# Patient Record
Sex: FEMALE | Race: WHITE | Employment: FULL TIME | URBAN - METROPOLITAN AREA
[De-identification: names, ages, dates, MRNs, and addresses within clinical notes are randomized per-mention and may not be internally consistent; named-entity substitution may affect disease eponyms.]

---

## 2017-09-18 ENCOUNTER — OFFICE VISIT (OUTPATIENT)
Dept: OBGYN | Age: 47
End: 2017-09-18
Payer: COMMERCIAL

## 2017-09-18 VITALS
DIASTOLIC BLOOD PRESSURE: 76 MMHG | SYSTOLIC BLOOD PRESSURE: 126 MMHG | HEIGHT: 60 IN | WEIGHT: 251 LBS | BODY MASS INDEX: 49.28 KG/M2

## 2017-09-18 DIAGNOSIS — N63.15 BREAST LUMP ON RIGHT SIDE AT 3 O'CLOCK POSITION: Primary | ICD-10-CM

## 2017-09-18 PROCEDURE — 99213 OFFICE O/P EST LOW 20 MIN: CPT | Performed by: OBSTETRICS & GYNECOLOGY

## 2017-11-28 ENCOUNTER — HOSPITAL ENCOUNTER (OUTPATIENT)
Dept: ULTRASOUND IMAGING | Age: 47
Discharge: HOME | End: 2017-11-28
Payer: COMMERCIAL

## 2017-11-28 ENCOUNTER — HOSPITAL ENCOUNTER (OUTPATIENT)
Dept: WOMENS IMAGING | Age: 47
Discharge: HOME | End: 2017-11-28
Payer: COMMERCIAL

## 2017-11-28 DIAGNOSIS — N63.15 BREAST LUMP ON RIGHT SIDE AT 3 O'CLOCK POSITION: ICD-10-CM

## 2017-11-28 PROCEDURE — 76641 ULTRASOUND BREAST COMPLETE: CPT

## 2017-11-28 PROCEDURE — G0204 DX MAMMO INCL CAD BI: HCPCS

## 2019-08-06 ENCOUNTER — HOSPITAL ENCOUNTER (OUTPATIENT)
Age: 49
Discharge: HOME | End: 2019-08-06
Payer: COMMERCIAL

## 2019-08-06 DIAGNOSIS — Z00.00 WELLNESS EXAMINATION: ICD-10-CM

## 2019-08-06 LAB
ALBUMIN SERPL-MCNC: 4.4 G/DL (ref 3.5–5.2)
ALBUMIN/GLOBULIN RATIO: 1.3 (ref 1–2.5)
ALP BLD-CCNC: 93 U/L (ref 35–104)
ALT SERPL-CCNC: 22 U/L (ref 5–33)
ANION GAP SERPL CALCULATED.3IONS-SCNC: 13 MMOL/L (ref 9–17)
AST SERPL-CCNC: 19 U/L
BILIRUB SERPL-MCNC: 0.47 MG/DL (ref 0.3–1.2)
BUN BLDV-MCNC: 10 MG/DL (ref 6–20)
BUN/CREAT BLD: 18 (ref 9–20)
CALCIUM SERPL-MCNC: 10.2 MG/DL (ref 8.6–10.4)
CHLORIDE BLD-SCNC: 103 MMOL/L (ref 98–107)
CHOLESTEROL, FASTING: 172 MG/DL
CHOLESTEROL/HDL RATIO: 3
CO2: 24 MMOL/L (ref 20–31)
CREAT SERPL-MCNC: 0.55 MG/DL (ref 0.5–0.9)
GFR AFRICAN AMERICAN: >60 ML/MIN
GFR NON-AFRICAN AMERICAN: >60 ML/MIN
GFR SERPL CREATININE-BSD FRML MDRD: ABNORMAL ML/MIN/{1.73_M2}
GFR SERPL CREATININE-BSD FRML MDRD: ABNORMAL ML/MIN/{1.73_M2}
GLUCOSE FASTING: 107 MG/DL (ref 70–99)
HDLC SERPL-MCNC: 57 MG/DL
LDL CHOLESTEROL: 87 MG/DL (ref 0–130)
POTASSIUM SERPL-SCNC: 4.5 MMOL/L (ref 3.7–5.3)
SODIUM BLD-SCNC: 140 MMOL/L (ref 135–144)
TOTAL PROTEIN: 7.9 G/DL (ref 6.4–8.3)
TRIGLYCERIDE, FASTING: 142 MG/DL
VLDLC SERPL CALC-MCNC: NORMAL MG/DL (ref 1–30)

## 2019-08-06 PROCEDURE — 80053 COMPREHEN METABOLIC PANEL: CPT

## 2019-08-06 PROCEDURE — 36415 COLL VENOUS BLD VENIPUNCTURE: CPT

## 2019-08-06 PROCEDURE — 80061 LIPID PANEL: CPT

## 2020-12-03 ENCOUNTER — HOSPITAL ENCOUNTER (EMERGENCY)
Age: 50
Discharge: HOME | End: 2020-12-03
Attending: EMERGENCY MEDICINE
Payer: COMMERCIAL

## 2020-12-03 VITALS
SYSTOLIC BLOOD PRESSURE: 215 MMHG | TEMPERATURE: 96.9 F | OXYGEN SATURATION: 100 % | DIASTOLIC BLOOD PRESSURE: 116 MMHG | HEART RATE: 72 BPM | RESPIRATION RATE: 16 BRPM

## 2020-12-03 PROCEDURE — 99283 EMERGENCY DEPT VISIT LOW MDM: CPT

## 2020-12-03 NOTE — ED PROVIDER NOTES
[facility] ED  EMERGENCY DEPARTMENT ENCOUNTER      Pt Name: Jaz Paredes  MRN: [id number]  [place] 1970  Date of evaluation: 12/3/2020  Provider: Jaya Fleming MD    CHIEF COMPLAINT       Chief Complaint   Patient presents with    Fall     Onset yesterday AM in parking lot, hitting back of head on a vehicle. Pt reports feeling dazed and having nosebleed after fall         HISTORY OF PRESENT ILLNESS   (Location/Symptom, Timing/Onset, Context/Setting, Quality, Duration, Modifying Factors, Severity)  Note limiting factors. Jaz Paredes is a 53 y.o. female who presents to the emergency department      67-year-old female presented emergency department for evaluation of an injury that occurred yesterday 12/2/2020. States she was in the parking lot at work she slipped on some ice and fell backwards striking her head on a car. She did have brief struck the visions but denies any loss of consciousness. She is able to stand and walk after the incident. She was concerned because she did notice amount of blood right nostril. Did not strike her face. She has not noticed any bleeding from her right nostril since. She does not have any pain in her upper neck, back of her head or anywhere else. She has not had any fluid leaking from her nose. Denies visual disturbances. No nausea or vomiting. She did take some Motrin yesterday for mild discomfort in the back of her head but she has not needed anything today and is currently denying any acute concerns. Nursing Notes were reviewed. REVIEW OF SYSTEMS    (2-9 systems for level 4, 10 or more for level 5)     Review of Systems   All other systems reviewed and are negative. Except as noted above the remainder of the review of systems was reviewed and negative.        PAST MEDICAL HISTORY     Past Medical History:   Diagnosis Date    Hypertension          SURGICAL HISTORY       Past Surgical History:   Procedure Laterality Date  CHOLECYSTECTOMY      CYSTOSCOPY      DILATION AND CURETTAGE OF UTERUS      Hysteroscopy and Endometrial Ablation    ENDOMETRIAL ABLATION      HERNIA REPAIR      umbilical 9400         CURRENT MEDICATIONS       Current Discharge Medication List      CONTINUE these medications which have NOT CHANGED    Details   simvastatin (ZOCOR) 40 MG tablet Take 1 tablet by mouth every evening  Qty: 90 tablet, Refills: 3      verapamil (CALAN SR) 180 MG extended release tablet Take 1 tablet by mouth daily  Qty: 90 tablet, Refills: 3      enalapril (VASOTEC) 10 MG tablet Take 1 tablet by mouth 2 times daily  Qty: 180 tablet, Refills: 3             ALLERGIES     Patient has no known allergies.     FAMILY HISTORY       Family History   Problem Relation Age of Onset    COPD Mother     Heart Disease Father     Pacemaker Sister     High Blood Pressure Sister     Heart Disease Brother           SOCIAL HISTORY       Social History     Socioeconomic History    Marital status:      Spouse name: None    Number of children: None    Years of education: None    Highest education level: None   Occupational History    None   Social Needs    Financial resource strain: Not hard at all   [place] insecurity     Worry: Never true     Inability: Never true    Transportation needs     Medical: No     Non-medical: No   Tobacco Use    Smoking status: Never Smoker    Smokeless tobacco: Never Used   Substance and Sexual Activity    Alcohol use: No     Comment: rare    Drug use: No    Sexual activity: Yes     Partners: Male   Lifestyle    Physical activity     Days per week: None     Minutes per session: None    Stress: None   Relationships    Social connections     Talks on phone: None     Gets together: None     Attends Worship service: None     Active member of club or organization: None     Attends meetings of clubs or organizations: None     Relationship status: None    Intimate partner violence     Fear of current or ex partner: None     Emotionally abused: None     Physically abused: None     Forced sexual activity: None   Other Topics Concern    None   Social History Narrative    None       SCREENINGS        Sari Coma Scale  Eye Opening: Spontaneous  Best Verbal Response: Oriented  Best Motor Response: Obeys commands  Sari Coma Scale Score: 15               PHYSICAL EXAM    (up to 7 for level 4, 8 or more for level 5)     ED Triage Vitals   BP Temp Temp Source Pulse Resp SpO2 Height Weight   12/03/20 0810 12/03/20 0806 12/03/20 0806 12/03/20 0806 12/03/20 0806 12/03/20 0806 -- --   (!) 215/116 96.9 °F (36.1 °C) Temporal 72 16 100 %         Physical Exam  Vitals signs and nursing note reviewed. Constitutional:       General: She is not in acute distress. Appearance: She is not toxic-appearing. HENT:      Head: Normocephalic and atraumatic. Comments: She has no tenderness to palpation of her scalp. There is no soft tissue swelling. No periorbital ecchymosis. No posterior auricular bruising. No tenderness. Right Ear: Tympanic membrane normal.      Left Ear: Tympanic membrane normal.      Nose:      Comments: Nose is nontender. There is no ecchymosis. No edema abrasions or deformity. Some friable mucosa on the right nostril. there is no active bleeding. No other acute concern     Mouth/Throat:      Mouth: Mucous membranes are moist.   Eyes:      Extraocular Movements: Extraocular movements intact. Pupils: Pupils are equal, round, and reactive to light. Neck:      Musculoskeletal: Normal range of motion and neck supple. Comments: No midline tenderness to palpation. Patient has full range of motion. Cardiovascular:      Rate and Rhythm: Normal rate and regular rhythm. Pulmonary:      Effort: Pulmonary effort is normal.      Breath sounds: Normal breath sounds. Abdominal:      General: There is no distension. Palpations: Abdomen is soft. Tenderness:  There is no abdominal tenderness. Musculoskeletal: Normal range of motion. Skin:     General: Skin is warm and dry. Neurological:      General: No focal deficit present. Mental Status: She is alert and oriented to person, place, and time. Psychiatric:         Mood and Affect: Mood normal.         DIAGNOSTIC RESULTS     EKG: All EKG's are interpreted by the Emergency Department Physician who either signs or Co-signs this chart in the absence of a cardiologist.        RADIOLOGY:   Non-plain film images such as CT, Ultrasound and MRI are read by the radiologist. Plain radiographic images are visualized and preliminarily interpreted by the emergency physician with the below findings:        Interpretation per the Radiologist below, if available at the time of this note:    No orders to display         ED BEDSIDE ULTRASOUND:   Performed by ED Physician - none    LABS:  Labs Reviewed - No data to display    All other labs were within normal range or not returned as of this dictation. EMERGENCY DEPARTMENT COURSE and DIFFERENTIAL DIAGNOSIS/MDM:   Vitals:    Vitals:    12/03/20 0806 12/03/20 0810   BP:  (!) 215/116   Pulse: 72    Resp: 16    Temp: 96.9 °F (36.1 °C)    TempSrc: Temporal    SpO2: 100%            MDM  Number of Diagnoses or Management Options  Minor closed head injury:   Diagnosis management comments: Sickle examination unremarkable. Patient is currently without any symptoms. She did not lose consciousness with the incident. She had no periorbital or posterior auricular bruising. No drainage from her nose. No neck pain. Assurance was given. Patient was discharged home. REASSESSMENT        CRITICAL CARE TIME   Total Critical Care time was  minutes, excluding separately reportable procedures. There was a high probability of clinically significant/life threatening deterioration in the patient's condition which required my urgent intervention.       CONSULTS:  None    PROCEDURES:  Unless otherwise noted below, none     Procedures        FINAL IMPRESSION      1. Minor closed head injury          DISPOSITION/PLAN   DISPOSITION Decision To Discharge 12/03/2020 08:37:53 AM      PATIENT REFERRED TO:  Yovani Hodgson MD  [place] [address], [id number] [place] [address]  [phone]      As needed      DISCHARGE MEDICATIONS:  Current Discharge Medication List        Controlled Substances Monitoring:     No flowsheet data found.     (Please note that portions of this note were completed with a voice recognition program.  Efforts were made to edit the dictations but occasionally words are mis-transcribed.)    Carley Garcia MD (electronically signed)  Attending Emergency Physician             Carley Garcia MD  12/03/20 [id number]

## 2021-07-16 ENCOUNTER — HOSPITAL ENCOUNTER (OUTPATIENT)
Age: 51
Discharge: HOME | End: 2021-07-16
Payer: COMMERCIAL

## 2021-07-16 DIAGNOSIS — Z00.00 WELLNESS EXAMINATION: ICD-10-CM

## 2021-07-16 LAB
ALBUMIN SERPL-MCNC: 4.5 G/DL (ref 3.5–5.2)
ALBUMIN/GLOBULIN RATIO: 1.5 (ref 1–2.5)
ALP BLD-CCNC: 122 U/L (ref 35–104)
ALT SERPL-CCNC: 27 U/L (ref 5–33)
ANION GAP SERPL CALCULATED.3IONS-SCNC: 12 MMOL/L (ref 9–17)
AST SERPL-CCNC: 22 U/L
BILIRUB SERPL-MCNC: 0.67 MG/DL (ref 0.3–1.2)
BUN BLDV-MCNC: 14 MG/DL (ref 6–20)
BUN/CREAT BLD: 26 (ref 9–20)
CALCIUM SERPL-MCNC: 10 MG/DL (ref 8.6–10.4)
CHLORIDE BLD-SCNC: 103 MMOL/L (ref 98–107)
CHOLESTEROL, FASTING: 174 MG/DL
CHOLESTEROL/HDL RATIO: 3
CO2: 24 MMOL/L (ref 20–31)
CREAT SERPL-MCNC: 0.54 MG/DL (ref 0.5–0.9)
GFR AFRICAN AMERICAN: >60 ML/MIN
GFR NON-AFRICAN AMERICAN: >60 ML/MIN
GFR SERPL CREATININE-BSD FRML MDRD: ABNORMAL ML/MIN/{1.73_M2}
GFR SERPL CREATININE-BSD FRML MDRD: ABNORMAL ML/MIN/{1.73_M2}
GLUCOSE FASTING: 105 MG/DL (ref 70–99)
HDLC SERPL-MCNC: 58 MG/DL
LDL CHOLESTEROL: 87 MG/DL (ref 0–130)
POTASSIUM SERPL-SCNC: 4.4 MMOL/L (ref 3.7–5.3)
SODIUM BLD-SCNC: 139 MMOL/L (ref 135–144)
TOTAL PROTEIN: 7.6 G/DL (ref 6.4–8.3)
TRIGLYCERIDE, FASTING: 144 MG/DL
VLDLC SERPL CALC-MCNC: NORMAL MG/DL (ref 1–30)

## 2021-07-16 PROCEDURE — 80061 LIPID PANEL: CPT

## 2021-07-16 PROCEDURE — 36415 COLL VENOUS BLD VENIPUNCTURE: CPT

## 2021-07-16 PROCEDURE — 80053 COMPREHEN METABOLIC PANEL: CPT

## 2021-10-01 ENCOUNTER — OFFICE VISIT (OUTPATIENT)
Dept: OBGYN | Age: 51
End: 2021-10-01
Payer: COMMERCIAL

## 2021-10-01 ENCOUNTER — HOSPITAL ENCOUNTER (OUTPATIENT)
Age: 51
Setting detail: SPECIMEN
Discharge: HOME | End: 2021-10-01
Payer: COMMERCIAL

## 2021-10-01 VITALS
WEIGHT: 249 LBS | SYSTOLIC BLOOD PRESSURE: 124 MMHG | DIASTOLIC BLOOD PRESSURE: 76 MMHG | HEIGHT: 60 IN | BODY MASS INDEX: 48.88 KG/M2

## 2021-10-01 DIAGNOSIS — Z01.419 WOMEN'S ANNUAL ROUTINE GYNECOLOGICAL EXAMINATION: ICD-10-CM

## 2021-10-01 DIAGNOSIS — Z12.31 ENCOUNTER FOR SCREENING MAMMOGRAM FOR BREAST CANCER: ICD-10-CM

## 2021-10-01 DIAGNOSIS — Z01.419 WOMEN'S ANNUAL ROUTINE GYNECOLOGICAL EXAMINATION: Primary | ICD-10-CM

## 2021-10-01 PROCEDURE — 99386 PREV VISIT NEW AGE 40-64: CPT | Performed by: OBSTETRICS & GYNECOLOGY

## 2021-10-01 PROCEDURE — G0145 SCR C/V CYTO,THINLAYER,RESCR: HCPCS

## 2021-10-01 NOTE — PROGRESS NOTES
YEARLY PHYSICAL    Date of service: 10/1/2021    Roseline Ricardo  Is a 49 y.o.   female    PT's PCP is: Chalo Martin MD     : 1970                                             Subjective:       No LMP recorded. Patient has had an ablation. Are your menses regular: not applicable    OB History    Para Term  AB Living   1 1 1         SAB TAB Ectopic Molar Multiple Live Births                    # Outcome Date GA Lbr Bucky/2nd Weight Sex Delivery Anes PTL Lv   1 Term  40w0d  5 lb 5 oz (2.41 kg) M Vag-Spont           Social History     Tobacco Use   Smoking Status Never Smoker   Smokeless Tobacco Never Used        Social History     Substance and Sexual Activity   Alcohol Use No    Comment: rare       Family History   Problem Relation Age of Onset    COPD Mother     Heart Disease Father     Pacemaker Sister     High Blood Pressure Sister     Heart Disease Brother        Allergies: Patient has no known allergies.       Current Outpatient Medications:     enalapril (VASOTEC) 10 MG tablet, Take 1 tablet by mouth daily, Disp: 90 tablet, Rfl: 3    hydroCHLOROthiazide (HYDRODIURIL) 25 MG tablet, Take 1 tablet by mouth every morning, Disp: 90 tablet, Rfl: 3    simvastatin (ZOCOR) 40 MG tablet, Take 1 tablet by mouth every evening, Disp: 90 tablet, Rfl: 3    verapamil (VERELAN) 360 MG extended release capsule, Take 1 capsule by mouth daily, Disp: 90 capsule, Rfl: 3    Social History     Substance and Sexual Activity   Sexual Activity Yes    Partners: Male       Any bleeding or pain with intercourse: Yes    Last Yearly:  7/6/15    Last pap: 7/6/15    Last HPV: never    Last Mammogram: 18    Last Dexascan never    Do you do self breast exams: No    Past Medical History:   Diagnosis Date    Hypertension        Past Surgical History:   Procedure Laterality Date    CHOLECYSTECTOMY      CYSTOSCOPY      DILATION AND CURETTAGE OF UTERUS      Hysteroscopy and Endometrial Ablation    ENDOMETRIAL ABLATION      UMBILICAL HERNIA REPAIR  2003       Family History   Problem Relation Age of Onset   Son COPD Mother     Heart Disease Father     Pacemaker Sister     High Blood Pressure Sister     Heart Disease Brother        Any family history of breast or ovarian cancer: No    Any family history of blood clots: Yes-mother      Chief Complaint   Patient presents with    Gynecologic Exam     Pt presents today for yearly exam. Pt states she thinks shes going through menopause- having a lot of hotflashes. Nurse: Ashley Mena    PE:  Vital Signs  Blood pressure 124/76, height 5' (1.524 m), weight 249 lb (112.9 kg). Labs:    No results found for this visit on 10/01/21. HPI: The patient is here today for a yearly exam.  We did discuss her concerns regarding menopause    NoPT denies fever, chills, nausea and vomiting       Objective  Lymphatic:   no lymphadenopathy  Heent:   negative   Cor: regular rate and rhythm, no murmurs              Pul:clear to auscultation bilaterally- no wheezes, rales or rhonchi, normal air movement, no respiratory distress      GI: Abdomen soft, non-tender.  BS normal. No masses,  No organomegaly, [facility] noted           Extremities: normal strength, tone, and muscle mass   Breasts: Breast:normal appearance, no masses or tenderness, Inspection negative, No nipple retraction or dimpling, No nipple discharge or bleeding, No axillary or supraclavicular adenopathy, Normal to palpation without dominant masses   Pelvic Exam: GENITAL/URINARY:  External Genitalia:  General appearance; normal, Hair distribution; normal, Lesions absent  Vagina:  General appearance normal, Estrogen effect normal, Discharge absent, Lesions absent, Pelvic support normal  Cervix:  General appearance normal, Lesions absent, Discharge absent, Tenderness absent, Enlargement absent, Nodularity absent  Uterus:  Size normal, Contour normal, Position normal, Masses absent, Consistency; normal, Support normal, Tenderness absent  Adenexa: Masses absent, Tenderness absent, Enlargement absent, Nodularity absent                                      Vaginal discharge: no vaginal discharge      Uterus: normal size, anteverted, mobile, non-tender, normal shape and consistency     Ovaries: Nonpalpable           Over 50% of time spent on counseling and care coordination on: see assessment and plan                        Assessment and Plan: After discussion on menopause the patient will start with herbal supplementation such as black cohosh. Diagnosis Orders   1. Women's annual routine gynecological examination  PAP SMEAR   2. Encounter for screening mammogram for breast cancer  TOM LASHELL DIGITAL SCREEN BILATERAL       I am having Jonathon Hammonds maintain her enalapril, hydroCHLOROthiazide, simvastatin, and verapamil.

## 2021-10-07 LAB — CYTOLOGY REPORT: NORMAL

## 2021-10-11 PROBLEM — K64.4 EXTERNAL HEMORRHOID: Status: ACTIVE | Noted: 2021-10-11

## 2021-10-15 ENCOUNTER — TELEPHONE (OUTPATIENT)
Dept: SURGERY | Age: 51
End: 2021-10-15

## 2021-10-15 NOTE — TELEPHONE ENCOUNTER
Can Christopher Blackwood be a direct set up for her colonoscopy? She also has some hemorrhoids. She wasn't sure if she could just do her colonoscopy or she needed to be seen in the office first. Please advise.

## 2021-10-26 ENCOUNTER — OFFICE VISIT (OUTPATIENT)
Dept: SURGERY | Age: 51
End: 2021-10-26
Payer: COMMERCIAL

## 2021-10-26 DIAGNOSIS — Z87.19 S/P VENTRAL HERNIORRHAPHY: ICD-10-CM

## 2021-10-26 DIAGNOSIS — K59.00 CONSTIPATION, UNSPECIFIED CONSTIPATION TYPE: ICD-10-CM

## 2021-10-26 DIAGNOSIS — K64.4 INTERNAL AND EXTERNAL HEMORRHOIDS WITHOUT COMPLICATION: ICD-10-CM

## 2021-10-26 DIAGNOSIS — K64.8 INTERNAL AND EXTERNAL HEMORRHOIDS WITHOUT COMPLICATION: ICD-10-CM

## 2021-10-26 DIAGNOSIS — Z90.49 S/P LAPAROSCOPIC CHOLECYSTECTOMY: ICD-10-CM

## 2021-10-26 DIAGNOSIS — Z98.890 S/P VENTRAL HERNIORRHAPHY: ICD-10-CM

## 2021-10-26 DIAGNOSIS — K62.89 ANAL PAIN: Primary | ICD-10-CM

## 2021-10-26 PROCEDURE — 99203 OFFICE O/P NEW LOW 30 MIN: CPT | Performed by: SURGERY

## 2021-10-26 RX ORDER — LIDOCAINE HYDROCHLORIDE AND HYDROCORTISONE ACETATE 30; 5 MG/G; MG/G
1 CREAM RECTAL 2 TIMES DAILY
Qty: 28.3 G | Refills: 1 | Status: SHIPPED | OUTPATIENT
Start: 2021-10-26 | End: 2022-02-12

## 2021-10-26 RX ORDER — SODIUM, POTASSIUM,MAG SULFATES 17.5-3.13G
1 SOLUTION, RECONSTITUTED, ORAL ORAL ONCE
Qty: 1 EACH | Refills: 0 | Status: SHIPPED | OUTPATIENT
Start: 2021-10-26 | End: 2021-10-26

## 2021-10-26 NOTE — PROGRESS NOTES
Ed Murrieta MD  General Surgery, Endoscopy  Chief Medical Officer    [facility]  [address] [address]  Dept: [phone]  Fax: [phone]    CHIEF COMPLAINT  Chief Complaint   Patient presents with    New Patient     hemorrhoids, colonoscopy consult       HPI    Ms Genoveva West is a pleasant 69-year-old female kindly referred to me by Sheri Dietz CNP for evaluation of rectal bleeding, hemorrhoids. She occasionally notes blood on toilet tissue. No previous hemorrhoidal surgery. Bowel movements are typically formed, brown, occasional constipation. No previous endoscopy. History of cholecystectomy and umbilical herniorrhaphy. No recent weight changes, 250 pounds, BMI 48. No cough, fever nor other respiratory symptoms. No family history of colon cancer nor polyps to her knowledge. Patient has never smoked. Review of Systems   Constitutional: Negative for activity change, appetite change, chills, fever and unexpected weight change. HENT: Negative for nosebleeds, sneezing, sore throat and trouble swallowing. Eyes: Negative for visual disturbance. Respiratory: Negative for cough, choking and shortness of breath. Cardiovascular: Negative for chest pain, palpitations and leg swelling. Gastrointestinal: Positive for anal bleeding and blood in stool. Negative for abdominal pain, nausea and vomiting. Genitourinary: Negative for dysuria, flank pain and hematuria. Musculoskeletal: Negative for back pain, gait problem and myalgias. Allergic/Immunologic: Negative for immunocompromised state. Neurological: Negative for dizziness, seizures, syncope, weakness and headaches. Hematological: Does not bruise/bleed easily. Psychiatric/Behavioral: Negative for confusion and sleep disturbance.        Past Medical History:   Diagnosis Date    Hypertension        Past Surgical History:   Procedure Laterality Date    CHOLECYSTECTOMY      CYSTOSCOPY      DILATION AND CURETTAGE OF UTERUS      Hysteroscopy and Endometrial Ablation    ENDOMETRIAL ABLATION      UMBILICAL HERNIA REPAIR  2003       Family History   Problem Relation Age of Onset    COPD Mother     Heart Disease Father     Pacemaker Sister     High Blood Pressure Sister     Heart Disease Brother        Allergies:  See list    Current Outpatient Medications   Medication Sig Dispense Refill    hydrocortisone (ANUSOL-HC) 25 MG suppository Place 1 suppository rectally every 12 hours 10 suppository 0    enalapril (VASOTEC) 10 MG tablet Take 1 tablet by mouth daily 90 tablet 3    hydroCHLOROthiazide (HYDRODIURIL) 25 MG tablet Take 1 tablet by mouth every morning 90 tablet 3    simvastatin (ZOCOR) 40 MG tablet Take 1 tablet by mouth every evening 90 tablet 3    verapamil (VERELAN) 360 MG extended release capsule Take 1 capsule by mouth daily 90 capsule 3     No current facility-administered medications for this visit. Social History     Socioeconomic History    Marital status:      Spouse name: Not on file    Number of children: Not on file    Years of education: Not on file    Highest education level: Not on file   Occupational History    Not on file   Tobacco Use    Smoking status: Never Smoker    Smokeless tobacco: Never Used   Vaping Use    Vaping Use: Never used   Substance and Sexual Activity    Alcohol use: No     Comment: rare    Drug use: No    Sexual activity: Yes     Partners: Male   Other Topics Concern    Not on file   Social History Narrative    Not on file     Social Determinants of Health     Financial Resource Strain: Low Risk     Difficulty of Paying Living Expenses: Not hard at all   Food Insecurity: No Food Insecurity    Worried About [address] [employer] in the Last Year: Never true    [place] of Food in the Last Year: Never true   Transportation Needs: No Transportation Needs    Lack of Transportation (Medical):  No    Lack of Transportation (Non-Medical): No   Physical Activity:     Days of Exercise per Week:     Minutes of Exercise per Session:    Stress:     Feeling of Stress :    Social Connections:     Frequency of Communication with Friends and Family:     Frequency of Social Gatherings with Friends and Family:     Attends Restorationist Services:     Active Member of Clubs or Organizations:     Attends Club or Organization Meetings:     Marital Status:    Intimate Partner Violence:     Fear of Current or Ex-Partner:     Emotionally Abused:     Physically Abused:     Sexually Abused: There were no vitals taken for this visit. Physical Exam  Vitals and nursing note reviewed. Constitutional:       Appearance: She is well-developed. HENT:      Head: Normocephalic and atraumatic. Eyes:      General: No scleral icterus. Conjunctiva/sclera: Conjunctivae normal.      Pupils: Pupils are equal, round, and reactive to light. Neck:      Vascular: No JVD. Trachea: No tracheal deviation. Cardiovascular:      Rate and Rhythm: Normal rate and regular rhythm. Pulmonary:      Effort: Pulmonary effort is normal. No respiratory distress. Chest:      Chest wall: No tenderness. Abdominal:      General: There is no distension. Palpations: Abdomen is soft. There is no mass. Tenderness: There is no abdominal tenderness. There is no guarding or rebound. Musculoskeletal:         General: Normal range of motion. Cervical back: Normal range of motion and neck supple. Lymphadenopathy:      Cervical: No cervical adenopathy. Skin:     General: Skin is warm and dry. Findings: No erythema or rash. Neurological:      Mental Status: She is alert and oriented to person, place, and time. Cranial Nerves: No cranial nerve deficit. Psychiatric:         Behavior: Behavior normal.         Thought Content:  Thought content normal.         Judgment: Judgment normal.         IMAGING/LABS    CBC Auto Differential  Order: [id number]   Status: Final result     Visible to patient: Yes (seen)     Next appt: None     Dx: Pre-op testing     0 Result Notes    Component Ref Range & Units 11/10/21 1634 8/27/13 0835   WBC 3.5 - 11.3 k/uL 9.3  10.8 [facility]    RBC 3.95 - 5.11 m/uL 4.14  4.24 [facility]    Hemoglobin 11.9 - 15.1 g/dL 11.5 Low   11.6 Low  [facility]    Hematocrit 36.3 - 47.1 % 37.0  34.9 Low  [facility]    MCV 82.6 - 102.9 fL 89.4  82.3 [facility]    MCH 25.2 - 33.5 pg 27.8  27.5 [facility]    MCHC 28.4 - 34.8 g/dL 31.1  33.4 [facility]    RDW 11.8 - 14.4 % 12.8  13.8 [facility]    Platelets 128 - 846 k/uL 324  352 [facility]    MPV 8.1 - 13.5 fL 10.2  NOT REPORTED R[1]    NRBC Automated 0.0 per 100 WBC 0.0     Differential Type  NOT REPORTED  NOT REPORTED [1]    Seg Neutrophils 36 - 65 % 61  58 [facility]    Lymphocytes 24 - 43 % 32  33 [facility]    Monocytes 3 - 12 % 6  6 [facility]    Eosinophils % 1 - 4 % 1  3 [facility]    Basophils 0 - 2 % 0  0 [facility]    Immature Granulocytes 0 % 0     Segs Absolute 1.50 - 8.10 k/uL 5.61  6.30 [facility]    Absolute Lymph # 1.10 - 3.70 k/uL 2.96  3.50 [facility]    Absolute Mono # 0.10 - 1.20 k/uL 0.56  0.60 [facility]    Absolute Eos # 0.00 - 0.44 k/uL 0.13  0.30 [facility]    Basophils Absolute 0.00 - 0.20 k/uL 0.03  0.00 R, [facility]    Absolute Immature Granulocyte 0.00 - 0.30 k/uL 0.04     WBC Morphology  NOT REPORTED  NOT REPORTED [1]    RBC Morphology  NOT REPORTED  NOT REPORTED [1]    Platelet Estimate  NOT REPORTED  NOT REPORTED [1]    Resulting [address]   [address] Lab              Specimen Collected: 11/10/21 16:34 Last Resulted: 11/10/21 22:30        Lab Flowsheet     Order Details     View Encounter     Lab and Collection Details     Routing     Result History        CM=Additional comments  R=Reference range differs from displayed range  [1]=[facility]                 ASSESSMENT     Diagnosis Orders   1.  Anal pain  Na Sulfate-K Sulfate-Mg Sulf (SUPREP BOWEL PREP KIT) 17.5-3.13-1.6 GM/177ML SOLN   2. Internal and external hemorrhoids without complication  lidocaine-Hydrocort 3-0.5 % cream   3. BMI 45.0-49.9, adult ([facility] [address]     4. Constipation, unspecified constipation type     5. S/P laparoscopic cholecystectomy     6. S/P ventral herniorrhaphy         PLAN    Discussed at length with Ms. Gill her history of internal and external hemorrhoids, constipation, etc.  Prescription for Perla Bansal provided. We will proceed with colonoscopy. Risks, benefits, alternatives thoroughly reviewed and accepted by Ms. Gill, including GI bleeding, perforation, missed lesions, COVID-19 exposure/infection, etc.  Discussed importance of a healthy, balanced high-fiber low-fat diet with fiber supplementation, increased water intake and physical activity, decrease calories and sugar, etc.     Livier Daniels MD

## 2021-11-10 ENCOUNTER — HOSPITAL ENCOUNTER (OUTPATIENT)
Age: 51
Discharge: HOME | End: 2021-11-10
Payer: COMMERCIAL

## 2021-11-10 DIAGNOSIS — Z01.818 PRE-OP TESTING: ICD-10-CM

## 2021-11-10 LAB
ABSOLUTE EOS #: 0.13 K/UL (ref 0–0.44)
ABSOLUTE IMMATURE GRANULOCYTE: 0.04 K/UL (ref 0–0.3)
ABSOLUTE LYMPH #: 2.96 K/UL (ref 1.1–3.7)
ABSOLUTE MONO #: 0.56 K/UL (ref 0.1–1.2)
ANION GAP SERPL CALCULATED.3IONS-SCNC: 13 MMOL/L (ref 9–17)
BASOPHILS # BLD: 0 % (ref 0–2)
BASOPHILS ABSOLUTE: 0.03 K/UL (ref 0–0.2)
BUN BLDV-MCNC: 15 MG/DL (ref 6–20)
BUN/CREAT BLD: ABNORMAL (ref 9–20)
CALCIUM SERPL-MCNC: 9.6 MG/DL (ref 8.6–10.4)
CHLORIDE BLD-SCNC: 102 MMOL/L (ref 98–107)
CO2: 23 MMOL/L (ref 20–31)
CREAT SERPL-MCNC: 0.64 MG/DL (ref 0.5–0.9)
DIFFERENTIAL TYPE: ABNORMAL
EOSINOPHILS RELATIVE PERCENT: 1 % (ref 1–4)
GFR AFRICAN AMERICAN: >60 ML/MIN
GFR NON-AFRICAN AMERICAN: >60 ML/MIN
GFR SERPL CREATININE-BSD FRML MDRD: ABNORMAL ML/MIN/{1.73_M2}
GFR SERPL CREATININE-BSD FRML MDRD: ABNORMAL ML/MIN/{1.73_M2}
GLUCOSE BLD-MCNC: 100 MG/DL (ref 70–99)
HCT VFR BLD CALC: 37 % (ref 36.3–47.1)
HEMOGLOBIN: 11.5 G/DL (ref 11.9–15.1)
IMMATURE GRANULOCYTES: 0 %
LYMPHOCYTES # BLD: 32 % (ref 24–43)
MCH RBC QN AUTO: 27.8 PG (ref 25.2–33.5)
MCHC RBC AUTO-ENTMCNC: 31.1 G/DL (ref 28.4–34.8)
MCV RBC AUTO: 89.4 FL (ref 82.6–102.9)
MONOCYTES # BLD: 6 % (ref 3–12)
NRBC AUTOMATED: 0 PER 100 WBC
PDW BLD-RTO: 12.8 % (ref 11.8–14.4)
PLATELET # BLD: 324 K/UL (ref 138–453)
PLATELET ESTIMATE: ABNORMAL
PMV BLD AUTO: 10.2 FL (ref 8.1–13.5)
POTASSIUM SERPL-SCNC: 4.4 MMOL/L (ref 3.7–5.3)
RBC # BLD: 4.14 M/UL (ref 3.95–5.11)
RBC # BLD: ABNORMAL 10*6/UL
SEG NEUTROPHILS: 61 % (ref 36–65)
SEGMENTED NEUTROPHILS ABSOLUTE COUNT: 5.61 K/UL (ref 1.5–8.1)
SODIUM BLD-SCNC: 138 MMOL/L (ref 135–144)
WBC # BLD: 9.3 K/UL (ref 3.5–11.3)
WBC # BLD: ABNORMAL 10*3/UL

## 2021-11-10 PROCEDURE — 93005 ELECTROCARDIOGRAM TRACING: CPT | Performed by: SURGERY

## 2021-11-10 PROCEDURE — 85025 COMPLETE CBC W/AUTO DIFF WBC: CPT

## 2021-11-10 PROCEDURE — 36415 COLL VENOUS BLD VENIPUNCTURE: CPT

## 2021-11-10 PROCEDURE — 80048 BASIC METABOLIC PNL TOTAL CA: CPT

## 2021-11-10 NOTE — PROGRESS NOTES
Preoperative Instructions:    Stop eating solid foods as per bowel prep instructions for this surgery. Stop drinking clear liquids at midnight the night prior to your surgery. Arrive at the surgery center (3rd entrance) on ____11-15-21___________ by ____1130-1200___________. Please stop any blood thinning medications as directed by your surgeon or prescribing physician. Failure to stop certain medications may interfere with your scheduled surgery. These may include: Aspirin, Coumadin, Plavix, NSAIDS (Motrin, Aleve, Advil, Mobic, Celebrex), Eliquis, Pradaxa, Xarelto, Fish oil, and herbal supplements. You may continue the rest of your medications through the night before surgery unless instructed otherwise. Day of surgery please take only the following medication(s) with a small sip of water:Vasotec and Verapamil      Please shower with antibacterial soap and water. Reminders:  -If you are going home the day of your procedure, you will need a family member or friend to stay during the procedure and drive you home after your procedure. Your  must be 25years of age or older and able to sign off on your discharge instructions.    -If you are going home the same day of your surgery, someone must remain with you for the first 24 hours after your surgery if you receive sedation or anesthesia.      -Please do not wear any jewelery, lotions, contatcs or body piercing the day of surgery

## 2021-11-11 PROCEDURE — 93010 ELECTROCARDIOGRAM REPORT: CPT | Performed by: INTERNAL MEDICINE

## 2021-11-12 ENCOUNTER — ANESTHESIA EVENT (OUTPATIENT)
Dept: OPERATING ROOM | Age: 51
End: 2021-11-12
Payer: COMMERCIAL

## 2021-11-14 NOTE — BRIEF OP NOTE
Brief Postoperative Note      Patient: Chevy Samuels  YOB: 1970  MRN: [id number]   Yumiko Gutierrez MD      Date of Procedure: 11/15/2021    Pre-Op Diagnosis: screening colonoscopy. Symptomatic hemorrhoids.     Post-Op Diagnosis: Same       Procedure(s):  1) screening colonoscopy  2) hemorrhoidectomy x 2 columns      Surgeon(s):  Rosi Diaz DO    Anesthesia: General    Estimated Blood Loss (mL): less than 5 ml    Complications: None    Specimens:   ID Type Source Tests Collected by Time Destination   A : HEMORRHOIDS Tissue Tissue SURGICAL PATHOLOGY Emily Cain DO 11/15/2021 1435          Electronically signed by Emily Cain DO, FACOS, FACS on 11/15/2021 at 9:53 PM

## 2021-11-14 NOTE — H&P
GENERAL SURGERY CONSULTATION        Patient's Name/ Date of Birth/ Gender: Joyce Manley / 1970 (49 y.o.) / female      PCP: Sandy Floyd MD  Referring: self referral     History of present Illness:  Patient is a pleasant 49 y.o. female who self referred. She had originally seen Dr. Laura Cummings for rectal pain/hemorrhoids in October and had been examined by him and was already scheduled for a colonoscopy in late December. She was having increasing pain and wanted to see about being done sooner, she had called the [facility] and [place] offices due to her pain and ultimately she came to [facility] today. She is with her . She is seen and examined. Currently no rectal bleeding. Denies family history of colorectal cancer or inflammatory bowel disease. Her brother had hemorrhoid issues. She denies fevers or chills. She is a non-smoker. 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 She has issues with chronic constipation. She is on fiber supplements. She has tried prescription [facility], [facility]. suppositories/creams without success. BMI 47.8  She is quite anxious/concerned about getting scoped and having hemorrhoids addressed at the same time due to pain.      Past Medical History:  has a past medical history of Hypertension, Morbid obesity with BMI of 45.0-49.9, adult ([address] MVP (mitral valve prolapse), and PONV (postoperative nausea and vomiting).     Past Surgical History:   Past Surgical History         Past Surgical History:   Procedure Laterality Date    CHOLECYSTECTOMY        CYSTOSCOPY        DILATION AND CURETTAGE OF UTERUS         Hysteroscopy and Endometrial Ablation    ENDOMETRIAL ABLATION        HERNIA REPAIR        UMBILICAL HERNIA REPAIR   2003            Social History:  reports that she has never smoked.  She has never used smokeless tobacco. She reports that she does not drink alcohol and does not use drugs.     Family History: family history includes COPD in her mother; Heart Disease in her brother and father; High Blood Pressure in her sister; Pacemaker in her sister.     Review of Systems:   General: Completed and, except as mentioned above, was negative or noncontributory  Psychological:  Completed and, except as mentioned above, was negative or noncontributory  Ophthalmic:  Completed and, except as mentioned above, was negative or noncontributory  ENT:  Completed and, except as mentioned above, was negative or noncontributory  Allergy and Immunology:  Completed and, except as mentioned above, was negative or noncontributory  Hematological and Lymphatic:  Completed and, except as mentioned above, was negative or noncontributory  Endocrine: Completed and, except as mentioned above, was negative or noncontributory  Breast:  Completed and, except as mentioned above, was negative or noncontributory  Respiratory:  Completed and, except as mentioned above, was negative or noncontributory  Cardiovascular:  Completed and, except as mentioned above, was negative or noncontributory  Gastrointestinal: Completed and, except as mentioned above, was negative or noncontributory  Genito-Urinary:  Completed and, except as mentioned above, was negative or noncontributory  Musculoskeletal:  Completed and, except as mentioned above, was negative or noncontributory  Neurological:  Completed and, except as mentioned above, was negative or noncontributory  Dermatological:  Completed and, except as mentioned above, was negative or noncontributory     Allergies: Patient has no known allergies.     Current Meds:  Current Medication     Current Outpatient Medications:     Psyllium (METAMUCIL PO), Take by mouth three times daily, Disp: , Rfl:     lidocaine-Hydrocort 3-0.5 % cream, Place 1 Tube around the anus 2 times daily, Disp: 28.3 g, Rfl: 1    hydrocortisone (ANUSOL-HC) 25 MG suppository, Place 1 suppository rectally every 12 hours, Disp: 10 suppository, Rfl: 0    enalapril (VASOTEC) 10 MG tablet, Take 1 tablet by mouth daily, Disp: 90 tablet, Rfl: 3    hydroCHLOROthiazide (HYDRODIURIL) 25 MG tablet, Take 1 tablet by mouth every morning, Disp: 90 tablet, Rfl: 3    simvastatin (ZOCOR) 40 MG tablet, Take 1 tablet by mouth every evening, Disp: 90 tablet, Rfl: 3    verapamil (VERELAN) 360 MG extended release capsule, Take 1 capsule by mouth daily, Disp: 90 capsule, Rfl: 3        Physical Exam:  Vital signs and Nurse's note reviewed. Resp 18   Ht 5' 1\" (1.549 m)   Wt 253 lb (114.8 kg)   Breastfeeding No   BMI 47.80 kg/m²    height is 5' 1\" (1.549 m) and weight is 253 lb (114.8 kg). Her respiration is 18. Gen:  A&Ox3, NAD. Pleasant and cooperative. HEENT: PERRLA, EOMI, no scleral icterus  Neck:  no goiter  CVS: Regular rate and rhythm  Resp: Good bilateral air entry, no active wheezing, no labored breathing  Abd: soft, non-tender  Rectal exam: internal hemorrhoid column prolapsing with Valsalva, no thrombosis, no bleeding, no abscess  Ext: Moves all extremities, no gross focal motor deficits  Skin: No erythema or ulcerations      Labs:         Lab Results   Component Value Date     WBC 9.3 11/10/2021     HGB 11.5 11/10/2021     HCT 37.0 11/10/2021     MCV 89.4 11/10/2021      11/10/2021            Lab Results   Component Value Date      11/10/2021     K 4.4 11/10/2021      11/10/2021     CO2 23 11/10/2021     BUN 15 11/10/2021     CREATININE 0.64 11/10/2021     GLUCOSE 100 11/10/2021     CALCIUM 9.6 11/10/2021            Lab Results   Component Value Date     ALKPHOS 122 07/16/2021     ALT 27 07/16/2021     AST 22 07/16/2021     PROT 7.6 07/16/2021     BILITOT 0.67 07/16/2021     LABALBU 4.5 07/16/2021         Impressions/Recommendations:      1) screening colonoscopy planned  2) symptomatic hemorrhoids, plan formal hemorrhoidectomy. Plan to do prone positioning in [place].  Will discuss with anesthesiologist day of surgery, if BMI is an issue with positioning, will modify to lithotomy if needed. Risks and benefits discussed in great detail, all questions answered. We do have opening for next week and she wants to proceed earlier instead of waiting till end of December.       H&P  General Surgery        Pt Name: Wiliam Schofield  MRN: [id number]  YOB: 1970  Date of evaluation: 11/15/2021      [x] I have examined the patient and reviewed the H&P/Consult completed, and there are no changes to the patient or plans. [] I have examined the patient and reviewed the H&P/Consult and have noted the following changes: The patient was counseled at length about the risks of cecilio Covid-19 during their perioperative period and any recovery window from their procedure. The patient was made aware that cecilio Covid-19  may worsen their prognosis for recovering from their procedure  and lend to a higher morbidity and/or mortality risk. All material risks, benefits, and reasonable alternatives including postponing the procedure were discussed. The patient does wish to proceed with the procedure at this time.         Electronically signed by Derrick Pérez DO  on 11/15/2021 at 12:35 PM

## 2021-11-14 NOTE — OP NOTE
Operative Note      Patient: Anne Vail  YOB: 1970  MRN: [id number]   Elliott Freeman MD      Date of Procedure: 11/15/2021    Pre-Op Diagnosis: screening colonoscopy. Symptomatic hemorrhoids. Post-Op Diagnosis: Same       Procedure(s):  1) screening colonoscopy  2) internal hemorrhoidectomy x 2 columns    Surgeon(s):  Rosi Diaz DO    Anesthesia: General    Estimated Blood Loss (mL): less than 5 ml    Complications: None    Specimens:   ID Type Source Tests Collected by Time Destination   A : HEMORRHOIDS Tissue Tissue SURGICAL PATHOLOGY Archer Castleman, DO 11/15/2021 1435      Procedure(s) Details:    1) colonoscopy:    Please see H&P for indications for the above named procedure. Patient was brought to the endoscopy suite and placed under monitored anesthesia. Patient was positioned in left lateral position. Time out called and procedure confirmed. The lubricated variable stiffness pediatric colonoscope was carefully passed under direct vision into the rectum, advanced through the sigmoid colon, transverse colon, ascending colon and the cecum. The ileocecal valve was well visualized. Findings:     Cecum: normal cecal pouch. IC valve and appendiceal orifice well confirmed  Ascending: normal  Transverse: normal  Descending: normal, redundant  Sigmoid: normal, redundant  Rectum: normal  Rectal exam: hemorrhoids  Bowel prep quality: excellent    At the distal 1/3 of the rectum, the scope was retroflexed and was negative. The patient tolerated the procedure well. 2) hemorrhoidectomy:    Pre-op IV antibiotics administered. After colonoscopy patient is carefully positioned in prone laurent-knife position with protective padding for joints, etc. Perineum and anorectum are prepped with betadine solution. Buttocks taped apart to optimize exposure. Site is draped in sterile fashion. Exparel + saline mixture infiltrated subcutaneously circumferentially.  [address] retractor inserted and exam performed. Needle tip Bovie used to score around the prolapsed internal hemorrhoids in 2 columns. Care is taken to stay above the sphincter muscles. Mini Ligasure oriented in radial fashion and used to excise the hemorrhoidal tissue staying well above the sphincter muscles while using an Allis clamp to elevate the tissue off the muscle. Specimens were submitted. The Allis was at the apex and running locking 3-0 vicryl used to approximate the mucosal edges and maintain hemostasis. Care is taken to avoid excising the skin of the anus to prevent later stenosis. Specimens submitted. Site well irrigated, re-inspected. Hemostasis noted. Antibiotic ointment generously applied, rolled Gelfoam inserted. Tolerated well. All sponge, needle, and instruments counts correct. I spoke with family afterwards. Detailed postop instructions provided orally and typed out to take home.       Electronically signed by Emily Cain DO, FACOS, FACS on 11/15/2021 at 9:53 PM

## 2021-11-15 ENCOUNTER — ANESTHESIA (OUTPATIENT)
Dept: OPERATING ROOM | Age: 51
End: 2021-11-15
Payer: COMMERCIAL

## 2021-11-15 ENCOUNTER — HOSPITAL ENCOUNTER (OUTPATIENT)
Age: 51
Setting detail: OUTPATIENT SURGERY
Discharge: HOME | End: 2021-11-15
Attending: SURGERY | Admitting: SURGERY
Payer: COMMERCIAL

## 2021-11-15 VITALS
BODY MASS INDEX: 48.1 KG/M2 | HEIGHT: 60 IN | RESPIRATION RATE: 18 BRPM | SYSTOLIC BLOOD PRESSURE: 142 MMHG | DIASTOLIC BLOOD PRESSURE: 77 MMHG | TEMPERATURE: 97.4 F | WEIGHT: 245 LBS | HEART RATE: 64 BPM | OXYGEN SATURATION: 99 %

## 2021-11-15 VITALS — SYSTOLIC BLOOD PRESSURE: 118 MMHG | TEMPERATURE: 94.8 F | DIASTOLIC BLOOD PRESSURE: 71 MMHG | OXYGEN SATURATION: 100 %

## 2021-11-15 DIAGNOSIS — G89.18 ACUTE POSTOPERATIVE PAIN: ICD-10-CM

## 2021-11-15 DIAGNOSIS — Z01.818 PRE-OP TESTING: Primary | ICD-10-CM

## 2021-11-15 PROBLEM — Z12.11 SCREEN FOR COLON CANCER: Status: ACTIVE | Noted: 2021-11-15

## 2021-11-15 PROBLEM — E66.01 MORBID OBESITY WITH BMI OF 45.0-49.9, ADULT (HCC): Chronic | Status: ACTIVE | Noted: 2021-11-15

## 2021-11-15 PROBLEM — K64.2 GRADE III HEMORRHOIDS: Status: ACTIVE | Noted: 2021-11-15

## 2021-11-15 LAB — HCG, PREGNANCY URINE (POC): NEGATIVE

## 2021-11-15 PROCEDURE — 7100000010 HC PHASE II RECOVERY - FIRST 15 MIN: Performed by: SURGERY

## 2021-11-15 PROCEDURE — 7100000011 HC PHASE II RECOVERY - ADDTL 15 MIN: Performed by: SURGERY

## 2021-11-15 PROCEDURE — 2580000003 HC RX 258: Performed by: ANESTHESIOLOGY

## 2021-11-15 PROCEDURE — 3700000000 HC ANESTHESIA ATTENDED CARE: Performed by: SURGERY

## 2021-11-15 PROCEDURE — 2580000003 HC RX 258: Performed by: SURGERY

## 2021-11-15 PROCEDURE — 6370000000 HC RX 637 (ALT 250 FOR IP): Performed by: SURGERY

## 2021-11-15 PROCEDURE — 6360000002 HC RX W HCPCS: Performed by: SURGERY

## 2021-11-15 PROCEDURE — 6360000002 HC RX W HCPCS

## 2021-11-15 PROCEDURE — 2500000003 HC RX 250 WO HCPCS

## 2021-11-15 PROCEDURE — 81025 URINE PREGNANCY TEST: CPT

## 2021-11-15 PROCEDURE — 7100000000 HC PACU RECOVERY - FIRST 15 MIN: Performed by: SURGERY

## 2021-11-15 PROCEDURE — 2500000003 HC RX 250 WO HCPCS: Performed by: NURSE ANESTHETIST, CERTIFIED REGISTERED

## 2021-11-15 PROCEDURE — 3609027000 HC COLONOSCOPY: Performed by: SURGERY

## 2021-11-15 PROCEDURE — 6370000000 HC RX 637 (ALT 250 FOR IP)

## 2021-11-15 PROCEDURE — 88304 TISSUE EXAM BY PATHOLOGIST: CPT

## 2021-11-15 PROCEDURE — 3700000001 HC ADD 15 MINUTES (ANESTHESIA): Performed by: SURGERY

## 2021-11-15 PROCEDURE — 2709999900 HC NON-CHARGEABLE SUPPLY: Performed by: SURGERY

## 2021-11-15 PROCEDURE — 7100000001 HC PACU RECOVERY - ADDTL 15 MIN: Performed by: SURGERY

## 2021-11-15 PROCEDURE — C9290 INJ, BUPIVACAINE LIPOSOME: HCPCS | Performed by: SURGERY

## 2021-11-15 PROCEDURE — 6360000002 HC RX W HCPCS: Performed by: NURSE ANESTHETIST, CERTIFIED REGISTERED

## 2021-11-15 PROCEDURE — 2720000010 HC SURG SUPPLY STERILE: Performed by: SURGERY

## 2021-11-15 PROCEDURE — 3609021100 HC HEMORRHOIDECTOMY: Performed by: SURGERY

## 2021-11-15 RX ORDER — DEXAMETHASONE SODIUM PHOSPHATE 10 MG/ML
INJECTION INTRAMUSCULAR; INTRAVENOUS PRN
Status: DISCONTINUED | OUTPATIENT
Start: 2021-11-15 | End: 2021-11-15 | Stop reason: SDUPTHER

## 2021-11-15 RX ORDER — MIDAZOLAM HYDROCHLORIDE 1 MG/ML
INJECTION INTRAMUSCULAR; INTRAVENOUS PRN
Status: DISCONTINUED | OUTPATIENT
Start: 2021-11-15 | End: 2021-11-15 | Stop reason: SDUPTHER

## 2021-11-15 RX ORDER — CEFOXITIN 2 G/1
INJECTION, POWDER, FOR SOLUTION INTRAVENOUS
Status: DISCONTINUED
Start: 2021-11-15 | End: 2021-11-15 | Stop reason: WASHOUT

## 2021-11-15 RX ORDER — GLYCOPYRROLATE 1 MG/5 ML
SYRINGE (ML) INTRAVENOUS PRN
Status: DISCONTINUED | OUTPATIENT
Start: 2021-11-15 | End: 2021-11-15 | Stop reason: SDUPTHER

## 2021-11-15 RX ORDER — APREPITANT 40 MG/1
CAPSULE ORAL
Status: DISCONTINUED
Start: 2021-11-15 | End: 2021-11-15 | Stop reason: WASHOUT

## 2021-11-15 RX ORDER — HYDRALAZINE HYDROCHLORIDE 20 MG/ML
10 INJECTION INTRAMUSCULAR; INTRAVENOUS EVERY 10 MIN PRN
Status: DISCONTINUED | OUTPATIENT
Start: 2021-11-15 | End: 2021-11-15 | Stop reason: HOSPADM

## 2021-11-15 RX ORDER — SODIUM CHLORIDE 0.9 % (FLUSH) 0.9 %
10 SYRINGE (ML) INJECTION EVERY 12 HOURS SCHEDULED
Status: DISCONTINUED | OUTPATIENT
Start: 2021-11-15 | End: 2021-11-15 | Stop reason: HOSPADM

## 2021-11-15 RX ORDER — SODIUM CHLORIDE 9 MG/ML
INJECTION, SOLUTION INTRAVENOUS CONTINUOUS
Status: DISCONTINUED | OUTPATIENT
Start: 2021-11-15 | End: 2021-11-15 | Stop reason: HOSPADM

## 2021-11-15 RX ORDER — APREPITANT 40 MG/1
40 CAPSULE ORAL ONCE
Status: COMPLETED | OUTPATIENT
Start: 2021-11-15 | End: 2021-11-15

## 2021-11-15 RX ORDER — NEOSTIGMINE METHYLSULFATE 5 MG/5 ML
SYRINGE (ML) INTRAVENOUS PRN
Status: DISCONTINUED | OUTPATIENT
Start: 2021-11-15 | End: 2021-11-15 | Stop reason: SDUPTHER

## 2021-11-15 RX ORDER — SODIUM CHLORIDE, SODIUM LACTATE, POTASSIUM CHLORIDE, CALCIUM CHLORIDE 600; 310; 30; 20 MG/100ML; MG/100ML; MG/100ML; MG/100ML
INJECTION, SOLUTION INTRAVENOUS CONTINUOUS
Status: DISCONTINUED | OUTPATIENT
Start: 2021-11-15 | End: 2021-11-15 | Stop reason: HOSPADM

## 2021-11-15 RX ORDER — OXYCODONE HYDROCHLORIDE AND ACETAMINOPHEN 5; 325 MG/1; MG/1
1 TABLET ORAL PRN
Status: DISCONTINUED | OUTPATIENT
Start: 2021-11-15 | End: 2021-11-15 | Stop reason: HOSPADM

## 2021-11-15 RX ORDER — 0.9 % SODIUM CHLORIDE 0.9 %
500 INTRAVENOUS SOLUTION INTRAVENOUS
Status: DISCONTINUED | OUTPATIENT
Start: 2021-11-15 | End: 2021-11-15 | Stop reason: HOSPADM

## 2021-11-15 RX ORDER — DIPHENHYDRAMINE HYDROCHLORIDE 50 MG/ML
12.5 INJECTION INTRAMUSCULAR; INTRAVENOUS
Status: DISCONTINUED | OUTPATIENT
Start: 2021-11-15 | End: 2021-11-15 | Stop reason: HOSPADM

## 2021-11-15 RX ORDER — OXYCODONE HYDROCHLORIDE AND ACETAMINOPHEN 5; 325 MG/1; MG/1
2 TABLET ORAL PRN
Status: DISCONTINUED | OUTPATIENT
Start: 2021-11-15 | End: 2021-11-15 | Stop reason: HOSPADM

## 2021-11-15 RX ORDER — ROCURONIUM BROMIDE 10 MG/ML
INJECTION, SOLUTION INTRAVENOUS PRN
Status: DISCONTINUED | OUTPATIENT
Start: 2021-11-15 | End: 2021-11-15 | Stop reason: SDUPTHER

## 2021-11-15 RX ORDER — PROMETHAZINE HYDROCHLORIDE 25 MG/ML
6.25 INJECTION, SOLUTION INTRAMUSCULAR; INTRAVENOUS
Status: DISCONTINUED | OUTPATIENT
Start: 2021-11-15 | End: 2021-11-15 | Stop reason: HOSPADM

## 2021-11-15 RX ORDER — SODIUM CHLORIDE 9 MG/ML
INJECTION INTRAVENOUS
Status: DISCONTINUED
Start: 2021-11-15 | End: 2021-11-15 | Stop reason: HOSPADM

## 2021-11-15 RX ORDER — ONDANSETRON 2 MG/ML
INJECTION INTRAMUSCULAR; INTRAVENOUS PRN
Status: DISCONTINUED | OUTPATIENT
Start: 2021-11-15 | End: 2021-11-15 | Stop reason: SDUPTHER

## 2021-11-15 RX ORDER — OXYCODONE HYDROCHLORIDE AND ACETAMINOPHEN 5; 325 MG/1; MG/1
1 TABLET ORAL EVERY 6 HOURS PRN
Qty: 28 TABLET | Refills: 0 | Status: SHIPPED | OUTPATIENT
Start: 2021-11-15 | End: 2021-11-22

## 2021-11-15 RX ORDER — LIDOCAINE HYDROCHLORIDE 10 MG/ML
INJECTION, SOLUTION EPIDURAL; INFILTRATION; INTRACAUDAL; PERINEURAL PRN
Status: DISCONTINUED | OUTPATIENT
Start: 2021-11-15 | End: 2021-11-15 | Stop reason: SDUPTHER

## 2021-11-15 RX ORDER — SODIUM CHLORIDE 9 MG/ML
25 INJECTION, SOLUTION INTRAVENOUS PRN
Status: DISCONTINUED | OUTPATIENT
Start: 2021-11-15 | End: 2021-11-15 | Stop reason: HOSPADM

## 2021-11-15 RX ORDER — SCOLOPAMINE TRANSDERMAL SYSTEM 1 MG/1
1 PATCH, EXTENDED RELEASE TRANSDERMAL ONCE
Status: DISCONTINUED | OUTPATIENT
Start: 2021-11-15 | End: 2021-11-15 | Stop reason: HOSPADM

## 2021-11-15 RX ORDER — PROPOFOL 10 MG/ML
INJECTION, EMULSION INTRAVENOUS PRN
Status: DISCONTINUED | OUTPATIENT
Start: 2021-11-15 | End: 2021-11-15 | Stop reason: SDUPTHER

## 2021-11-15 RX ORDER — GINSENG 100 MG
CAPSULE ORAL
Status: DISCONTINUED
Start: 2021-11-15 | End: 2021-11-15 | Stop reason: HOSPADM

## 2021-11-15 RX ORDER — SODIUM CHLORIDE 0.9 % (FLUSH) 0.9 %
10 SYRINGE (ML) INJECTION PRN
Status: DISCONTINUED | OUTPATIENT
Start: 2021-11-15 | End: 2021-11-15 | Stop reason: HOSPADM

## 2021-11-15 RX ORDER — PHENYLEPHRINE HCL IN 0.9% NACL 1 MG/10 ML
SYRINGE (ML) INTRAVENOUS PRN
Status: DISCONTINUED | OUTPATIENT
Start: 2021-11-15 | End: 2021-11-15 | Stop reason: SDUPTHER

## 2021-11-15 RX ORDER — FENTANYL CITRATE 50 UG/ML
INJECTION, SOLUTION INTRAMUSCULAR; INTRAVENOUS PRN
Status: DISCONTINUED | OUTPATIENT
Start: 2021-11-15 | End: 2021-11-15 | Stop reason: SDUPTHER

## 2021-11-15 RX ORDER — MEPERIDINE HYDROCHLORIDE 50 MG/ML
12.5 INJECTION INTRAMUSCULAR; INTRAVENOUS; SUBCUTANEOUS EVERY 5 MIN PRN
Status: DISCONTINUED | OUTPATIENT
Start: 2021-11-15 | End: 2021-11-15 | Stop reason: HOSPADM

## 2021-11-15 RX ORDER — LABETALOL 20 MG/4 ML (5 MG/ML) INTRAVENOUS SYRINGE
10 EVERY 10 MIN PRN
Status: DISCONTINUED | OUTPATIENT
Start: 2021-11-15 | End: 2021-11-15 | Stop reason: HOSPADM

## 2021-11-15 RX ORDER — SCOLOPAMINE TRANSDERMAL SYSTEM 1 MG/1
PATCH, EXTENDED RELEASE TRANSDERMAL
Status: DISCONTINUED
Start: 2021-11-15 | End: 2021-11-15 | Stop reason: HOSPADM

## 2021-11-15 RX ORDER — IBUPROFEN 800 MG/1
800 TABLET ORAL 2 TIMES DAILY PRN
Qty: 28 TABLET | Refills: 0 | Status: SHIPPED | OUTPATIENT
Start: 2021-11-15 | End: 2022-08-29 | Stop reason: ALTCHOICE

## 2021-11-15 RX ORDER — MIDAZOLAM HYDROCHLORIDE 2 MG/2ML
1 INJECTION, SOLUTION INTRAMUSCULAR; INTRAVENOUS ONCE
Status: DISCONTINUED | OUTPATIENT
Start: 2021-11-15 | End: 2021-11-15 | Stop reason: HOSPADM

## 2021-11-15 RX ORDER — MORPHINE SULFATE 2 MG/ML
2 INJECTION, SOLUTION INTRAMUSCULAR; INTRAVENOUS EVERY 5 MIN PRN
Status: DISCONTINUED | OUTPATIENT
Start: 2021-11-15 | End: 2021-11-15 | Stop reason: HOSPADM

## 2021-11-15 RX ORDER — DIAPER,BRIEF,INFANT-TODD,DISP
EACH MISCELLANEOUS PRN
Status: DISCONTINUED | OUTPATIENT
Start: 2021-11-15 | End: 2021-11-15 | Stop reason: ALTCHOICE

## 2021-11-15 RX ORDER — ONDANSETRON 4 MG/1
4 TABLET, FILM COATED ORAL EVERY 8 HOURS PRN
Qty: 20 TABLET | Refills: 0 | Status: SHIPPED | OUTPATIENT
Start: 2021-11-15 | End: 2022-02-12

## 2021-11-15 RX ORDER — SODIUM CHLORIDE 9 MG/ML
INJECTION INTRAVENOUS PRN
Status: DISCONTINUED | OUTPATIENT
Start: 2021-11-15 | End: 2021-11-15 | Stop reason: ALTCHOICE

## 2021-11-15 RX ORDER — ONDANSETRON 2 MG/ML
4 INJECTION INTRAMUSCULAR; INTRAVENOUS
Status: DISCONTINUED | OUTPATIENT
Start: 2021-11-15 | End: 2021-11-15 | Stop reason: HOSPADM

## 2021-11-15 RX ADMIN — ROCURONIUM BROMIDE 50 MG: 10 INJECTION INTRAVENOUS at 13:42

## 2021-11-15 RX ADMIN — MIDAZOLAM HYDROCHLORIDE 2 MG: 1 INJECTION, SOLUTION INTRAMUSCULAR; INTRAVENOUS at 13:42

## 2021-11-15 RX ADMIN — Medication 0.4 MG: at 14:51

## 2021-11-15 RX ADMIN — FENTANYL CITRATE 50 MCG: 50 INJECTION, SOLUTION INTRAMUSCULAR; INTRAVENOUS at 13:42

## 2021-11-15 RX ADMIN — DEXAMETHASONE SODIUM PHOSPHATE 10 MG: 10 INJECTION INTRAMUSCULAR; INTRAVENOUS at 13:43

## 2021-11-15 RX ADMIN — Medication 0.4 MG: at 14:58

## 2021-11-15 RX ADMIN — PROPOFOL INJECTABLE EMULSION 180 MG: 10 INJECTION, EMULSION INTRAVENOUS at 13:42

## 2021-11-15 RX ADMIN — ONDANSETRON 4 MG: 2 INJECTION, SOLUTION INTRAMUSCULAR; INTRAVENOUS at 14:38

## 2021-11-15 RX ADMIN — Medication 100 MCG: at 14:28

## 2021-11-15 RX ADMIN — FAMOTIDINE 20 MG: 10 INJECTION, SOLUTION INTRAVENOUS at 12:36

## 2021-11-15 RX ADMIN — LIDOCAINE HYDROCHLORIDE 50 MG: 10 INJECTION, SOLUTION EPIDURAL; INFILTRATION; INTRACAUDAL; PERINEURAL at 13:42

## 2021-11-15 RX ADMIN — Medication 2 MG: at 14:58

## 2021-11-15 RX ADMIN — CEFOXITIN 2000 MG: 2 INJECTION, POWDER, FOR SOLUTION INTRAVENOUS at 13:50

## 2021-11-15 RX ADMIN — SODIUM CHLORIDE, POTASSIUM CHLORIDE, SODIUM LACTATE AND CALCIUM CHLORIDE: 600; 310; 30; 20 INJECTION, SOLUTION INTRAVENOUS at 13:42

## 2021-11-15 RX ADMIN — APREPITANT 40 MG: 40 CAPSULE ORAL at 12:32

## 2021-11-15 RX ADMIN — Medication 2 MG: at 14:51

## 2021-11-15 RX ADMIN — SODIUM CHLORIDE, POTASSIUM CHLORIDE, SODIUM LACTATE AND CALCIUM CHLORIDE: 600; 310; 30; 20 INJECTION, SOLUTION INTRAVENOUS at 15:02

## 2021-11-15 ASSESSMENT — PULMONARY FUNCTION TESTS
PIF_VALUE: 32
PIF_VALUE: 30
PIF_VALUE: 33
PIF_VALUE: 25
PIF_VALUE: 31
PIF_VALUE: 31
PIF_VALUE: 27
PIF_VALUE: 31
PIF_VALUE: 31
PIF_VALUE: 25
PIF_VALUE: 32
PIF_VALUE: 2
PIF_VALUE: 3
PIF_VALUE: 31
PIF_VALUE: 23
PIF_VALUE: 0
PIF_VALUE: 0
PIF_VALUE: 7
PIF_VALUE: 27
PIF_VALUE: 22
PIF_VALUE: 35
PIF_VALUE: 33
PIF_VALUE: 18
PIF_VALUE: 18
PIF_VALUE: 2
PIF_VALUE: 22
PIF_VALUE: 31
PIF_VALUE: 26
PIF_VALUE: 32
PIF_VALUE: 17
PIF_VALUE: 25
PIF_VALUE: 28
PIF_VALUE: 33
PIF_VALUE: 2
PIF_VALUE: 30
PIF_VALUE: 31
PIF_VALUE: 20
PIF_VALUE: 14
PIF_VALUE: 31
PIF_VALUE: 22
PIF_VALUE: 21
PIF_VALUE: 22
PIF_VALUE: 1
PIF_VALUE: 31
PIF_VALUE: 31
PIF_VALUE: 16
PIF_VALUE: 30
PIF_VALUE: 32
PIF_VALUE: 31
PIF_VALUE: 22
PIF_VALUE: 18
PIF_VALUE: 33
PIF_VALUE: 23
PIF_VALUE: 33
PIF_VALUE: 30
PIF_VALUE: 21
PIF_VALUE: 33
PIF_VALUE: 27
PIF_VALUE: 31
PIF_VALUE: 15
PIF_VALUE: 3
PIF_VALUE: 10
PIF_VALUE: 40
PIF_VALUE: 32
PIF_VALUE: 20
PIF_VALUE: 28
PIF_VALUE: 2
PIF_VALUE: 31
PIF_VALUE: 2
PIF_VALUE: 31
PIF_VALUE: 18
PIF_VALUE: 18
PIF_VALUE: 22
PIF_VALUE: 31
PIF_VALUE: 31
PIF_VALUE: 19
PIF_VALUE: 22
PIF_VALUE: 32

## 2021-11-15 ASSESSMENT — PAIN - FUNCTIONAL ASSESSMENT
PAIN_FUNCTIONAL_ASSESSMENT: ACTIVITIES ARE NOT PREVENTED
PAIN_FUNCTIONAL_ASSESSMENT: 0-10

## 2021-11-15 ASSESSMENT — PAIN DESCRIPTION - DESCRIPTORS: DESCRIPTORS: ACHING

## 2021-11-15 ASSESSMENT — PAIN SCALES - GENERAL: PAINLEVEL_OUTOF10: 0

## 2021-11-15 NOTE — ANESTHESIA PRE PROCEDURE
Department of Anesthesiology  Preprocedure Note       Name:  Gordon Beck   Age:  49 y.o.  :  1970                                          MRN:  [id number]         Date:  11/15/2021      Surgeon: Chiqui Lucia):  DO oNrth Ortiz DO    Procedure: Procedure(s):  COLONOSCOPY DIAGNOSTIC  HEMORRHOIDECTOMY    Medications prior to admission:   Prior to Admission medications    Medication Sig Start Date End Date Taking?  Authorizing Provider   Psyllium (METAMUCIL PO) Take by mouth three times daily   Yes Historical Provider, MD   lidocaine-Hydrocort 3-0.5 % cream Place 1 Tube around the anus 2 times daily 10/26/21  Yes Kita Cuellar MD   hydrocortisone (ANUSOL-HC) 25 MG suppository Place 1 suppository rectally every 12 hours 10/11/21  Yes MARTY Díaz - CNP   enalapril (VASOTEC) 10 MG tablet Take 1 tablet by mouth daily 21  Yes Ginger Chua MD   hydroCHLOROthiazide (HYDRODIURIL) 25 MG tablet Take 1 tablet by mouth every morning 21  Yes Ginger Chua MD   simvastatin (ZOCOR) 40 MG tablet Take 1 tablet by mouth every evening 21  Yes Ginger Chua MD   verapamil [facility] 360 MG extended release capsule Take 1 capsule by mouth daily 21  Yes Ginger Chua MD       Current medications:    Current Facility-Administered Medications   Medication Dose Route Frequency Provider Last Rate Last Admin    cefOXitin (MEFOXIN) 2000 mg in dextrose 5% 50 mL (mini-bag)  2,000 mg IntraVENous Once Rosi Diaz DO        0.9 % sodium chloride infusion   IntraVENous Continuous Merced Summers MD        lactated ringers infusion   IntraVENous Continuous Merced Summers MD        sodium chloride flush 0.9 % injection 10 mL  10 mL IntraVENous 2 times per day Merced Summers MD        sodium chloride flush 0.9 % injection 10 mL  10 mL IntraVENous PRN Merced Summers MD        0.9 % sodium chloride infusion  25 mL IntraVENous PRN Merced Summers MD           Allergies:  No Known Allergies    Problem List:    Patient Active Problem List   Diagnosis Code    Mixed hyperlipidemia E78.2    Benign essential HTN I10    Adult BMI > 30 TPH7860    External hemorrhoid K64.4       Past Medical History:        Diagnosis Date    Hypertension     Morbid obesity with BMI of 45.0-49.9, adult ([address]     MVP (mitral valve prolapse)     PONV (postoperative nausea and vomiting)     after gallbladder surgery       Past Surgical History:        Procedure Laterality Date    CHOLECYSTECTOMY      CYSTOSCOPY      DILATION AND CURETTAGE OF UTERUS      Hysteroscopy and Endometrial Ablation    ENDOMETRIAL ABLATION      HERNIA REPAIR      UMBILICAL HERNIA REPAIR  2003       Social History:    Social History     Tobacco Use    Smoking status: Never Smoker    Smokeless tobacco: Never Used   Substance Use Topics    Alcohol use: No     Comment: rare                                Counseling given: Not Answered      Vital Signs (Current): There were no vitals filed for this visit.                                            BP Readings from Last 3 Encounters:   10/11/21 (!) 151/85   10/01/21 124/76   07/23/21 (!) 146/94       NPO Status: Time of last liquid consumption: 0800 (sip water)                        Time of last solid consumption: 1530                        Date of last liquid consumption: 11/15/21                        Date of last solid food consumption: 11/13/21    BMI:   Wt Readings from Last 3 Encounters:   11/10/21 253 lb (114.8 kg)   10/11/21 249 lb (112.9 kg)   10/01/21 249 lb (112.9 kg)     There is no height or weight on file to calculate BMI.    CBC:   Lab Results   Component Value Date    WBC 9.3 11/10/2021    RBC 4.14 11/10/2021    HGB 11.5 11/10/2021    HCT 37.0 11/10/2021    MCV 89.4 11/10/2021    RDW 12.8 11/10/2021     11/10/2021       CMP:   Lab Results   Component Value Date     11/10/2021    K 4.4 11/10/2021     11/10/2021    CO2 23 11/10/2021    BUN 15 11/10/2021    CREATININE 0.64 11/10/2021    GFRAA >60 11/10/2021    LABGLOM >60 11/10/2021    GLUCOSE 100 11/10/2021    PROT 7.6 07/16/2021    CALCIUM 9.6 11/10/2021    BILITOT 0.67 07/16/2021    ALKPHOS 122 07/16/2021    AST 22 07/16/2021    ALT 27 07/16/2021       POC Tests: No results for input(s): POCGLU, POCNA, POCK, POCCL, POCBUN, POCHEMO, POCHCT in the last 72 hours. Coags: No results found for: PROTIME, INR, APTT    HCG (If Applicable):   Lab Results   Component Value Date    HCG NEGATIVE 11/15/2021        ABGs: No results found for: PHART, PO2ART, XVB1KBF, CLP0XAT, BEART, X1HHLJHF     Type & Screen (If Applicable):  No results found for: LABABO, LABRH    Drug/Infectious Status (If Applicable):  No results found for: HIV, HEPCAB    COVID-19 Screening (If Applicable): No results found for: COVID19        Anesthesia Evaluation  Patient summary reviewed and Nursing notes reviewed   history of anesthetic complications: PONV. Airway: Mallampati: II  TM distance: >3 FB   Neck ROM: full  Mouth opening: > = 3 FB Dental: normal exam         Pulmonary:Negative Pulmonary ROS and normal exam                               Cardiovascular:    (+) hypertension:,       ECG reviewed                     ROS comment: -EKG - SR @ 65     Neuro/Psych:   Negative Neuro/Psych ROS              GI/Hepatic/Renal:   (+) morbid obesity          Endo/Other: Negative Endo/Other ROS                     ROS comment: -NPO AFTER MIDNIGHT  -NKDA Abdominal:             Vascular: negative vascular ROS. Other Findings:             Anesthesia Plan      general     ASA 2     (GETA, PRONE)  Induction: intravenous. MIPS: Postoperative opioids intended and Prophylactic antiemetics administered. Anesthetic plan and risks discussed with patient. Plan discussed with CRNA.     Attending anesthesiologist reviewed and agrees with Charla Esqueda MD   11/15/2021

## 2021-11-15 NOTE — ANESTHESIA POSTPROCEDURE EVALUATION
Department of Anesthesiology  Postprocedure Note    Patient: Hugh Serrato  MRN: [id number]  YOB: 1970  Date of evaluation: 11/15/2021  Time:  3:16 PM     Procedure Summary     Date: 11/15/21 Room / Location: [address] [place] [address] / [address] [facility]    Anesthesia Start: 3663 Anesthesia Stop: 4312    Procedures:       COLONOSCOPY DIAGNOSTIC (N/A Anus)      HEMORRHOIDECTOMY (N/A )      HEMORRHOIDECTOMY (N/A ) Diagnosis: (K64.9  HEMORRHOIDS)    Surgeons: Shawna Gutierrez DO Responsible Provider: Hoang Fitch MD    Anesthesia Type: general ASA Status: 2          Anesthesia Type: general    Mta Phase I: Mat Score: 6    Mat Phase II:      Last vitals: Reviewed and per EMR flowsheets.        Anesthesia Post Evaluation    Patient location during evaluation: PACU  Patient participation: complete - patient participated  Level of consciousness: awake and alert  Airway patency: patent  Nausea & Vomiting: no nausea and no vomiting  Complications: no  Cardiovascular status: hemodynamically stable  Respiratory status: nasal cannula and spontaneous ventilation  Hydration status: euvolemic  Multimodal analgesia pain management approach

## 2021-11-16 NOTE — PROGRESS NOTES
CLINICAL PHARMACY NOTE: MEDS TO BEDS    Total # of Prescriptions Filled: 3   The following medications were delivered to the patient:  · Ibuprofen 800  · Percocet 5-325  · Zofran 4mg    Additional Documentation:

## 2021-11-17 LAB — SURGICAL PATHOLOGY REPORT: NORMAL

## 2021-12-01 ENCOUNTER — OFFICE VISIT (OUTPATIENT)
Dept: SURGERY | Age: 51
End: 2021-12-01

## 2021-12-01 VITALS — RESPIRATION RATE: 18 BRPM | HEART RATE: 90 BPM

## 2021-12-01 DIAGNOSIS — Z09 POSTOP CHECK: Primary | ICD-10-CM

## 2021-12-01 PROCEDURE — 99024 POSTOP FOLLOW-UP VISIT: CPT | Performed by: SURGERY

## 2021-12-01 NOTE — PROGRESS NOTES
12/1/2021 postop    Scott Ortiz is here for postop check, just over 2 weeks postop colonoscopy and hemorrhoidectomy. Site checked. No infection. Still swelling and drainage as expected. The vicryl sutures are starting to dissolve and work their way out. She is doing sitz baths daily. She is tailoring her medications for constipation control after detailed advice/discussion. No bleeding. No fevers or chills. Negative colonoscopy for masses or polyps. She will return 4 weeks. [facility]icare samples given. Ok to return to work Monday, work note typed out. Surgical Pathology Report  SURGICAL PATHOLOGY REPORT  Collected: 11/15/21 1001   Lab status: Final   Resulting lab: [facility]   Value: -- Diagnosis --     Anal region:   Hemorrhoids.       Martha Jones M.D.   **Electronically Signed Out**         rdd/11/17/2021         Clinical Information   Pre-op Diagnosis:  HEMORRHOIDS   Operative Findings:  HEMORRHOIDS   Operation Performed:  COLONOSCOPY DIAGNOSTIC, HEMORRHOIDECTOMY     Source of Specimen   1: HEMORRHOIDS     Gross Description   \"ROSARIO SALEH, HEMORRHOIDS\" Two wrinkled gray-tan fragments,   1.8 x 0.6 x 0.4 cm and 2.6 x 0.5 x 0.3 cm.  Entirely 1cs.  tm       Microscopic Description   Microscopic examination performed. SURGICAL PATHOLOGY CONSULTATION         Patient Name: Mary Jo [facility]: [id number]   Path Number: [id number]     [address], [address]   [phone]   Fax: [phone]        Operative Note        Patient: Jonathon Hammonds  YOB: 1970  MRN: [id number]   Zachary Kelsey MD        Date of Procedure: 11/15/2021     Pre-Op Diagnosis: screening colonoscopy.  Symptomatic hemorrhoids.     Post-Op Diagnosis: Same       Procedure(s):  1) screening colonoscopy  2) internal hemorrhoidectomy x 2 columns     Surgeon(s):  Bertha Merino DO     Anesthesia: General     Estimated Blood Loss (mL): less than 5 ml     Complications: None     Specimens:   ID Type Source Tests Collected by Time Destination   A : HEMORRHOIDS Tissue Tissue SURGICAL PATHOLOGY Gema Andre DO 11/15/2021 1435        Procedure(s) Details:     1) colonoscopy:     Please see H&P for indications for the above named procedure. Patient was brought to the endoscopy suite and placed under monitored anesthesia. Patient was positioned in left lateral position. Time out called and procedure confirmed. The lubricated variable stiffness pediatric colonoscope was carefully passed under direct vision into the rectum, advanced through the sigmoid colon, transverse colon, ascending colon and the cecum. The ileocecal valve was well visualized.      Findings:      Cecum: normal cecal pouch. IC valve and appendiceal orifice well confirmed  Ascending: normal  Transverse: normal  Descending: normal, redundant  Sigmoid: normal, redundant  Rectum: normal  Rectal exam: hemorrhoids  Bowel prep quality: excellent     At the distal 1/3 of the rectum, the scope was retroflexed and was negative. The patient tolerated the procedure well.       2) hemorrhoidectomy:     Pre-op IV antibiotics administered. After colonoscopy patient is carefully positioned in prone laurent-knife position with protective padding for joints, etc. Perineum and anorectum are prepped with betadine solution. Buttocks taped apart to optimize exposure. Site is draped in sterile fashion. Exparel + saline mixture infiltrated subcutaneously circumferentially. Lubricated Amandeep- Hill retractor inserted and exam performed. Needle tip Bovie used to score around the prolapsed internal hemorrhoids in 2 columns. Care is taken to stay above the sphincter muscles. Mini Ligasure oriented in radial fashion and used to excise the hemorrhoidal tissue staying well above the sphincter muscles while using an Allis clamp to elevate the tissue off the muscle.  Specimens were submitted. The Allis was at the apex and running locking 3-0 vicryl used to approximate the mucosal edges and maintain hemostasis. Care is taken to avoid excising the skin of the anus to prevent later stenosis. Specimens submitted. Site well irrigated, re-inspected. Hemostasis noted. Antibiotic ointment generously applied, rolled Gelfoam inserted. Tolerated well. All sponge, needle, and instruments counts correct. I spoke with family afterwards.  Detailed postop instructions provided orally and typed out to take home.        Electronically signed by Dipti Gore DO, FACOS, FACS on 11/15/2021 at 9:53 PM

## 2021-12-01 NOTE — LETTER
[facility] [place] [address]  Phone: [phone]  Fax: [phone]        December 1, 2021     Patient: Hugh Serrato   YOB: 1970   Dateof Visit: 12/1/2021       To Whom It May Concern:    Hugh Serrato had surgery and may return to work Monday December 6, 2021 without restrictions. If you have any questions or concerns, please do not hesitate to call. Sincerely,        JOSIE Patton Mc, MPH, [facility]  [facility] [address] office [phone]

## 2021-12-04 LAB
EKG ATRIAL RATE: 65 BPM
EKG P AXIS: 33 DEGREES
EKG P-R INTERVAL: 128 MS
EKG Q-T INTERVAL: 422 MS
EKG QRS DURATION: 94 MS
EKG QTC CALCULATION (BAZETT): 438 MS
EKG R AXIS: 34 DEGREES
EKG T AXIS: 36 DEGREES
EKG VENTRICULAR RATE: 65 BPM

## 2021-12-15 PROBLEM — Z12.11 SCREEN FOR COLON CANCER: Status: RESOLVED | Noted: 2021-11-15 | Resolved: 2021-12-15

## 2022-01-07 ENCOUNTER — OFFICE VISIT (OUTPATIENT)
Dept: SURGERY | Age: 52
End: 2022-01-07

## 2022-01-07 VITALS — HEART RATE: 75 BPM | RESPIRATION RATE: 18 BRPM

## 2022-01-07 DIAGNOSIS — Z09 POSTOP CHECK: Primary | ICD-10-CM

## 2022-01-07 PROCEDURE — 99024 POSTOP FOLLOW-UP VISIT: CPT | Performed by: SURGERY

## 2022-01-08 NOTE — PROGRESS NOTES
22 postop    8 weeks postop hemorrhoidectomy/colonoscopy. Site inspected. Healed well. Doing well. Advised to continue miralax prn and metamucil. Minimal swelling noted. All questions answered. She is in good spirits. She may call/return prn. Here with . Thank you Beth Ramirez MD for allowing me to participate in the care of your patients. 2021 postop    Himanshu Gonzalez is here for postop check, just over 2 weeks postop colonoscopy and hemorrhoidectomy. Site checked. No infection. Still swelling and drainage as expected. The vicryl sutures are starting to dissolve and work their way out. She is doing sitz baths daily. She is tailoring her medications for constipation control after detailed advice/discussion. No bleeding. No fevers or chills. Negative colonoscopy for masses or polyps. She will return 4 weeks. Recticare samples given. Ok to return to work Monday, work note typed out. Surgical Pathology Report  SURGICAL PATHOLOGY REPORT  Collected: 11/15/21 1001   Lab status: Final   Resulting lab: [facility]   Value: -- Diagnosis --     Anal region:   Hemorrhoids.       Elias Amador M.D.   **Electronically Signed Out**         rdd/2021         Clinical Information   Pre-op Diagnosis:  HEMORRHOIDS   Operative Findings:  HEMORRHOIDS   Operation Performed:  COLONOSCOPY DIAGNOSTIC, HEMORRHOIDECTOMY     Source of Specimen   1: HEMORRHOIDS     Gross Description   \"ROSARIO SALEH, HEMORRHOIDS\" Two wrinkled gray-tan fragments,   1.8 x 0.6 x 0.4 cm and 2.6 x 0.5 x 0.3 cm.  Entirely 1cs.  tm       Microscopic Description   Microscopic examination performed.      SURGICAL PATHOLOGY CONSULTATION         Patient Name: Jose Holland: [id number]   Path Number: [id number]     [address],  [address]. [place], [address]   [phone]   Fax: [phone]        Operative Note        Patient: Liane Osgood  YOB: 1970  MRN: [id number]   Shawna Lake MD        Date of Procedure: 11/15/2021     Pre-Op Diagnosis: screening colonoscopy. Symptomatic hemorrhoids.     Post-Op Diagnosis: Same       Procedure(s):  1) screening colonoscopy  2) internal hemorrhoidectomy x 2 columns     Surgeon(s):  Emilia Bishop,      Anesthesia: General     Estimated Blood Loss (mL): less than 5 ml     Complications: None     Specimens:   ID Type Source Tests Collected by Time Destination   A : HEMORRHOIDS Tissue Tissue SURGICAL PATHOLOGY Bolivarwaine Drain, DO 11/15/2021 1435        Procedure(s) Details:     1) colonoscopy:     Please see H&P for indications for the above named procedure. Patient was brought to the endoscopy suite and placed under monitored anesthesia. Patient was positioned in left lateral position. Time out called and procedure confirmed. The lubricated variable stiffness pediatric colonoscope was carefully passed under direct vision into the rectum, advanced through the sigmoid colon, transverse colon, ascending colon and the cecum. The ileocecal valve was well visualized.      Findings:      Cecum: normal cecal pouch. IC valve and appendiceal orifice well confirmed  Ascending: normal  Transverse: normal  Descending: normal, redundant  Sigmoid: normal, redundant  Rectum: normal  Rectal exam: hemorrhoids  Bowel prep quality: excellent     At the distal 1/3 of the rectum, the scope was retroflexed and was negative. The patient tolerated the procedure well.       2) hemorrhoidectomy:     Pre-op IV antibiotics administered. After colonoscopy patient is carefully positioned in prone laurent-knife position with protective padding for joints, etc. Perineum and anorectum are prepped with betadine solution. Buttocks taped apart to optimize exposure. Site is draped in sterile fashion. Exparel + saline mixture infiltrated subcutaneously circumferentially.  C[place]en [place] retractor inserted and exam performed. Needle tip Bovie used to score around the prolapsed internal hemorrhoids in 2 columns. Care is taken to stay above the sphincter muscles. Mini Ligasure oriented in radial fashion and used to excise the hemorrhoidal tissue staying well above the sphincter muscles while using an Allis clamp to elevate the tissue off the muscle. Specimens were submitted. The Allis was at the apex and running locking 3-0 vicryl used to approximate the mucosal edges and maintain hemostasis. Care is taken to avoid excising the skin of the anus to prevent later stenosis. Specimens submitted. Site well irrigated, re-inspected. Hemostasis noted. Antibiotic ointment generously applied, rolled Gelfoam inserted. Tolerated well. All sponge, needle, and instruments counts correct. I spoke with family afterwards.  Detailed postop instructions provided orally and typed out to take home.        Electronically signed by Lorena Prasad DO, FACOS, FACS on 11/15/2021 at 9:53 PM

## 2022-01-23 ASSESSMENT — ENCOUNTER SYMPTOMS
BLOOD IN STOOL: 1
ANAL BLEEDING: 1
SHORTNESS OF BREATH: 0
NAUSEA: 0
CHOKING: 0
BACK PAIN: 0
COUGH: 0
ABDOMINAL PAIN: 0
TROUBLE SWALLOWING: 0
SORE THROAT: 0
VOMITING: 0

## 2022-01-24 NOTE — PATIENT INSTRUCTIONS
Patient Education        Hemorrhoids: Care Instructions  Overview     Hemorrhoids are swollen veins that develop in the anal canal. Bleeding during bowel movements, itching, and rectal pain are the most common symptoms. Hemorrhoids can be uncomfortable at times, but rarely are they a serious problem. Most of the time, you can treat them with simple changes to your diet and bowel habits. These changes include eating more fiber and not straining to pass stools. Most hemorrhoids don't need surgery or other treatment unless they are very large and painful or bleed a lot. Follow-up care is a key part of your treatment and safety. Be sure to make and go to all appointments, and call your doctor if you are having problems. It's also a good idea to know your test results and keep a list of the medicines you take. How can you care for yourself at home? · Sit in a few inches of warm water (sitz bath) 3 times a day and after bowel movements. The warm water helps with pain and itching. · Put ice on your anal area several times a day for 10 minutes at a time. Put a thin cloth between the ice and your skin. Follow this by placing a warm, wet towel on the area for another 10 to 20 minutes. · Take pain medicines exactly as directed. ? If the doctor gave you a prescription medicine for pain, take it as prescribed. ? If you are not taking a prescription pain medicine, ask your doctor if you can take an over-the-counter medicine. · Keep the anal area clean, but be gentle. Use water and a fragrance-free soap, or use baby wipes or medicated pads such as Tucks. · Wear cotton underwear and loose clothing to decrease moisture in the anal area. · Eat more fiber. Include foods such as whole-grain breads and cereals, raw vegetables, raw and dried fruits, and beans. · Drink plenty of fluids.  If you have kidney, heart, or liver disease and have to limit fluids, talk with your doctor before you increase the amount of fluids you drink.  · Use a stool softener that contains bran or psyllium. You can save money by buying bran or psyllium (available in bulk at most health food stores) and sprinkling it on foods or stirring it into fruit juice. Or you can use a product such as Metamucil or Hydrocil. · Practice healthy bowel habits. ? Go to the bathroom as soon as you have the urge. ? Avoid straining to pass stools. Relax and give yourself time to let things happen naturally. ? Do not hold your breath while passing stools. ? Do not read while sitting on the toilet. Get off the toilet as soon as you have finished. · Take your medicines exactly as prescribed. Call your doctor if you think you are having a problem with your medicine. When should you call for help? Call 911 anytime you think you may need emergency care. For example, call if:    · You pass maroon or very bloody stools. Call your doctor now or seek immediate medical care if:    · You have increased pain.     · You have increased bleeding. Watch closely for changes in your health, and be sure to contact your doctor if:    · Your symptoms have not improved after 3 or 4 days. Where can you learn more? Go to https://In[employer] Manager.Prosensa. org and sign in to your StudyMax account. Enter X034 in the Canlife box to learn more about \"Hemorrhoids: Care Instructions. \"     If you do not have an account, please click on the \"Sign Up Now\" link. Current as of: September 8, 2021               Content Version: 13.1  © 2006-2021 Healthwise, Incorporated. Care instructions adapted under license by [facility] ([facility]). If you have questions about a medical condition or this instruction, always ask your healthcare professional. [place] [address] any warranty or liability for your use of this information. Patient Education        Learning About Colonoscopy  What is a colonoscopy?      A colonoscopy is a test (also called a procedure) that lets a doctor look inside your large intestine. The doctor uses a thin, lighted tube called a colonoscope. The doctor uses it to look for small growths called polyps, colon or rectal cancer (colorectal cancer), or other problems like bleeding. During the procedure, the doctor can take samples of tissue. The samples can then be checked for cancer or other conditions. The doctor can also take out polyps. How is a colonoscopy done? This procedure is done in a doctor's office or a clinic or hospital. You will get medicine to help you relax and not feel pain. Some people find that they don't remember having the test because of the medicine. The doctor gently moves the colonoscope, or scope, through the colon. The scope is also a small video camera. It lets the doctor see the colon and take pictures. How do you prepare for the procedure? You need to clean out your colon before the procedure so the doctor can see your colon. This depends on which \"colon prep\" your doctor recommends. To clean out your colon, you'll do a \"colon prep\" before the test. This means you stop eating solid foods and drink only clear liquids. You can have water, tea, coffee, clear juices, clear broths, flavored ice pops, and gelatin (such as Jell-O). Do not drink anything red or purple. The day or night before the procedure, you drink a large amount of a special liquid. This causes loose, frequent stools. You will go to the bathroom a lot. Your doctor may have you drink part of the liquid the evening before and the rest on the day of the test. It's very important to drink all of the liquid. If you have problems drinking it, call your doctor. Arrange to have someone take you home after the test.  What can you expect after a colonoscopy? Your doctor will tell you when you can eat and do your usual activities. Drink a lot of fluid after the test to replace the fluids you may have lost during the colon prep. But don't drink alcohol.   Your doctor will talk to you about when you'll need your next colonoscopy. The results of your test and your risk for colorectal cancer will help your doctor decide how often you need to be checked. After the test, you may be bloated or have gas pains. You may need to pass gas. If a biopsy was done or a polyp was removed, you may have streaks of blood in your stool (feces) for a few days. Check with your doctor to see when it is safe to take aspirin and nonsteroidal anti-inflammatory drugs (NSAIDs) again. Problems such as heavy rectal bleeding may not occur until several weeks after the test. This isn't common. But it can happen after polyps are removed. Follow-up care is a key part of your treatment and safety. Be sure to make and go to all appointments, and call your doctor if you are having problems. It's also a good idea to know your test results and keep a list of the medicines you take. Where can you learn more? Go to https://MILL[employer]UM [employer].PaymentOne. org and sign in to your The Currency Cloud account. Enter S956 in the [employer] box to learn more about \"Learning About Colonoscopy. \"     If you do not have an account, please click on the \"Sign Up Now\" link. Current as of: September 8, 2021               Content Version: 13.1  © 3983-5092 Healthwise, Incorporated. Care instructions adapted under license by [facility] ([facility]). If you have questions about a medical condition or this instruction, always ask your healthcare professional. [place] [address] any warranty or liability for your use of this information.

## 2022-02-12 ENCOUNTER — HOSPITAL ENCOUNTER (EMERGENCY)
Age: 52
Discharge: HOME | End: 2022-02-12
Attending: EMERGENCY MEDICINE
Payer: COMMERCIAL

## 2022-02-12 ENCOUNTER — APPOINTMENT (OUTPATIENT)
Dept: GENERAL RADIOLOGY | Age: 52
End: 2022-02-12
Payer: COMMERCIAL

## 2022-02-12 VITALS
SYSTOLIC BLOOD PRESSURE: 156 MMHG | TEMPERATURE: 97.8 F | RESPIRATION RATE: 18 BRPM | OXYGEN SATURATION: 99 % | HEART RATE: 75 BPM | DIASTOLIC BLOOD PRESSURE: 75 MMHG

## 2022-02-12 DIAGNOSIS — S82.831A CLOSED FRACTURE OF DISTAL END OF RIGHT FIBULA, UNSPECIFIED FRACTURE MORPHOLOGY, INITIAL ENCOUNTER: Primary | ICD-10-CM

## 2022-02-12 PROCEDURE — 29515 APPLICATION SHORT LEG SPLINT: CPT

## 2022-02-12 PROCEDURE — 6370000000 HC RX 637 (ALT 250 FOR IP): Performed by: EMERGENCY MEDICINE

## 2022-02-12 PROCEDURE — 73610 X-RAY EXAM OF ANKLE: CPT

## 2022-02-12 PROCEDURE — 99283 EMERGENCY DEPT VISIT LOW MDM: CPT

## 2022-02-12 RX ORDER — ACETAMINOPHEN 325 MG/1
650 TABLET ORAL ONCE
Status: COMPLETED | OUTPATIENT
Start: 2022-02-12 | End: 2022-02-12

## 2022-02-12 RX ORDER — HYDROCODONE BITARTRATE AND ACETAMINOPHEN 5; 325 MG/1; MG/1
1 TABLET ORAL EVERY 6 HOURS PRN
Qty: 12 TABLET | Refills: 0 | Status: SHIPPED | OUTPATIENT
Start: 2022-02-12 | End: 2022-02-15

## 2022-02-12 RX ADMIN — ACETAMINOPHEN 650 MG: 325 TABLET, FILM COATED ORAL at 23:07

## 2022-02-12 ASSESSMENT — PAIN DESCRIPTION - LOCATION: LOCATION: ANKLE

## 2022-02-12 ASSESSMENT — PAIN DESCRIPTION - PAIN TYPE: TYPE: ACUTE PAIN

## 2022-02-12 ASSESSMENT — PAIN SCALES - GENERAL
PAINLEVEL_OUTOF10: 5
PAINLEVEL_OUTOF10: 5

## 2022-02-12 ASSESSMENT — PAIN DESCRIPTION - ORIENTATION: ORIENTATION: RIGHT

## 2022-02-12 NOTE — Clinical Note
Lydia Mata was seen and treated in our emergency department on 2/12/2022. She may return to work on 02/14/2022. After seen by orthopedic surgery     If you have any questions or concerns, please don't hesitate to call.       Gi Newton MD

## 2022-02-13 NOTE — ED PROVIDER NOTES
[facility] ED  EMERGENCY DEPARTMENT ENCOUNTER      Pt Name: Alfreda Neville  MRN: [id number]  [place] 1970  Date of evaluation: 2/12/2022  Provider: Millicent Duverney, MD    CHIEF COMPLAINT       Chief Complaint   Patient presents with    Ankle Pain     right, slipped on ice in parking lot on way into work         HISTORY OF PRESENT ILLNESS   (Location/Symptom, Timing/Onset, Context/Setting, Quality, Duration, Modifying Factors, Severity)  Note limiting factors. Alfreda Neville is a 49 y.o. female who presents to the emergency department      81-year-old female presented emergency department for evaluation of right ankle pain after slipping on the ice patient was at home and leaving for work when she slipped injuring her right ankle. States she has significant discomfort when she attempts to stand or walk on her right ankle. Did not strike her head. Did not lose consciousness. Denies any other injuries or any other acute concerns. She has  not taken anything at home for relief. Nursing Notes were reviewed. REVIEW OF SYSTEMS    (2-9 systems for level 4, 10 or more for level 5)     Review of Systems   All other systems reviewed and are negative. Except as noted above the remainder of the review of systems was reviewed and negative.        PAST MEDICAL HISTORY     Past Medical History:   Diagnosis Date    Hypertension     Morbid obesity with BMI of 45.0-49.9, adult ([address]     MVP (mitral valve prolapse)     PONV (postoperative nausea and vomiting)     after gallbladder surgery         SURGICAL HISTORY       Past Surgical History:   Procedure Laterality Date    CHOLECYSTECTOMY      COLONOSCOPY  11/15/2021    COLONOSCOPY N/A 11/15/2021    COLONOSCOPY DIAGNOSTIC performed by Rob Mitchell DO at [address]       Hysteroscopy and Endometrial Ablation    ENDOMETRIAL ABLATION      HEMORRHOID SURGERY N/A 11/15/2021    HEMORRHOIDECTOMY performed by Piter Álvarez DO at [place] [address]       Discharge Medication List as of 2/13/2022  2:40 AM      CONTINUE these medications which have NOT CHANGED    Details   Psyllium (METAMUCIL PO) Take by mouth three times dailyHistorical Med      enalapril (VASOTEC) 10 MG tablet Take 1 tablet by mouth daily, Disp-90 tablet, R-3Normal      hydroCHLOROthiazide (HYDRODIURIL) 25 MG tablet Take 1 tablet by mouth every morning, Disp-90 tablet, R-3Normal      simvastatin (ZOCOR) 40 MG tablet Take 1 tablet by mouth every evening, Disp-90 tablet, R-3Normal      verapamil (VERELAN) 360 MG extended release capsule Take 1 capsule by mouth daily, Disp-90 capsule, R-3Normal      ibuprofen (ADVIL;MOTRIN) 800 MG tablet Take 1 tablet by mouth 2 times daily as needed for Pain, Disp-28 tablet, R-0Print             ALLERGIES     Patient has no known allergies.     FAMILY HISTORY       Family History   Problem Relation Age of Onset    COPD Mother    [facility] Heart Disease Father     Pacemaker Sister     High Blood Pressure Sister     Heart Disease Brother           SOCIAL HISTORY       Social History     Socioeconomic History    Marital status:      Spouse name: Not on file    Number of children: Not on file    Years of education: Not on file    Highest education level: Not on file   Occupational History    Not on file   Tobacco Use    Smoking status: Never Smoker    Smokeless tobacco: Never Used   Vaping Use    Vaping Use: Never used   Substance and Sexual Activity    Alcohol use: No     Comment: rare    Drug use: No    Sexual activity: Yes     Partners: Male   Other Topics Concern    Not on file   Social History Narrative    Not on file     Social Determinants of Health     Financial Resource Strain: Low Risk     Difficulty of Paying Living Expenses: Not hard at all   Food Insecurity: No Food Insecurity    Worried About Running Out of Food in the Last Year: Never true    [place] of Food in the Last Year: Never true   Transportation Needs: No Transportation Needs    Lack of Transportation (Medical): No    Lack of Transportation (Non-Medical): No   Physical Activity:     Days of Exercise per Week: Not on file    Minutes of Exercise per Session: Not on file   Stress:     Feeling of Stress : Not on file   Social Connections:     Frequency of Communication with Friends and Family: Not on file    Frequency of Social Gatherings with Friends and Family: Not on file    Attends Jain Services: Not on file    Active Member of [address] [place] [address] [employer] or Organizations: Not on file    Attends Club or Organization Meetings: Not on file    Marital Status: Not on file   Intimate Partner Violence:     Fear of Current or Ex-Partner: Not on file    Emotionally Abused: Not on file    Physically Abused: Not on file    Sexually Abused: Not on file   Housing Stability:     Unable to Pay for Housing in the Last Year: Not on file    Number of [place] in the Last Year: Not on file    Unstable Housing in the Last Year: Not on file       SCREENINGS                        PHYSICAL EXAM    (up to 7 for level 4, 8 or more for level 5)     ED Triage Vitals [02/12/22 2252]   BP Temp Temp src Pulse Resp SpO2 Height Weight   (!) 156/75 97.8 °F (36.6 °C) -- 75 18 99 % -- --       Physical Exam  Vitals and nursing note reviewed. Constitutional:       Comments: Patient is uncomfortable but in no acute distress   HENT:      Head: Normocephalic. Cardiovascular:      Rate and Rhythm: Normal rate and regular rhythm. Pulmonary:      Effort: Pulmonary effort is normal. No respiratory distress. Breath sounds: Normal breath sounds. Musculoskeletal:      Comments: Right ankle lateral edema. No ecchymosis. No deformity. Distal neurovascular intact. Skin:     General: Skin is warm and dry.    Neurological:      General: No focal deficit present. Mental Status: She is alert and oriented to person, place, and time. DIAGNOSTIC RESULTS     EKG: All EKG's are interpreted by the Emergency Department Physician who either signs or Co-signs this chart in the absence of a cardiologist.        RADIOLOGY:   Non-plain film images such as CT, Ultrasound and MRI are read by the radiologist. Plain radiographic images are visualized and preliminarily interpreted by the emergency physician with the below findings:        Interpretation per the Radiologist below, if available at the time of this note:    XR ANKLE RIGHT (MIN 3 VIEWS)   Final Result   Acute nondisplaced spiral oblique fracture of the distal fibula. Widening of the medial ankle joint space consistent with ligamentous injury. ED BEDSIDE ULTRASOUND:   Performed by ED Physician - none    LABS:  Labs Reviewed - No data to display    All other labs were within normal range or not returned as of this dictation. EMERGENCY DEPARTMENT COURSE and DIFFERENTIAL DIAGNOSIS/MDM:   Vitals:    Vitals:    02/12/22 2252   BP: (!) 156/75   Pulse: 75   Resp: 18   Temp: 97.8 °F (36.6 °C)   SpO2: 99%           MDM  Number of Diagnoses or Management Options  Closed fracture of distal end of right fibula, unspecified fracture morphology, initial encounter  Diagnosis management comments: Extremity results reviewed. Patient does have an acute nondisplaced spiral oblique fracture of the distal fibula on the right. There is also some joint widening consistent with ligamentous injury. Results were discussed with patient. She was placed in a splint. Crutches were given she is also encouraged to try a walker if she is having difficult time with crutches. Follow-up instructions with Dr. Noah Garcia orthopedics were given. Patient was given Tylenol in the emergency department.   She is also given Norco to go pack and a prescription for Norco to use in place of Tylenol for pain not controlled with Tylenol. Home care instructions were discussed. Stable for discharge home. ED return and follow-up instructions discussed prior to discharge. MIPS       REASSESSMENT          CRITICAL CARE TIME   Total Critical Care time was  minutes, excluding separately reportable procedures. There was a high probability of clinically significant/life threatening deterioration in the patient's condition which required my urgent intervention. CONSULTS:  None    PROCEDURES:  Unless otherwise noted below, none     Procedures        FINAL IMPRESSION      1. Closed fracture of distal end of right fibula, unspecified fracture morphology, initial encounter          DISPOSITION/PLAN   DISPOSITION Decision To Discharge 02/12/2022 11:30:41 PM      PATIENT REFERRED TO:  Colby Solo MD  [address] [facility]  [phone]      Call Monday morning for earliest available appointment      DISCHARGE MEDICATIONS:  Discharge Medication List as of 2/13/2022  2:40 AM      START taking these medications    Details   HYDROcodone-acetaminophen (NORCO) 5-325 MG per tablet Take 1 tablet by mouth every 6 hours as needed for Pain for up to 3 days. Intended supply: 3 days. Take lowest dose possible to manage pain, Disp-12 tablet, R-0Print           Controlled Substances Monitoring:     No flowsheet data found.     (Please note that portions of this note were completed with a voice recognition program.  Efforts were made to edit the dictations but occasionally words are mis-transcribed.)    Gi Newton MD (electronically signed)  Attending Emergency Physician             Gi Newton MD  02/13/22 4160

## 2022-02-16 NOTE — PROGRESS NOTES
No COVID test required, patient fully vaccinated, COVID vaccination recorded in [facility]

## 2022-02-16 NOTE — PROGRESS NOTES
Patient instructed on the pre-operative, intra-operative, and post-operative process. Patient's surgical procedure and day of surgery confirmed. Patient instructed on NPO status. Medication instructions reviewed with patient. CHG pre-operative wash instructions reviewed with patient. Pre operative instruction sheet reviewed with patient per PAT phone interview. Patient instructed to only take Verapamil with small sip of water the morning of surgery.

## 2022-02-17 ENCOUNTER — ANESTHESIA EVENT (OUTPATIENT)
Dept: OPERATING ROOM | Age: 52
End: 2022-02-17
Payer: COMMERCIAL

## 2022-02-18 ENCOUNTER — ANESTHESIA (OUTPATIENT)
Dept: OPERATING ROOM | Age: 52
End: 2022-02-18
Payer: COMMERCIAL

## 2022-02-18 ENCOUNTER — HOSPITAL ENCOUNTER (OUTPATIENT)
Age: 52
Setting detail: OUTPATIENT SURGERY
Discharge: HOME | End: 2022-02-18
Attending: ORTHOPAEDIC SURGERY | Admitting: ORTHOPAEDIC SURGERY
Payer: COMMERCIAL

## 2022-02-18 ENCOUNTER — APPOINTMENT (OUTPATIENT)
Dept: GENERAL RADIOLOGY | Age: 52
End: 2022-02-18
Attending: ORTHOPAEDIC SURGERY
Payer: COMMERCIAL

## 2022-02-18 VITALS
BODY MASS INDEX: 48.69 KG/M2 | WEIGHT: 248 LBS | DIASTOLIC BLOOD PRESSURE: 91 MMHG | RESPIRATION RATE: 18 BRPM | TEMPERATURE: 97.8 F | SYSTOLIC BLOOD PRESSURE: 168 MMHG | HEART RATE: 72 BPM | OXYGEN SATURATION: 93 % | HEIGHT: 60 IN

## 2022-02-18 VITALS — OXYGEN SATURATION: 98 % | SYSTOLIC BLOOD PRESSURE: 109 MMHG | DIASTOLIC BLOOD PRESSURE: 63 MMHG

## 2022-02-18 PROCEDURE — 3600000004 HC SURGERY LEVEL 4 BASE: Performed by: ORTHOPAEDIC SURGERY

## 2022-02-18 PROCEDURE — 6360000002 HC RX W HCPCS: Performed by: NURSE ANESTHETIST, CERTIFIED REGISTERED

## 2022-02-18 PROCEDURE — 6370000000 HC RX 637 (ALT 250 FOR IP): Performed by: NURSE ANESTHETIST, CERTIFIED REGISTERED

## 2022-02-18 PROCEDURE — 7100000010 HC PHASE II RECOVERY - FIRST 15 MIN: Performed by: ORTHOPAEDIC SURGERY

## 2022-02-18 PROCEDURE — 6360000002 HC RX W HCPCS: Performed by: ORTHOPAEDIC SURGERY

## 2022-02-18 PROCEDURE — 2720000010 HC SURG SUPPLY STERILE: Performed by: ORTHOPAEDIC SURGERY

## 2022-02-18 PROCEDURE — 2580000003 HC RX 258: Performed by: ORTHOPAEDIC SURGERY

## 2022-02-18 PROCEDURE — 3600000014 HC SURGERY LEVEL 4 ADDTL 15MIN: Performed by: ORTHOPAEDIC SURGERY

## 2022-02-18 PROCEDURE — 7100000011 HC PHASE II RECOVERY - ADDTL 15 MIN: Performed by: ORTHOPAEDIC SURGERY

## 2022-02-18 PROCEDURE — 2500000003 HC RX 250 WO HCPCS: Performed by: NURSE ANESTHETIST, CERTIFIED REGISTERED

## 2022-02-18 PROCEDURE — 2580000003 HC RX 258: Performed by: NURSE ANESTHETIST, CERTIFIED REGISTERED

## 2022-02-18 PROCEDURE — 2709999900 HC NON-CHARGEABLE SUPPLY: Performed by: ORTHOPAEDIC SURGERY

## 2022-02-18 PROCEDURE — 3700000001 HC ADD 15 MINUTES (ANESTHESIA): Performed by: ORTHOPAEDIC SURGERY

## 2022-02-18 PROCEDURE — 3700000000 HC ANESTHESIA ATTENDED CARE: Performed by: ORTHOPAEDIC SURGERY

## 2022-02-18 PROCEDURE — C1713 ANCHOR/SCREW BN/BN,TIS/BN: HCPCS | Performed by: ORTHOPAEDIC SURGERY

## 2022-02-18 PROCEDURE — 64447 NJX AA&/STRD FEMORAL NRV IMG: CPT | Performed by: NURSE ANESTHETIST, CERTIFIED REGISTERED

## 2022-02-18 PROCEDURE — 3209999900 FLUORO FOR SURGICAL PROCEDURES

## 2022-02-18 DEVICE — PLATE BNE L92MM 4 H R LAT DST FIBULAR S STL VAR ANG LOK: Type: IMPLANTABLE DEVICE | Site: ANKLE | Status: FUNCTIONAL

## 2022-02-18 DEVICE — IMPLANTABLE DEVICE: Type: IMPLANTABLE DEVICE | Site: ANKLE | Status: FUNCTIONAL

## 2022-02-18 DEVICE — SCREW BNE L12MM DIA2.7MM ANK S STL ST VAR ANG LOK FULL THRD: Type: IMPLANTABLE DEVICE | Site: ANKLE | Status: FUNCTIONAL

## 2022-02-18 DEVICE — SCREW BNE L12MM DIA3.5MM CORT S STL ST FULL THRD T15: Type: IMPLANTABLE DEVICE | Site: ANKLE | Status: FUNCTIONAL

## 2022-02-18 DEVICE — SCREW BNE L16MM DIA2.7MM ANK S STL ST VAR ANG LOK FULL THRD: Type: IMPLANTABLE DEVICE | Site: ANKLE | Status: FUNCTIONAL

## 2022-02-18 RX ORDER — PROPOFOL 10 MG/ML
INJECTION, EMULSION INTRAVENOUS PRN
Status: DISCONTINUED | OUTPATIENT
Start: 2022-02-18 | End: 2022-02-18 | Stop reason: SDUPTHER

## 2022-02-18 RX ORDER — LIDOCAINE HYDROCHLORIDE 20 MG/ML
INJECTION, SOLUTION EPIDURAL; INFILTRATION; INTRACAUDAL; PERINEURAL PRN
Status: DISCONTINUED | OUTPATIENT
Start: 2022-02-18 | End: 2022-02-18 | Stop reason: SDUPTHER

## 2022-02-18 RX ORDER — ROPIVACAINE HYDROCHLORIDE 5 MG/ML
INJECTION, SOLUTION EPIDURAL; INFILTRATION; PERINEURAL PRN
Status: DISCONTINUED | OUTPATIENT
Start: 2022-02-18 | End: 2022-02-18 | Stop reason: SDUPTHER

## 2022-02-18 RX ORDER — DEXAMETHASONE SODIUM PHOSPHATE 10 MG/ML
INJECTION, SOLUTION INTRAMUSCULAR; INTRAVENOUS PRN
Status: DISCONTINUED | OUTPATIENT
Start: 2022-02-18 | End: 2022-02-18 | Stop reason: SDUPTHER

## 2022-02-18 RX ORDER — SCOLOPAMINE TRANSDERMAL SYSTEM 1 MG/1
1 PATCH, EXTENDED RELEASE TRANSDERMAL
Status: DISCONTINUED | OUTPATIENT
Start: 2022-02-18 | End: 2022-02-18 | Stop reason: HOSPADM

## 2022-02-18 RX ORDER — METOCLOPRAMIDE HYDROCHLORIDE 5 MG/ML
INJECTION INTRAMUSCULAR; INTRAVENOUS PRN
Status: DISCONTINUED | OUTPATIENT
Start: 2022-02-18 | End: 2022-02-18 | Stop reason: SDUPTHER

## 2022-02-18 RX ORDER — ACETAMINOPHEN 325 MG/1
650 TABLET ORAL ONCE
Status: COMPLETED | OUTPATIENT
Start: 2022-02-18 | End: 2022-02-18

## 2022-02-18 RX ORDER — DIMENHYDRINATE 50 MG/1
50 TABLET ORAL ONCE
Status: COMPLETED | OUTPATIENT
Start: 2022-02-18 | End: 2022-02-18

## 2022-02-18 RX ORDER — SODIUM CHLORIDE, SODIUM LACTATE, POTASSIUM CHLORIDE, CALCIUM CHLORIDE 600; 310; 30; 20 MG/100ML; MG/100ML; MG/100ML; MG/100ML
INJECTION, SOLUTION INTRAVENOUS CONTINUOUS
Status: DISCONTINUED | OUTPATIENT
Start: 2022-02-18 | End: 2022-02-18 | Stop reason: HOSPADM

## 2022-02-18 RX ORDER — MIDAZOLAM HYDROCHLORIDE 1 MG/ML
INJECTION INTRAMUSCULAR; INTRAVENOUS PRN
Status: DISCONTINUED | OUTPATIENT
Start: 2022-02-18 | End: 2022-02-18 | Stop reason: SDUPTHER

## 2022-02-18 RX ORDER — FENTANYL CITRATE 50 UG/ML
INJECTION, SOLUTION INTRAMUSCULAR; INTRAVENOUS PRN
Status: DISCONTINUED | OUTPATIENT
Start: 2022-02-18 | End: 2022-02-18 | Stop reason: SDUPTHER

## 2022-02-18 RX ADMIN — FAMOTIDINE 20 MG: 10 INJECTION, SOLUTION INTRAVENOUS at 15:59

## 2022-02-18 RX ADMIN — MIDAZOLAM 2 MG: 1 INJECTION INTRAMUSCULAR; INTRAVENOUS at 15:29

## 2022-02-18 RX ADMIN — DEXAMETHASONE SODIUM PHOSPHATE 10 MG: 10 INJECTION INTRAMUSCULAR; INTRAVENOUS at 15:08

## 2022-02-18 RX ADMIN — SODIUM CHLORIDE, POTASSIUM CHLORIDE, SODIUM LACTATE AND CALCIUM CHLORIDE: 600; 310; 30; 20 INJECTION, SOLUTION INTRAVENOUS at 16:51

## 2022-02-18 RX ADMIN — MIDAZOLAM 2 MG: 1 INJECTION INTRAMUSCULAR; INTRAVENOUS at 14:55

## 2022-02-18 RX ADMIN — FENTANYL CITRATE 50 MCG: 50 INJECTION INTRAMUSCULAR; INTRAVENOUS at 14:59

## 2022-02-18 RX ADMIN — LIDOCAINE HYDROCHLORIDE 40 MG: 20 INJECTION, SOLUTION EPIDURAL; INFILTRATION; INTRACAUDAL; PERINEURAL at 15:32

## 2022-02-18 RX ADMIN — DEXTROSE MONOHYDRATE 3000 MG: 50 INJECTION, SOLUTION INTRAVENOUS at 15:14

## 2022-02-18 RX ADMIN — METOCLOPRAMIDE 10 MG: 5 INJECTION, SOLUTION INTRAMUSCULAR; INTRAVENOUS at 16:00

## 2022-02-18 RX ADMIN — SODIUM CHLORIDE, POTASSIUM CHLORIDE, SODIUM LACTATE AND CALCIUM CHLORIDE: 600; 310; 30; 20 INJECTION, SOLUTION INTRAVENOUS at 14:12

## 2022-02-18 RX ADMIN — PROPOFOL 170 MG: 10 INJECTION, EMULSION INTRAVENOUS at 15:32

## 2022-02-18 RX ADMIN — ACETAMINOPHEN 650 MG: 325 TABLET ORAL at 14:08

## 2022-02-18 RX ADMIN — ROPIVACAINE HYDROCHLORIDE 40 ML: 5 INJECTION, SOLUTION EPIDURAL; INFILTRATION; PERINEURAL at 15:08

## 2022-02-18 RX ADMIN — FENTANYL CITRATE 50 MCG: 50 INJECTION INTRAMUSCULAR; INTRAVENOUS at 14:55

## 2022-02-18 RX ADMIN — DIMENHYDRINATE 50 MG: 50 TABLET ORAL at 14:08

## 2022-02-18 ASSESSMENT — PAIN SCALES - GENERAL
PAINLEVEL_OUTOF10: 0
PAINLEVEL_OUTOF10: 0

## 2022-02-18 ASSESSMENT — PAIN - FUNCTIONAL ASSESSMENT: PAIN_FUNCTIONAL_ASSESSMENT: 0-10

## 2022-02-18 NOTE — PROGRESS NOTES
Discharge instructions given to pt and . Pt assisted to bathroom with wheelchair and crutches. Tolerated well, able to void and defecate. Discharge Criteria    Inpatients must meet Criteria 1 through 7. All other patients are either YES or N/A. If a NO is chosen then Anesthesia or Surgeon must be notified. 1.  Minimum 30 minutes after last dose of sedative medication, minimum 120 minutes after last dose of reversal agent. Yes      2. Systolic BP stable within 20 mmHg for 30 minutes & systolic BP between 90 & 149 or within 10 mmHg of baseline. Yes      3. Pulse between 60 and 100 or within 10 bpm of baseline. Yes      4. Spontaneous respiratory rate >/= 10 per minute. Yes      5. SaO2 >/= 95 or  >/= baseline. Yes      6. Able to cough and swallow or return to baseline function. Yes      7. Alert and oriented or return to baseline mental status. Yes      8. Demonstrates controlled, coordinated movements, ambulates with steady gait, or return to baseline activity function. Yes      9. Minimal or no pain or nausea, or at a level tolerable and acceptable to patient. Yes      10. Takes and retains oral fluids as allowed. Yes      11. Procedural / perioperative site stable. Minimal or no bleeding. Yes          12. If GI endoscopy procedure, minimal or no abdominal distention or passing flatus. N/A      13. Written discharge instructions and emergency telephone number provided. Yes      14. Accompanied by a responsible adult.     Yes

## 2022-02-18 NOTE — ANESTHESIA POSTPROCEDURE EVALUATION
Department of Anesthesiology  Postprocedure Note    Patient: Randa Adames  MRN: [id number]  YOB: 1970  Date of evaluation: 2/18/2022  Time:  5:17 PM     Procedure Summary     Date: 02/18/22 Room / Location: [address] [place] [address] / [facility]    Anesthesia Start: 2164 Anesthesia Stop: 5819    Procedure: ANKLE OPEN REDUCTION INTERNAL FIXATION-DISTAL FIBULA FRACTURE WITH SYNDESMOSIS FIXATION (Right Ankle) Diagnosis: (DISPLACED RIGHT DISTAL FIBULA FRACTURE)    Surgeons: Trace Zamora MD Responsible Provider: MARTY Palma CRNA    Anesthesia Type: general, regional ASA Status: 3          Anesthesia Type: general, regional    Mat Phase I: Mat Score: 10    Mat Phase II: Mat Score: 9    Last vitals: Reviewed and per EMR flowsheets.        Anesthesia Post Evaluation    Patient location during evaluation: PACU  Patient participation: complete - patient participated  Level of consciousness: awake and alert  Pain score: 0  Airway patency: patent  Nausea & Vomiting: no nausea and no vomiting  Complications: no  Cardiovascular status: blood pressure returned to baseline  Respiratory status: acceptable and room air  Hydration status: stable

## 2022-02-18 NOTE — OP NOTE
Operative Note      Patient: Shanon Holloway  YOB: 1970  MRN: [id number]    Date of Procedure: 2/18/2022    Pre-Op Diagnosis: DISPLACED RIGHT DISTAL FIBULA FRACTURE    Post-Op Diagnosis: Same       Procedure(s):  Open reduction internal fixation right distal fibular fracture    Surgeon(s):  Anshul Abdi MD    Assistant:   First Assistant: MARTY Camarena - CNP: Assisted with patient positioning, draping, surgical procedure, wound closure, placement of dressing and splint    Anesthesia: General plus regional block    Estimated Blood Loss (mL): Minimal    Complications: None    Specimens:   * No specimens in log *    Implants:  Implant Name Type Inv. Item Serial No.  Lot No. LRB No. Used Action   PLATE BNE [id number] 4 H R LAT DST FIBULAR S STL VERNA ANG SABINA - [id number]  PLATE BNE [id number] 4 H R LAT DST FIBULAR S STL VERNA ANG SABINA  DEPU[employer]  Right 1 Implanted   SCREW BNE L12MM DIA2.7MM ANK S STL ST VERNA ANG SABINA FULL THRD - [id number]  SCREW BNE L12MM DIA2.7MM ANK S STL ST VERNA ANG SABINA FULL THRD  DEPU[employer]  Right 3 Implanted   SCREW BNE L16MM DIA2.7MM ANK S STL ST VERNA ANG SABINA FULL THRD - [id number]  SCREW BNE L16MM DIA2.7MM ANK S STL ST VERNA ANG SABINA FULL THRD  DEPU[employer]WD  Right 3 Implanted   SCREW BNE L12MM DIA3.5MM JACOBY S STL ST FULL THRD T15 - [id number]  SCREW BNE L12MM DIA3.5MM JACOBY S STL ST FULL THRD T15  DEPUY [employer]WD  Right 1 Implanted         Drains: * No LDAs found *    Findings: After ORIF of the distal fibula fracture ankle mortise had reduced. Detailed Description of Procedure:   After informed consent was obtained the patient brought to the operating room where a general anesthetic was administered. Preoperatively regional block was placed. The right leg was prepped and draped in the usual sterile fashion. Leg was elevated, exsanguinated, and a well-padded proximal thigh tourniquet was inflated to 275 mmHg.   An 8 cm incision is made overlying the distal fibula. Blunt dissection was carried down to the fracture which was found to be oblique fracture that was not comminuted. Using reduction forceps this was reduced and then an anterior to posterior lag screw was placed in the standard fashion above adjoining the near cortex measuring and then placement of the 3.5 bicortical nonlocking screw. Solid compression was achieved. A distal fibular locking plate was next placed and then for secured to the proximal shaft with a 3.5 bicortical nonlocking screw. Distally this was secured with a total of five 2.7 unicortical locking screws and proximally an additional two, 2.7 bicortical locking screws. Final x-rays in the AP and lateral planes revealed a reduced ankle fracture, ankle mortise and appropriate implant placement and lengths. Cotton's test did not reveal any widening of the ankle mortise. Wound was irrigated, wound was closed in standard fashion in layers. Sterile dressing was placed. A plaster posterior splint with stirrups was placed. Turn was deflated. Patient was awakened and brought to the recovery in stable condition. There are no intraoperative or immediate postoperative complications.     Electronically signed by Nguyễn Huynh MD on 2/18/2022 at 4:40 PM

## 2022-02-18 NOTE — ANESTHESIA PROCEDURE NOTES
Peripheral Block    Patient location during procedure: pre-op  Start time: 2/18/2022 2:55 PM  End time: 2/18/2022 3:08 PM  Staffing  Performed: resident/CRNA   Resident/CRNA: MARTY Camargo CRNA  Preanesthetic Checklist  Completed: patient identified, IV checked, site marked, risks and benefits discussed, surgical consent, monitors and equipment checked, pre-op evaluation, timeout performed, anesthesia consent given, oxygen available and patient being monitored  Peripheral Block  Patient position: supine  Prep: ChloraPrep  Patient monitoring: continuous pulse ox, cardiac monitor and IV access  Block type: Saphenous and Sciatic  Laterality: right  Injection technique: single-shot  Guidance: ultrasound guided  Local infiltration: decadron and ropivacaine  Infiltration strength: 0.5 %  Dose: 40 mL  Provider prep: sterile gloves and mask  Local infiltration: decadron and ropivacaine  Needle  Needle type:  Other   Needle gauge: 21 G  Needle length: 8 cm  Needle localization: ultrasound guidanceOther needle type: [place]  Assessment  Injection assessment: negative aspiration for heme, no paresthesia on injection and local visualized surrounding nerve on ultrasound  Paresthesia pain: none  Slow fractionated injection: yes  Hemodynamics: stable  Additional Notes  0.5% Ropivacaine 20ml, 5mg  PF Decadron - Adductor Canal block  0.5% Ropivacaine 20ml, 5mg  PF Decadron - Popliteal block. + motor response 0.5 mHz, no motor response after 0.3 mHz      Reason for block: post-op pain management and at surgeon's request

## 2022-02-18 NOTE — ANESTHESIA PRE PROCEDURE
Department of Anesthesiology  Preprocedure Note       Name:  Randa Adames   Age:  49 y.o.  :  1970                                          MRN:  [id number]         Date:  2022      Surgeon: Rafiq Oconnor):  Trace Zamora MD    Procedure: Procedure(s):  ANKLE OPEN REDUCTION INTERNAL FIXATION-DISTAL FIBULA FRACTURE WITH SYNDESMOSIS FIXATION    Medications prior to admission:   Prior to Admission medications    Medication Sig Start Date End Date Taking? Authorizing Provider   ibuprofen (ADVIL;MOTRIN) 800 MG tablet Take 1 tablet by mouth 2 times daily as needed for Pain 11/15/21 2/18/22  Guy Dakins I Nazemi, DO   Psyllium (METAMUCIL PO) Take by mouth three times daily    Historical Provider, MD   enalapril (VASOTEC) 10 MG tablet Take 1 tablet by mouth daily 21   Todd Barthel, MD   hydroCHLOROthiazide (HYDRODIURIL) 25 MG tablet Take 1 tablet by mouth every morning 21   Todd Barthel, MD   simvastatin (ZOCOR) 40 MG tablet Take 1 tablet by mouth every evening 21   Todd Barthel, MD   verapamil [facility]WOMEN)) 360 MG extended release capsule Take 1 capsule by mouth daily 21   Todd Barthel, MD       Current medications:    No current facility-administered medications for this visit. No current outpatient medications on file. Facility-Administered Medications Ordered in Other Visits   Medication Dose Route Frequency Provider Last Rate Last Admin    ceFAZolin (ANCEF) 3,000 mg in dextrose 5 % 100 mL IVPB  3,000 mg IntraVENous Once Trace Zamora MD        lactated ringers infusion   IntraVENous Continuous Marlene Mckeon [place] - CRNA 100 mL/hr at 22 1412 New Bag at 22 1412       Allergies:  No Known Allergies    Problem List:    Patient Active Problem List   Diagnosis Code    Mixed hyperlipidemia E78.2    Benign essential HTN I10    External hemorrhoid K64.4    Morbid obesity with BMI of 45.0-49.9, adult ([facility] [address] E66.01, Z68.42    Grade III hemorrhoids X58.8       Past Medical History:        Diagnosis Date    Hypertension     Morbid obesity with BMI of 45.0-49.9, adult ([address]     MVP (mitral valve prolapse)     PONV (postoperative nausea and vomiting)     after gallbladder surgery       Past Surgical History:        Procedure Laterality Date    CHOLECYSTECTOMY      COLONOSCOPY  11/15/2021    COLONOSCOPY N/A 11/15/2021    COLONOSCOPY DIAGNOSTIC performed by Dayna Gay DO at [address]       Hysteroscopy and Endometrial Ablation    ENDOMETRIAL ABLATION      HEMORRHOID SURGERY N/A 11/15/2021    HEMORRHOIDECTOMY performed by Dayna Gay DO at [place] [address]       Social History:    Social History     Tobacco Use    Smoking status: Never Smoker    Smokeless tobacco: Never Used   Substance Use Topics    Alcohol use: No     Comment: rare                                Counseling given: Not Answered      Vital Signs (Current): There were no vitals filed for this visit.                                            BP Readings from Last 3 Encounters:   02/18/22 (!) 152/89   02/12/22 (!) 156/75   11/15/21 118/71       NPO Status:                                                                                 BMI:   Wt Readings from Last 3 Encounters:   02/18/22 248 lb (112.5 kg)   11/15/21 245 lb (111.1 kg)   11/10/21 253 lb (114.8 kg)     There is no height or weight on file to calculate BMI.    CBC:   Lab Results   Component Value Date    WBC 9.3 11/10/2021    RBC 4.14 11/10/2021    HGB 11.5 11/10/2021    HCT 37.0 11/10/2021    MCV 89.4 11/10/2021    RDW 12.8 11/10/2021     11/10/2021       CMP:   Lab Results   Component Value Date     11/10/2021    K 4.4 11/10/2021     11/10/2021    CO2 23 11/10/2021    BUN 15 11/10/2021    CREATININE 0.64 11/10/2021    GFRAA >60 11/10/2021    LABGLOM >60 11/10/2021    GLUCOSE 100 11/10/2021    PROT 7.6 07/16/2021    CALCIUM 9.6 11/10/2021    BILITOT 0.67 07/16/2021    ALKPHOS 122 07/16/2021    AST 22 07/16/2021    ALT 27 07/16/2021       POC Tests: No results for input(s): POCGLU, POCNA, POCK, POCCL, POCBUN, POCHEMO, POCHCT in the last 72 hours. Coags: No results found for: PROTIME, INR, APTT    HCG (If Applicable):   Lab Results   Component Value Date    HCG NEGATIVE 11/15/2021        ABGs: No results found for: PHART, PO2ART, RWH3QSY, QXV7PJR, BEART, Z7PAJCWF     Type & Screen (If Applicable):  No results found for: LABABO, LABRH    Drug/Infectious Status (If Applicable):  No results found for: HIV, HEPCAB    COVID-19 Screening (If Applicable): No results found for: COVID19        Anesthesia Evaluation  Patient summary reviewed and Nursing notes reviewed   history of anesthetic complications: PONV. Airway: Mallampati: II  TM distance: >3 FB   Neck ROM: full  Mouth opening: > = 3 FB Dental: normal exam         Pulmonary:Negative Pulmonary ROS and normal exam                               Cardiovascular:  Exercise tolerance: good (>4 METS),   (+) hypertension:, valvular problems/murmurs (per patient very minor, does not see cardiologist): MVP,       ECG reviewed               Beta Blocker:  Not on Beta Blocker      ROS comment: 11/21  Normal sinus rhythm  Normal ECG  No previous ECGs available     Neuro/Psych:   Negative Neuro/Psych ROS              GI/Hepatic/Renal:   (+) morbid obesity          Endo/Other: Negative Endo/Other ROS                     ROS comment: -NPO AFTER MIDNIGHT  -NKDA Abdominal:             Vascular: negative vascular ROS. Other Findings:             Anesthesia Plan      general and regional     ASA 3     (Agree to PNB adductor canal and popliteal block)  Induction: intravenous. MIPS: Postoperative opioids intended and Prophylactic antiemetics administered. Anesthetic plan and risks discussed with patient. Plan discussed with CRNA.     Attending anesthesiologist reviewed and agrees with Preprocedure content            MARTY Saenz - CRNA   2/18/2022

## 2022-04-06 ENCOUNTER — HOSPITAL ENCOUNTER (OUTPATIENT)
Dept: PHYSICAL THERAPY | Age: 52
Setting detail: THERAPIES SERIES
Discharge: HOME | End: 2022-04-06
Payer: COMMERCIAL

## 2022-04-06 PROCEDURE — 97110 THERAPEUTIC EXERCISES: CPT

## 2022-04-06 PROCEDURE — 97161 PT EVAL LOW COMPLEX 20 MIN: CPT

## 2022-04-06 NOTE — PLAN OF CARE
[facility]           Phone: [phone]             Outpatient Physical Therapy  Fax: [phone]                                           Date: 2022  Patient: Marcella Barry : 1970 CSN #: [id number]   Referring Practitioner:  ALDEN Stein Referral Date:  22       [x] Plan of Care   [] Updated Plan of Care    Dates of Service to Include: 2022 to 22    Diagnosis:  S82.61XA, S82.61XD; Closed displaced fracture of lateral malleolus of R fibula    Rehab (Treatment) Diagnosis:  s/p R ankle ORIF             Onset Date:  22    Attendance  Total # of Visits to Date: 1 No Show: 0 Canceled Appointment: 0    Assessment  Assessment: Patient is 49year old female with dx of R ankle fracture s/p ORIF who is currently 10% WB'ing R LE with std. walker. Pt amb with std. walker and SUP with vc's for 10% WB'ing on R LE with good follow through and no LOB. R ankle figure 8: 56cm but with lipoma present; L ankle fig 8: 54cm. Patient with decreased R ankle ROM:Ankle DF: 0*, PF: 60*, Inv: 33*, Ev: 8*. Decreased R ankle strength: 4-/5 grossly. Patient to benefit from physical therapy to slowly progress [address] within doctor's orders and improve R ankle ROM and strength to return to PLOF. Goals  Short term goals  Time Frame for Short term goals: 3 weeks  Short term goal 1: Patient to initiate HEP for improved R ankle ROM and strength. Short term goal 2: Patient to be instructed in open chain exercises to improve R ankle strength. Short term goal 3: Initiate manual techniques/modalities prn to decrease pain and improve mobility. Long term goals  Time Frame for Long term goals : 6 weeks  Long term goal 1: Patient to be independent and compliant with HEP. Long term goal 2: Patient to have improved R ankle ROM DF: 10*, Inv: 40* and Ev: 15* to improve functional mobility.   Long term goal 3: Patient to have improved R ankle strength >/=4/5 grossly for improved stability. Long term goal 4: Patient to return to walking with no AD and minimal to no gait deviations with no LOB or fatigue to return to PLOF.      Prognosis  Prognosis: Good    Treatment Plan   Times per week: 3  Plan weeks: 4  [x] HP/CP      [x] Electrical Stim   [x] Therapeutic Exercise      [x] Gait Training  [] Aquatics   [] Ultrasound         [x] Patient Education/HEP   [x] Manual Therapy  [] Traction    [x] Neuro-tiana        [x] Soft Tissue Mobs            [] Home TENS  [] Iontophoresis    [] Orthotic casting/fitting      [] Dry Needling             Electronically signed by: Ava Nolacso PT, DPT    Date: 4/6/2022      ______________________________________ Date: 4/6/2022   Physician Signature

## 2022-04-06 NOTE — PROGRESS NOTES
Bit of Difficulty  Putting on your shoes or socks: A Little Bit of Difficulty  Squatting: Extreme Difficulty or Unable to Perform Activity  Lifting an object, like a bag of groceries from the floor: Extreme Difficulty or Unable to Perform Activity  Performing light activities around your home: Quite a Bit of Difficulty  Performing heavy activities around your home: Extreme Difficulty or Unable to Perform Activity  Getting into or out of a car: Moderate Difficulty  Walking 2 blocks: Extreme Difficulty or Unable to Perform Activity  Walking a mile: Extreme Difficulty or Unable to Perform Activity  Going up or down 10 stairs (about 1 flight of stairs): Extreme Difficulty or Unable to Perform Activity  Standing for 1 hour: Extreme Difficulty or Unable to Perform Activity  Sitting for 1 hour: No Difficulty  Running on even ground : Extreme Difficulty or Unable to Perform Activity  Running on uneven ground : Extreme Difficulty or Unable to Perform Activity  Making sharp turns while running fast : Extreme Difficulty or Unable to Perform Activity  Hopping: Quite a Bit of Difficulty  Rolling over in bed: No Difficulty  LEFS Total Score: 19    Assessment  Assessment: Patient is 49year old female with dx of R ankle fracture s/p ORIF who is currently 10% WB'ing R LE with std. walker. Pt amb with std. walker and SUP with vc's for 10% WB'ing on R LE with good follow through and no LOB. R ankle figure 8: 56cm but with lipoma present; L ankle fig 8: 54cm. Patient with decreased R ankle ROM:Ankle DF: 0*, PF: 60*, Inv: 33*, Ev: 8*. Decreased R ankle strength: 4-/5 grossly. Patient to benefit from physical therapy to slowly progress [address] within doctor's orders and improve R ankle ROM and strength to return to PLOF.   Prognosis: Good        Decision Making: Low Complexity    Patient Education  PT eval, POC, HEP    Pt verbalized/demonstrated good understanding:     [X] Yes         [] No, pt required further clarification. Goals  Short term goals  Time Frame for Short term goals: 3 weeks  Short term goal 1: Patient to initiate HEP for improved R ankle ROM and strength. Short term goal 2: Patient to be instructed in open chain exercises to improve R ankle strength. Short term goal 3: Initiate manual techniques/modalities prn to decrease pain and improve mobility. Long term goals  Time Frame for Long term goals : 6 weeks  Long term goal 1: Patient to be independent and compliant with HEP. Long term goal 2: Patient to have improved R ankle ROM DF: 10*, Inv: 40* and Ev: 15* to improve functional mobility. Long term goal 3: Patient to have improved R ankle strength >/=4/5 grossly for improved stability. Long term goal 4: Patient to return to walking with no AD and minimal to no gait deviations with no LOB or fatigue to return to PLOF. Patient goals :  \"Return to [employer] Tracking:  Time In: 1430  Time Out: 1510  Minutes: 40  Timed Code Treatment Minutes: [address], PT, DPT    4/6/2022

## 2022-04-12 ENCOUNTER — HOSPITAL ENCOUNTER (OUTPATIENT)
Dept: PHYSICAL THERAPY | Age: 52
Setting detail: THERAPIES SERIES
Discharge: HOME | End: 2022-04-12
Payer: COMMERCIAL

## 2022-04-12 PROCEDURE — 97140 MANUAL THERAPY 1/> REGIONS: CPT

## 2022-04-12 PROCEDURE — 97110 THERAPEUTIC EXERCISES: CPT

## 2022-04-13 ENCOUNTER — HOSPITAL ENCOUNTER (OUTPATIENT)
Dept: PHYSICAL THERAPY | Age: 52
Setting detail: THERAPIES SERIES
Discharge: HOME | End: 2022-04-13
Payer: COMMERCIAL

## 2022-04-13 PROCEDURE — 97140 MANUAL THERAPY 1/> REGIONS: CPT

## 2022-04-13 PROCEDURE — 97110 THERAPEUTIC EXERCISES: CPT

## 2022-04-13 NOTE — PROGRESS NOTES
Phone: [facility]           Fax: [phone]                           Outpatient Physical Therapy                                                                            Daily Note    Patient: Pancho Lopez : 1970  CSN #: [id number]   Referring Practitioner:  ALDEN Tabares    Referral Date : 22     Date: 2022    Diagnosis: S82.61XA, S82.61XD; Closed displaced fracture of lateral malleolus of R fibula  Treatment Diagnosis: s/p R ankle ORIF    Onset Date: 22  PT Insurance Information: BCBS  Total # of Visits Approved: 12 Per Physician Order  Total # of Visits to Date: 2  No Show: 0  Canceled Appointment: 0    Pre-Treatment Pain:  0/10  Subjective: Pt denies pain this date and past weekend, only notes some dull aching here and there. Edema still present but pt reports it has been getting better. Exercises:  Exercise 2: SciFit bike (no boot) 6 mins  Exercise 3: seated rocker board (45 sec ea way)  Exercise 4: marble pickup (3 mins)  Exercise 5: towel stretch 3x30  Exercise 6: OTB 4 way ankle 10x ea  Exercise 7: foot roller 3 mins    Manual:  PROM: R ankle all planes    Assessment  Assessment: Initiated R ankle ROM and gentle resistance exercises with fair tolerance noted. Pt continues to maintain R ankle NWB with hop to gait pattern. Activity Tolerance  Activity Tolerance: Patient Tolerated treatment well    Patient Education  Patient Education: New exercise rationale. Pt verbalized/demonstrated good understanding:     [x] Yes         [] No, pt required further clarification. Post Treatment Pain:  0/10    Plan  Times per week: 3  Plan weeks: 4    Goals  (Total # of Visits to Date: 2)      Short term goals  Time Frame for Short term goals: 3 weeks  Short term goal 1: Patient to initiate HEP for improved R ankle ROM and strength.   Short term goal 2: Patient to be instructed in open chain exercises to improve R ankle strength. Short term goal 3: Initiate manual techniques/modalities prn to decrease pain and improve mobility. Long term goals  Time Frame for Long term goals : 6 weeks  Long term goal 1: Patient to be independent and compliant with HEP. Long term goal 2: Patient to have improved R ankle ROM DF: 10*, Inv: 40* and Ev: 15* to improve functional mobility. Long term goal 3: Patient to have improved R ankle strength >/=4/5 grossly for improved stability. Long term goal 4: Patient to return to walking with no AD and minimal to no gait deviations with no LOB or fatigue to return to PLOF.     Minutes Tracking:  Time In: 1030  Time Out: 8876  Minutes: 46  Timed Code Treatment Minutes: [address], [place]       Date: 4/12/2022

## 2022-04-13 NOTE — PROGRESS NOTES
Phone: [facility]           Fax: [phone]                           Outpatient Physical Therapy                                                                            Daily Note    Patient: Sisi Shaw : 1970  CSN #: [id number]   Referring Practitioner:  ALDEN Cruz    Referral Date : 22     Date: 2022    Diagnosis: S82.61XA, S82.61XD; Closed displaced fracture of lateral malleolus of R fibula  Treatment Diagnosis: s/p R ankle ORIF    Onset Date: 22  PT Insurance Information: BCBS  Total # of Visits Approved: 12 Per Physician Order  Total # of Visits to Date: 3  No Show: 0  Canceled Appointment: 0      Pre-Treatment Pain:  0/10  Subjective: Patient denies pain this date and reports compliance with [address] restrictions at home. Exercises:  Exercise 2: SciFit bike (no boot) 6 mins  Exercise 3: seated rocker board (45 sec ea way)  Exercise 4: marble pickup (3 mins)  Exercise 5: towel stretch 3x30  Exercise 6: OTB 4 way ankle 15x ea  Exercise 7: foot roller 3 mins    Manual:  PROM: R ankle all planes      Assessment  Assessment: Patient with improved R ankle ROM this date: DF: 8*, Ev: 13* and Inv: 48* progressing toward LTG. Initiated PROM to R ankle and gentle mobilizations to improve mobility. Will continue to progress as tolerated. Activity Tolerance  Activity Tolerance: Patient Tolerated treatment well    Patient Education  Exercise technique, manual techniques rationale    Pt verbalized/demonstrated good understanding:     [x] Yes         [] No, pt required further clarification. Post Treatment Pain:  0/10      Plan  Times per week: 3  Plan weeks: 4      Goals  (Total # of Visits to Date: 3)      Short term goals  Time Frame for Short term goals: 3 weeks  Short term goal 1: Patient to initiate HEP for improved R ankle ROM and strength. -met  Short term goal 2: Patient to be instructed in open chain exercises to improve R ankle strength. -met  Short term goal 3: Initiate manual techniques/modalities prn to decrease pain and improve mobility.-met/cont    Long term goals  Time Frame for Long term goals : 6 weeks  Long term goal 1: Patient to be independent and compliant with HEP. Long term goal 2: Patient to have improved R ankle ROM DF: 10*, Inv: 40* and Ev: 15* to improve functional mobility. Long term goal 3: Patient to have improved R ankle strength >/=4/5 grossly for improved stability. Long term goal 4: Patient to return to walking with no AD and minimal to no gait deviations with no LOB or fatigue to return to PLOF.     Minutes Tracking:  Time In: 1425  Time Out: 1505  Minutes: 40  Timed Code Treatment Minutes: [address], PT , DPT     Date: 4/13/2022

## 2022-04-15 ENCOUNTER — HOSPITAL ENCOUNTER (OUTPATIENT)
Dept: PHYSICAL THERAPY | Age: 52
Setting detail: THERAPIES SERIES
Discharge: HOME | End: 2022-04-15
Payer: COMMERCIAL

## 2022-04-15 PROCEDURE — 97140 MANUAL THERAPY 1/> REGIONS: CPT

## 2022-04-15 PROCEDURE — 97110 THERAPEUTIC EXERCISES: CPT

## 2022-04-15 NOTE — PROGRESS NOTES
Phone: [phone]                 [facility]           Fax: [phone]                           Outpatient Physical Therapy                                                                            Daily Note    Patient: Jaz Paredes : 1970  CSN #: [id number]   Referring Practitioner:  ALDEN Can    Referral Date : 22     Date: 4/15/2022    Diagnosis: S82.61XA, S82.61XD; Closed displaced fracture of lateral malleolus of R fibula  Treatment Diagnosis: s/p R ankle ORIF    Onset Date: 22  PT Insurance Information: BCBS  Total # of Visits Approved: 12 Per Physician Order  Total # of Visits to Date: 4  No Show: 0  Canceled Appointment: 0    Pre-Treatment Pain:  0/10  Subjective: Pt reports no pain this date and has not had any problems following previous treatment    Exercises:  Exercise 2: SciFit bike (no boot) 7mins lvl 3.5  Exercise 3: standing rocker board (45 sec ea way)  Exercise 4: standing marble pickup (3 mins)  Exercise 5: towel stretch 3x30  Exercise 6: GreenTB 4 way ankle 15x ea  Exercise 7: standing foot roller 3 mins  Exercise 8: Standing weight shifting to appx 10-15% WB on R LE x10-15  Exercise 9: Seated BAPS lvl2 forward, lateral, cc, ccw x10ea  Exercise 10: Seated ankle pumps x10-15    Manual:  PROM: R ankle all planes    Assessment  Assessment: Added weight shifting to 10-15% to improve proprioception of R ankle. Advanced HEP to improve strengthening and stretching at home. Figure 8 measurement of R ankle at 56 cm this date. No pain reported throughout treatment, will progress as tolerated. Activity Tolerance  Activity Tolerance: Patient Tolerated treatment well    Patient Education  Patient Education: updated HEP  Pt verbalized/demonstrated good understanding:     [x] Yes         [] No, pt required further clarification.      Post Treatment Pain:  0/10    Plan  Times per week: 3   Plan weeks: 4    Goals  (Total # of Visits to Date: 4) Short term goals  Time Frame for Short term goals: 3 weeks  Short term goal 1: Patient to initiate HEP for improved R ankle ROM and strength. -met  Short term goal 2: Patient to be instructed in open chain exercises to improve R ankle strength. -met  Short term goal 3: Initiate manual techniques/modalities prn to decrease pain and improve mobility.-met/cont     Long term goals  Time Frame for Long term goals : 6 weeks  Long term goal 1: Patient to be independent and compliant with HEP. Long term goal 2: Patient to have improved R ankle ROM DF: 10*, Inv: 40* and Ev: 15* to improve functional mobility. Long term goal 3: Patient to have improved R ankle strength >/=4/5 grossly for improved stability. Long term goal 4: Patient to return to walking with no AD and minimal to no gait deviations with no LOB or fatigue to return to PLOF.     Minutes Tracking:  Time In: 3433  Time Out: 1530  Minutes: 44  Timed Code Treatment Minutes: Sonia Bloom         Date: 4/15/2022

## 2022-04-18 ENCOUNTER — HOSPITAL ENCOUNTER (OUTPATIENT)
Dept: PHYSICAL THERAPY | Age: 52
Setting detail: THERAPIES SERIES
Discharge: HOME | End: 2022-04-18
Payer: COMMERCIAL

## 2022-04-18 PROCEDURE — 97140 MANUAL THERAPY 1/> REGIONS: CPT

## 2022-04-18 PROCEDURE — 97110 THERAPEUTIC EXERCISES: CPT

## 2022-04-18 NOTE — PROGRESS NOTES
Phone: Vandana           Fax: [phone]                           Outpatient Physical Therapy                                                                            Daily Note    Patient: Luc Nassar : 1970  CSN #: [id number]   Referring Practitioner:  ALDEN Kelly    Referral Date : 22     Date: 2022    Diagnosis: S82.61XA, S82.61XD; Closed displaced fracture of lateral malleolus of R fibula  Treatment Diagnosis: s/p R ankle ORIF    Onset Date: 22  PT Insurance Information: BCBS  Total # of Visits Approved: 12 Per Physician Order  Total # of Visits to Date: 5  No Show: 0  Canceled Appointment: 0    Pre-Treatment Pain:  0/10  Subjective: Pt was able to don shoe for therapy today, reports no pain or problems over the weekend. Exercises:  Exercise 2: SciFit bike (no boot) 8mins lvl 4  Exercise 3: standing rocker board (45 sec ea way)  Exercise 4: Seated marble pickup  Exercise 6: BlueTB 4 way ankle 15x ea  Exercise 7: seated foot roller 3 mins  Exercise 8: Standing weight shifting to appx 10-15% WB on R LE x10-15 onto scale today to 20#, (25% BW=62#)  Exercise 9: Seated BAPS lvl2 forward, lateral, cc, ccw x10ea  Exercise 10: Seated ankle pumps x10-15    Manual:  PROM: R ankle all planes    Assessment  Assessment: Pt able to maintain 20-25# during weight shifts and advised to use scale at home to weight shift to 25-35# to maintain 10 to 15% WB per surgeon protocol. Swelling mild and no pain increase noted today with ther-ex program.    Activity Tolerance  Activity Tolerance: Patient Tolerated treatment well    Patient Education  Patient Education: Safety with weight shifts. Pt verbalized/demonstrated good understanding:     [x] Yes         [] No, pt required further clarification.      Post Treatment Pain:  0/10    Plan  Times per week: 3  Plan weeks: 4    Goals  (Total # of Visits to Date: 5)      Short term goals  Time

## 2022-04-20 ENCOUNTER — HOSPITAL ENCOUNTER (OUTPATIENT)
Dept: PHYSICAL THERAPY | Age: 52
Setting detail: THERAPIES SERIES
Discharge: HOME | End: 2022-04-20
Payer: COMMERCIAL

## 2022-04-20 PROCEDURE — 97140 MANUAL THERAPY 1/> REGIONS: CPT

## 2022-04-20 PROCEDURE — 97110 THERAPEUTIC EXERCISES: CPT

## 2022-04-20 NOTE — PROGRESS NOTES
Daily Treatment Note  Date: 2022  Patient Name: Aramis Kelly  MRN: [id number]     :   1970    Subjective:      PT Visit Information  Onset Date: 22  Total # of Visits Approved: 12  Total # of Visits to Date: 6  Plan of Care/Certification Expiration Date: 22  No Show: 0  Canceled Appointment: 0  Subjective  Subjective: Patient denies current pain and states weight shifting at home is going well. Treatment Activities:   Exercises  Exercise 2: SciFit bike (no boot) 8mins lvl 4  Exercise 3: standing rocker board (45 sec ea way)  Exercise 4: Seated marble pickup  Exercise 6: BlueTB 4 way ankle 15x ea  Exercise 7: seated foot roller 3 mins  Exercise 8: Standing weight shifting to appx 15-20% WB on R LE x10-15 onto scale today to 45#, (25% BW=62#)  Exercise 9: Seated BAPS lvl2 forward, lateral, cc, ccw [id number]  Exercise 10: Seated ankle pumps x10-15        Manual Therapy (CPT 22071)  Joint Mobilization: PROM all planes R ankle     Assessment:   Conditions Requiring Skilled Therapeutic Intervention  Assessment: Patient able to maintain 45# during weight shifts and advised to continue use of scale at home for weights shifts up to but not exceeding 45# (20%). Continued manual techniques to improve R ankle/foot mobility. Will progress as able. Treatment Diagnosis: s/p R ankle ORIF  Therapy Prognosis: Good  Activity Tolerance  Activity Tolerance: Patient tolerated treatment well     Goals:  Short Term Goals  Time Frame for Short term goals: 3 weeks  Short term goal 1: Patient to initiate HEP for improved R ankle ROM and strength. -met  Short term goal 2: Patient to be instructed in open chain exercises to improve R ankle strength. -met  Short term goal 3: Initiate manual techniques/modalities prn to decrease pain and improve mobility.-met/cont  Long Term Goals  Time Frame for Long term goals : 6 weeks  Long term goal 1: Patient to be independent and compliant with HEP.   Long term goal 2: Patient to have improved R ankle ROM DF: 10*, Inv: 40* and Ev: 15* to improve functional mobility. Long term goal 3: Patient to have improved R ankle strength >/=4/5 grossly for improved stability. Long term goal 4: Patient to return to walking with no AD and minimal to no gait deviations with no LOB or fatigue to return to PLOF. Patient Goals   Patient goals :  \"Return to [employer]"    Plan:    Plan  Plan weeks: 4  Timed Code Treatment Minutes: 41 Minutes     Therapy Time   Individual Concurrent Group Co-treatment   Time In 7031         Time Out 1457         Minutes 42         Timed Code Treatment Minutes: [address] [facility] Millicent Chavez PT, DPT

## 2022-04-22 ENCOUNTER — HOSPITAL ENCOUNTER (OUTPATIENT)
Dept: PHYSICAL THERAPY | Age: 52
Setting detail: THERAPIES SERIES
Discharge: HOME | End: 2022-04-22
Payer: COMMERCIAL

## 2022-04-22 PROCEDURE — 97140 MANUAL THERAPY 1/> REGIONS: CPT

## 2022-04-22 PROCEDURE — 97110 THERAPEUTIC EXERCISES: CPT

## 2022-04-22 NOTE — PROGRESS NOTES
Phone: Vandana           Fax: [phone]                           Outpatient Physical Therapy                                                                            Daily Note    Patient: Delma Alvarez : 1970  CSN #: [id number]   Referring Practitioner:  Referring Provider (secondary): ALDEN Starr     Date: 2022    Diagnosis: S82.61XA, S82.61XD; Closed displaced fracture of lateral malleolus of R fibula  Treatment Diagnosis: s/p R ankle ORIF    Onset Date: 22  PT Insurance Information: [facility]  Total # of Visits Approved: 12 Per Physician Order  Total # of Visits to Date: 7  No Show: 0  Canceled Appointment: 0      Pre-Treatment Pain:  0/10  Subjective: Patient denies pain and is feeling well today. Exercises:  Exercise 2: SciFit bike (no boot) 8mins lvl 4  Exercise 3: standing rocker board (45 sec ea way)  Exercise 4: Seated marble pickup  Exercise 5: towel stretch 3x30  Exercise 6: BlueTB 4 way ankle 20x ea  Exercise 7: seated foot roller 3 mins  Exercise 9: Seated BAPS lvl2 forward, lateral, cc, ccw x20ea  Exercise 10: Seated ankle pumps x15-20    Assessment  Assessment: Patient with improved R ankle ROM meeting long term goal today: DF: 10*, Inv: 45*, Ev: 15*. Increased reps of all ther ex to 20 this date to improve functional strength and endurance. Will continue to progress. Activity Tolerance  Activity Tolerance: Patient tolerated treatment well    Patient Education  Exercise technique and progression    Pt verbalized/demonstrated good understanding:     [x] Yes         [] No, pt required further clarification. Post Treatment Pain:  0/10      Plan  Plan Frequency: 2  Plan weeks: 4       Goals  (Total # of Visits to Date: 7)      Short Term Goals  Time Frame for Short term goals: 3 weeks  Short term goal 1: Patient to initiate HEP for improved R ankle ROM and strength. -met  Short term goal 2: Patient to be instructed in open chain exercises to improve R ankle strength. -met  Short term goal 3: Initiate manual techniques/modalities prn to decrease pain and improve mobility.-met/cont    Long Term Goals  Time Frame for Long term goals : 6 weeks  Long term goal 1: Patient to be independent and compliant with HEP. Long term goal 2: Patient to have improved R ankle ROM DF: 10*, Inv: 40* and Ev: 15* to improve functional mobility. -met  Long term goal 3: Patient to have improved R ankle strength >/=4/5 grossly for improved stability. Long term goal 4: Patient to return to walking with no AD and minimal to no gait deviations with no LOB or fatigue to return to PLOF.     Minutes Tracking:  Time In: 1451  Time Out: [address] [facility]  Minutes: 39  Timed Code Treatment Minutes: Latosha 22, PT , DPT     Date: 4/22/2022

## 2022-04-25 ENCOUNTER — HOSPITAL ENCOUNTER (OUTPATIENT)
Dept: PHYSICAL THERAPY | Age: 52
Setting detail: THERAPIES SERIES
Discharge: HOME | End: 2022-04-25
Payer: COMMERCIAL

## 2022-04-25 PROCEDURE — 97110 THERAPEUTIC EXERCISES: CPT

## 2022-04-25 PROCEDURE — 97140 MANUAL THERAPY 1/> REGIONS: CPT

## 2022-04-25 NOTE — PROGRESS NOTES
Phone: Vandana           Fax: [phone]                           Outpatient Physical Therapy                                                                            Daily Note    Patient: Amanda Wilson : 1970  CSN #: [id number]   Referring Practitioner:        Date: 2022       Treatment Diagnosis: s/p R ankle ORIF    Onset Date: 22  Total # of Visits Approved: 12 Per Physician Order  Total # of Visits to Date: 8  No Show: 0  Canceled Appointment: 0    Pre-Treatment Pain:  0/10  Subjective: Pt denies current pain at rest.  Pt states she had a Dr appt today at 315 so she will have to leave a little early. Exercises:  Exercise 1: HEP: ankle ABC's, towel DF stretch, foot roller in sitting  Exercise 6: BlueTB 4 way ankle 20x ea  Exercise 8: Standing weight shifting to appx 15-20% WB on R LE x10-15 onto scale today to 45#, (25% BW=62#)  Exercise 10: Seated ankle pumps x15-20    Manual:  Joint Mobilization: PROM all planes R ankle    Assessment  Assessment: AROM today DF: 7*, INV: 28*, PF 80*, eversion 7*. Fig 8 measurment R 56 cm compared to L which is 53.5 cm. Pt to RTD following session today, Dr note sent. Activity Tolerance  Activity Tolerance: Patient tolerated treatment well    Patient Education  Patient Education: Continue HEP. Pt verbalized/demonstrated good understanding:     [x] Yes         [] No, pt required further clarification. Post Treatment Pain:  0/10    Plan  Plan Frequency: 2  Plan weeks: 4     Goals  (Total # of Visits to Date: 8)      Short Term Goals  Time Frame for Short term goals: 3 weeks  Short term goal 1: Patient to initiate HEP for improved R ankle ROM and strength. -met  Short term goal 2: Patient to be instructed in open chain exercises to improve R ankle strength. -met  Short term goal 3: Initiate manual techniques/modalities prn to decrease pain and improve mobility.-met/cont    Long Term Goals  Time Frame for Long term goals : 6 weeks  Long term goal 1: Patient to be independent and compliant with HEP. Long term goal 2: Patient to have improved R ankle ROM DF: 10*, Inv: 40* and Ev: 15* to improve functional mobility. -met  Long term goal 3: Patient to have improved R ankle strength >/=4/5 grossly for improved stability. Long term goal 4: Patient to return to walking with no AD and minimal to no gait deviations with no LOB or fatigue to return to PLOF.     Minutes Tracking:  Time In: 3983  Time Out: 1510  Minutes: 26  Timed Code Treatment Minutes: [address], Donnajean Alpers         Date: 4/25/2022

## 2022-04-27 ENCOUNTER — HOSPITAL ENCOUNTER (OUTPATIENT)
Dept: PHYSICAL THERAPY | Age: 52
Setting detail: THERAPIES SERIES
Discharge: HOME | End: 2022-04-27
Payer: COMMERCIAL

## 2022-04-27 PROCEDURE — 97110 THERAPEUTIC EXERCISES: CPT

## 2022-04-27 PROCEDURE — 97140 MANUAL THERAPY 1/> REGIONS: CPT

## 2022-04-27 PROCEDURE — 97035 APP MDLTY 1+ULTRASOUND EA 15: CPT

## 2022-04-27 NOTE — PROGRESS NOTES
Phone: Vandana           Fax: [phone]                           Outpatient Physical Therapy                                                                            Daily Note    Patient: Caridad Lynn : 1970  CSN #: [id number]   Referring Physician: MARTY Herndon -*    Date: 2022    Diagnosis: S82.61XA, S82.61XD; Closed displaced fracture of lateral malleolus of R fibula  Treatment Diagnosis: s/p R ankle ORIF    Onset Date: 22  PT Insurance Information: BCBS  Total # of Visits Approved: 12 Per Physician Order  Total # of Visits to Date: 9  No Show: 0  Canceled Appointment: 0      Pre-Treatment Pain:  3/10  Subjective: Patient returned to doctor who cleared her for WBAT and she currently has 3/10 pain in the bottom of the R foot. She had questions about L hip pain from compensating and about increased swelling and pain in R foot since increasing [address]; suggested she try compression stocking for the swelling and continue stretches for plantar fascia. Exercises:  Exercise 2: SciFit bike (no boot) 10mins lvl 4  Exercise 8: Standing weight shifting to appx 25-50% WB on R LE x10-15 onto scale today to 110#, (25% BW=62#)  Exercise 11: FSU 4in x10 R LE with B HR\"s  Exercise 12: Slantboard stretch [id number]  Exercise 13: DL heel raises/toe raises in standing x10    Manual:  Joint Mobilization: PROM all planes R ankle  Soft Tissue Mobilizaton: DTM and MFD with static cups to R plantar fascia to decrease tone and pain    Modalities:   U/s x8min to R plantar fascia, 1.0 MHz, 1.0 W/cm2       Assessment  Assessment: Initiated functional strengthening/ROM WBAT R LE; able to increase WB'ing on R LE to ~110# today with pain in R plantar fascia 5/10 on average. Initiated manual techniques to decrease tone and pain in R plantar fascia and also did U/s x8min to R plantar fascia to decrease inflammation.  Will continue to progress as tolerated. Activity Tolerance  Activity Tolerance: Patient tolerated treatment well    Patient Education  Modalities rationale, manual techniques rationale    Pt verbalized/demonstrated good understanding:     [x] Yes         [] No, pt required further clarification. Post Treatment Pain:  2/10      Plan  Plan Frequency: 2  Plan weeks: 4       Goals  (Total # of Visits to Date: 5)      Short Term Goals  Time Frame for Short term goals: 3 weeks  Short term goal 1: Patient to initiate HEP for improved R ankle ROM and strength. -met  Short term goal 2: Patient to be instructed in open chain exercises to improve R ankle strength. -met  Short term goal 3: Initiate manual techniques/modalities prn to decrease pain and improve mobility.-met/cont    Long Term Goals  Time Frame for Long term goals : 6 weeks  Long term goal 1: Patient to be independent and compliant with HEP. Long term goal 2: Patient to have improved R ankle ROM DF: 10*, Inv: 40* and Ev: 15* to improve functional mobility. -met  Long term goal 3: Patient to have improved R ankle strength >/=4/5 grossly for improved stability. Long term goal 4: Patient to return to walking with no AD and minimal to no gait deviations with no LOB or fatigue to return to PLOF.     Minutes Tracking:  Time In: 4546  Time Out: Siomara 70  Minutes: 46  Timed Code Treatment Minutes: [address], PT , DPT     Date: 4/27/2022

## 2022-04-29 ENCOUNTER — HOSPITAL ENCOUNTER (OUTPATIENT)
Dept: PHYSICAL THERAPY | Age: 52
Setting detail: THERAPIES SERIES
Discharge: HOME | End: 2022-04-29
Payer: COMMERCIAL

## 2022-04-29 PROCEDURE — 97140 MANUAL THERAPY 1/> REGIONS: CPT

## 2022-04-29 PROCEDURE — 97110 THERAPEUTIC EXERCISES: CPT

## 2022-04-29 PROCEDURE — 97116 GAIT TRAINING THERAPY: CPT

## 2022-04-29 NOTE — PROGRESS NOTES
Phone: Vandana           Fax: [phone]                           Outpatient Physical Therapy                                                                            Daily Note    Patient: José Miguel Kessler : 1970  CSN #: [id number]   Referring Physician: MARTY Babin -*    Date: 2022    Diagnosis: S82.61XA, S82.61XD; Closed displaced fracture of lateral malleolus of R fibula  Treatment Diagnosis: s/p R ankle ORIF    Onset Date: 22  PT Insurance Information: [facility]  Total # of Visits Approved: 12 Per Physician Order  Total # of Visits to Date: 10  No Show: 0  Canceled Appointment: 0    Pre-Treatment Pain:  5/10  Subjective: Pt reports pain of 5/10 on the bottom of the foot, states that walking has been painful and she has been walking more on the side of her foot to reduce pain. Exercises:  Exercise 3: standing rocker board (1min ea way)  Exercise 10: Seated ankle pumps x15-20  Exercise 12: Slantboard stretch [id number]  Exercise 14: R SL TM gait training 4mins  Exercise 15: Amb 1 big loop w/ 4ww    Manual:  Joint Mobilization: PROM all planes R ankle    Assessment  Assessment: Introduced R LE gait training on TM and amb one big loop, pt reported pain with amb and demoed stiffness with toe off and required cues for sufficient heel strike. Continued PROM and stretching w/ no pain noted during either. Will continue to progress as tolerated. Activity Tolerance  Activity Tolerance: Patient tolerated treatment well    Patient Education  Patient Education: Educated on gait cycle for R LE  Pt verbalized/demonstrated good understanding:     [x] Yes         [] No, pt required further clarification.      Post Treatment Pain:  5/10    Plan  Plan Frequency: 2  Plan weeks: 4     Goals  (Total # of Visits to Date: 10)      Short Term Goals  Time Frame for Short term goals: 3 weeks  Short term goal 1: Patient to initiate HEP for improved R ankle ROM and strength. -met  Short term goal 2: Patient to be instructed in open chain exercises to improve R ankle strength. -met  Short term goal 3: Initiate manual techniques/modalities prn to decrease pain and improve mobility.-met/cont    Long Term Goals  Time Frame for Long term goals : 6 weeks  Long term goal 1: Patient to be independent and compliant with HEP. Long term goal 2: Patient to have improved R ankle ROM DF: 10*, Inv: 40* and Ev: 15* to improve functional mobility. -met  Long term goal 3: Patient to have improved R ankle strength >/=4/5 grossly for improved stability. Long term goal 4: Patient to return to walking with no AD and minimal to no gait deviations with no LOB or fatigue to return to PLOF.     Minutes Tracking:  Time In: 7840  Time Out: 602 [address]  Minutes: 46  Timed Code Treatment Minutes: [address], [place]            Date: 4/29/2022

## 2022-05-02 ENCOUNTER — HOSPITAL ENCOUNTER (OUTPATIENT)
Dept: PHYSICAL THERAPY | Age: 52
Setting detail: THERAPIES SERIES
Discharge: HOME | End: 2022-05-02
Payer: COMMERCIAL

## 2022-05-02 PROCEDURE — 97110 THERAPEUTIC EXERCISES: CPT

## 2022-05-02 PROCEDURE — 97140 MANUAL THERAPY 1/> REGIONS: CPT

## 2022-05-02 NOTE — PROGRESS NOTES
Phone: Vandana           Fax: [phone]                           Outpatient Physical Therapy                                                                            Daily Note    Patient: Vivian Mejia : 1970  CSN #: [id number]   Referring Physician: MARTY ePnny -*    Date: 2022    Diagnosis: S82.61XA, S82.61XD; Closed displaced fracture of lateral malleolus of R fibula  Treatment Diagnosis: s/p R ankle ORIF    Onset Date: 22  PT Insurance Information: BCBS  Total # of Visits Approved: 12 Per Physician Order  Total # of Visits to Date: 11  No Show: 0  Canceled Appointment: 0    Pre-Treatment Pain:  6/10  Subjective: Pt reports pain of 6/10 on the bottom and medial portions of the foot, states that is has been worse in the mornings. Exercises:  Exercise 2: SciFit bike (no boot) 10mins lvl 4  Exercise 6: Purple TB 4 way ankle 20x ea  Exercise 10: Standing 3way ankle pumps x10-15  Exercise 11: FSU 4in x10 R LE with B HR\"s  Exercise 12: Slantboard stretch [id number]  Exercise 14: R SL TM gait training 2mins, celia LE gait training 2mins  Exercise 16: Calf raises on total gym x15    Manual:  Joint Mobilization: PROM all planes R ankle    Assessment  Assessment: Pt was encouraged to walk around gym w/o AD this date, reported slight pain but was suprised by ability. Continued to work gait on TM w/ pt demoing improved motion and understanding. 3 way and total gym calf raise introduced to improve range and strength of PF musculature. Pt required validation of progress being made and assurance that she is on the right path for recovery. Will continue to progress as tolerated. Activity Tolerance  Activity Tolerance: Patient tolerated treatment well    Patient Education  Patient Education: Continuation of HEP and walking around house  Pt verbalized/demonstrated good understanding:     [x] Yes         [] No, pt required further clarification. Post Treatment Pain:  6/10    Plan  Plan Frequency: 2  Plan weeks: 4     Goals  (Total # of Visits to Date: 6)      Short Term Goals  Time Frame for Short term goals: 3 weeks  Short term goal 1: Patient to initiate HEP for improved R ankle ROM and strength. -met  Short term goal 2: Patient to be instructed in open chain exercises to improve R ankle strength. -met  Short term goal 3: Initiate manual techniques/modalities prn to decrease pain and improve mobility.-met/cont    Long Term Goals  Time Frame for Long term goals : 6 weeks  Long term goal 1: Patient to be independent and compliant with HEP. Long term goal 2: Patient to have improved R ankle ROM DF: 10*, Inv: 40* and Ev: 15* to improve functional mobility. -met  Long term goal 3: Patient to have improved R ankle strength >/=4/5 grossly for improved stability. Long term goal 4: Patient to return to walking with no AD and minimal to no gait deviations with no LOB or fatigue to return to PLOF.     Minutes Tracking:  Time In: 5841  Time Out: Siomara 70  Minutes: 46  Timed Code Treatment Minutes: [address]            Date: 5/2/2022

## 2022-05-04 ENCOUNTER — HOSPITAL ENCOUNTER (OUTPATIENT)
Dept: PHYSICAL THERAPY | Age: 52
Setting detail: THERAPIES SERIES
Discharge: HOME | End: 2022-05-04
Payer: COMMERCIAL

## 2022-05-04 PROCEDURE — 97110 THERAPEUTIC EXERCISES: CPT

## 2022-05-04 PROCEDURE — 97140 MANUAL THERAPY 1/> REGIONS: CPT

## 2022-05-04 NOTE — PROGRESS NOTES
Phone: Vandana           Fax: [phone]                           Outpatient Physical Therapy                                                                            Daily Note    Patient: Vivian Mejia : 1970  CSN #: [id number]   Referring Physician: MARTY Penny -*    Date: 2022    Diagnosis: S82.61XA, S82.61XD; Closed displaced fracture of lateral malleolus of R fibula  Treatment Diagnosis: s/p R ankle ORIF    Onset Date: 22  PT Insurance Information: [facility]  Total # of Visits Approved: 12 Per Physician Order  Total # of Visits to Date: 12  No Show: 0  Canceled Appointment: 0    Pre-Treatment Pain:  6/10  Subjective: Pt states pain at 6/10 this date in the bottom of the foot and medial malleolus. Exercises:  Exercise 2: SciFit bike (no boot) 10mins lvl 4  Exercise 9: Standing BAPS lvl3 forward, lateral, cc, ccw [id number]  Exercise 10: Standing 3way ankle pumps x10-15  Exercise 11: R LE FSU 6\" x15, LSU 6\" x10, SD 4\" x6  Exercise 12: Slantboard stretch [id number]  Exercise 16: Calf raises on total gym x15    Manual:  Soft Tissue Mobilizaton: DTM and MFD with static cups to R plantar fascia to decrease tone and pain  Other: Cold thermoprobe to R plantar fascia and medial malleolus    Assessment  Assessment: Continued strengthening and stretching of L foot, pt reports pain and discomfort on bottom of foot and medial mallaeolus. Attempted R SLS but was discontinued d/t pain and discomfort. Applied cupping and cold thermoprobe to R plantar fascia and medial mallaeolus to reduce pain and tightness. Will progress as tolerated. Activity Tolerance  Activity Tolerance: Patient tolerated treatment well    Patient Education  Patient Education: Continuing HEP and walking mechanics  Pt verbalized/demonstrated good understanding:     [x] Yes         [] No, pt required further clarification.      Post Treatment Pain:  10    Plan  Plan Frequency: 2  Plan weeks: 4     Goals  (Total # of Visits to Date: 15)      Short Term Goals  Time Frame for Short term goals: 3 weeks  Short term goal 1: Patient to initiate HEP for improved R ankle ROM and strength. -met  Short term goal 2: Patient to be instructed in open chain exercises to improve R ankle strength. -met  Short term goal 3: Initiate manual techniques/modalities prn to decrease pain and improve mobility.-met/cont    Long Term Goals  Time Frame for Long term goals : 6 weeks  Long term goal 1: Patient to be independent and compliant with HEP. Long term goal 2: Patient to have improved R ankle ROM DF: 10*, Inv: 40* and Ev: 15* to improve functional mobility. -met  Long term goal 3: Patient to have improved R ankle strength >/=4/5 grossly for improved stability. Long term goal 4: Patient to return to walking with no AD and minimal to no gait deviations with no LOB or fatigue to return to PLOF.     Minutes Tracking:  Time In: 4504  Time Out: 4165  Minutes: 53  Timed Code Treatment Minutes: [place], [place]            Date: 5/4/2022

## 2022-05-06 ENCOUNTER — HOSPITAL ENCOUNTER (OUTPATIENT)
Dept: PHYSICAL THERAPY | Age: 52
Setting detail: THERAPIES SERIES
Discharge: HOME | End: 2022-05-06
Payer: COMMERCIAL

## 2022-05-06 PROCEDURE — 97035 APP MDLTY 1+ULTRASOUND EA 15: CPT

## 2022-05-06 PROCEDURE — 97140 MANUAL THERAPY 1/> REGIONS: CPT

## 2022-05-06 PROCEDURE — 97110 THERAPEUTIC EXERCISES: CPT

## 2022-05-06 NOTE — PLAN OF CARE
[facility]           Phone: [phone]             Outpatient Physical Therapy  Fax: [phone]                                           Date: 2022  Patient: Paige Hinton : 1970 CSN #: [id number]   Referring Physician: MARTY Fine -*  Referral Date:          [] Plan of Care   [x] Updated Plan of Care    Dates of Service to Include: 2022 to 22    Diagnosis:  S82.61XA, S82.61XD; Closed displaced fracture of lateral malleolus of R fibula    Rehab (Treatment) Diagnosis:  s/p R ankle ORIF             Onset Date:  22    Attendance  Total # of Visits to Date: 13 No Show: 0 Canceled Appointment: 0    Assessment  Assessment: Held standing ther ex this date d/t increased pain reported in R foot and medial malleolus. U/s test to medial malleolus was negative for fracture concern. Focused on manual techniques to decrease R plantar fascia pain and tone and introduced KT taping to proved R foot and ankle with more support and to decrease pain. Patient has attended 15 PT visits and is making steady progress toward goals for improved R ankle strength and gait with no AD and would benefit from continued PT to meet remaining LTG's and return to PLOF. Will continue to progress as able. Goals  Short Term Goals  Time Frame for Short term goals: 3 weeks  Short term goal 1: Patient to initiate HEP for improved R ankle ROM and strength. -met  Short term goal 2: Patient to be instructed in open chain exercises to improve R ankle strength. -met  Short term goal 3: Initiate manual techniques/modalities prn to decrease pain and improve mobility.-met/cont  Long Term Goals  Time Frame for Long term goals : 6 weeks  Long term goal 1: Patient to be independent and compliant with HEP.   Long term goal 2: Patient to have improved R ankle ROM DF: 10*, Inv: 40* and Ev: 15* to improve functional mobility. -met  Long term goal 3: Patient to have improved R ankle strength >/=4/5 grossly for improved stability. Long term goal 4: Patient to return to walking with no AD and minimal to no gait deviations with no LOB or fatigue to return to PLOF.      Prognosis  Therapy Prognosis: Good    Treatment Plan   Plan Frequency: 2  Plan weeks: 6  [x] HP/CP      [x] Electrical Stim   [x] Therapeutic Exercise      [x] Gait Training  [] Aquatics   [x] Ultrasound         [x] Patient Education/HEP   [x] Manual Therapy  [] Traction    [x] Neuro-tiana        [x] Soft Tissue Mobs            [] Home TENS  [] Iontophoresis    [] Orthotic casting/fitting      [] Dry Needling             Electronically signed by: Renita Self PT, DPT    Date: 5/6/2022      ______________________________________ Date: 5/6/2022   Physician Signature

## 2022-05-06 NOTE — PROGRESS NOTES
Phone: Vandana           Fax: [phone]                           Outpatient Physical Therapy                                                                            Daily Note    Patient: Amanda Wilson : 1970  CSN #: [id number]   Referring Physician: MARTY Ignacio -*    Date: 2022    Diagnosis: S82.61XA, S82.61XD; Closed displaced fracture of lateral malleolus of R fibula  Treatment Diagnosis: s/p R ankle ORIF    Onset Date: 22  PT Insurance Information: [employer]  Total # of Visits Approved: 24 Per Physician Order  Total # of Visits to Date: 13  No Show: 0  Canceled Appointment: 0      Pre-Treatment Pain:  5/10  Subjective: Patient reports last night she had 10/10 pain in medial ankle and bottom of foot that was constant. She's been taking ibuprofen today and pain is calmed down to 5/10. Exercises:  Exercise 2: SciFit bike (no boot) 10mins lvl 4    Manual:  Joint Mobilization: PROM all planes R ankle  Soft Tissue Mobilizaton: DTM to R plantar fascia to decrease tone and pain  Other: KT taping for R arch support and R plantar fascia to decrease pain    Modalities:   U/s x8min to R plantar fascia and medial malleolus to decrease inflammation and pain    Assessment  Assessment: Held standing ther ex this date d/t increased pain reported in R foot and medial malleolus. U/s test to medial malleolus was negative for fracture concern. Focused on manual techniques to decrease R plantar fascia pain and tone and introduced KT taping to proved R foot and ankle with more support and to decrease pain. Patient has attended 15 PT visits and is making steady progress toward goals for improved R ankle strength and gait with no AD and would benefit from continued PT to meet remaining LTG's and return to PLOF. Will continue to progress as able.     Activity Tolerance  Activity Tolerance: Patient tolerated treatment well,Patient limited by pain    Patient Education  Rationale for manual techniques and u/s    Pt verbalized/demonstrated good understanding:     [x] Yes         [] No, pt required further clarification. Post Treatment Pain:  4/10      Plan  Plan Frequency: 2  Plan weeks: 6       Goals  (Total # of Visits to Date: 15)      Short Term Goals  Time Frame for Short term goals: 3 weeks  Short term goal 1: Patient to initiate HEP for improved R ankle ROM and strength. -met  Short term goal 2: Patient to be instructed in open chain exercises to improve R ankle strength. -met  Short term goal 3: Initiate manual techniques/modalities prn to decrease pain and improve mobility.-met/cont    Long Term Goals  Time Frame for Long term goals : 6 weeks  Long term goal 1: Patient to be independent and compliant with HEP. Long term goal 2: Patient to have improved R ankle ROM DF: 10*, Inv: 40* and Ev: 15* to improve functional mobility. -met  Long term goal 3: Patient to have improved R ankle strength >/=4/5 grossly for improved stability. Long term goal 4: Patient to return to walking with no AD and minimal to no gait deviations with no LOB or fatigue to return to PLOF.     Minutes Tracking:  Time In: 1973  Time Out: 632 [employer]  Minutes: 52  Timed Code Treatment Minutes: 69 [facility], PT , DPT     Date: 5/6/2022

## 2022-05-10 ENCOUNTER — APPOINTMENT (OUTPATIENT)
Dept: PHYSICAL THERAPY | Age: 52
End: 2022-05-10
Payer: COMMERCIAL

## 2022-05-13 ENCOUNTER — APPOINTMENT (OUTPATIENT)
Dept: PHYSICAL THERAPY | Age: 52
End: 2022-05-13
Payer: COMMERCIAL

## 2022-05-17 ENCOUNTER — HOSPITAL ENCOUNTER (OUTPATIENT)
Dept: PHYSICAL THERAPY | Age: 52
Setting detail: THERAPIES SERIES
Discharge: HOME | End: 2022-05-17
Payer: COMMERCIAL

## 2022-05-17 PROCEDURE — 97140 MANUAL THERAPY 1/> REGIONS: CPT

## 2022-05-17 PROCEDURE — 97110 THERAPEUTIC EXERCISES: CPT

## 2022-05-17 NOTE — PROGRESS NOTES
Phone: Vandana           Fax: [phone]                           Outpatient Physical Therapy                                                                            Daily Note    Patient: Eloisa Pisano : 1970  CSN #: [id number]   Referring Physician: MARTY Reaves -*    Date: 2022       Treatment Diagnosis: s/p R ankle ORIF    Onset Date: 22  Total # of Visits Approved: 24 Per Physician Order  Total # of Visits to Date: 14  No Show: 0  Canceled Appointment: 0    Pre-Treatment Pain:  4/10  Subjective: Pt reports overall she isnt feeling to bad but shes still getting alot of swelling. Pt rates current pain a /10. Exercises:  Exercise 2: SciFit bike (no boot) 10mins lvl 4  Exercise 5: towel stretch 3x30  Exercise 6: Purple TB 4 way ankle 20x ea  Exercise 11: R LE FSU 6\" x15, LSU 6\" x10, SD 4\" x6  Exercise 12: Slantboard stretch [id number]  Exercise 13: DL heel raises/toe raises in standing x10  Exercise 16: Calf raises on total gym x15 calf matrix first step  Exercise 17: SLS 3x30    Manual:  Joint Mobilization: PROM all planes R ankle    Assessment  Assessment: Decreased pain noted today with ther-ex program.  Focused on ankle strategy for greater stability with ADLs. Will continue to progress as Pt tolerates. Activity Tolerance  Activity Tolerance: Patient tolerated treatment well    Patient Education  Patient Education: Continuing HEP and walking mechanics  Pt verbalized/demonstrated good understanding:     [x] Yes         [] No, pt required further clarification. Post Treatment Pain:  4/10    Plan  Plan Frequency: 2  Plan weeks: 6     Goals  (Total # of Visits to Date: 15)      Short Term Goals  Time Frame for Short term goals: 3 weeks  Short term goal 1: Patient to initiate HEP for improved R ankle ROM and strength. -met  Short term goal 2: Patient to be instructed in open chain exercises to improve R ankle strength. -met  Short term goal 3: Initiate manual techniques/modalities prn to decrease pain and improve mobility.-met/cont    Long Term Goals  Time Frame for Long term goals : 6 weeks  Long term goal 1: Patient to be independent and compliant with HEP. Long term goal 2: Patient to have improved R ankle ROM DF: 10*, Inv: 40* and Ev: 15* to improve functional mobility. -met  Long term goal 3: Patient to have improved R ankle strength >/=4/5 grossly for improved stability. Long term goal 4: Patient to return to walking with no AD and minimal to no gait deviations with no LOB or fatigue to return to PLOF.     Minutes Tracking:  Time In: 6872  Time Out: 0882  Minutes: 44  Timed Code Treatment Minutes: 2005 [address] [place], [place]           Date: 5/17/2022

## 2022-05-19 ENCOUNTER — HOSPITAL ENCOUNTER (OUTPATIENT)
Dept: PHYSICAL THERAPY | Age: 52
Setting detail: THERAPIES SERIES
Discharge: HOME | End: 2022-05-19
Payer: COMMERCIAL

## 2022-05-19 PROCEDURE — 97110 THERAPEUTIC EXERCISES: CPT

## 2022-05-19 NOTE — PROGRESS NOTES
Phone: Vandana           Fax: [phone]                           Outpatient Physical Therapy                                                                            Daily Note    Patient: Marcella Barry : 1970  CSN #: [id number]   Referring Physician: MARTY Celaya -*    Date: 2022    Diagnosis: S82.61XA, S82.61XD; Closed displaced fracture of lateral malleolus of R fibula  Treatment Diagnosis: s/p R ankle ORIF    Onset Date: 22  PT Insurance Information: BCBS  Total # of Visits Approved: 24 Per Physician Order  Total # of Visits to Date: 15  No Show: 0  Canceled Appointment: 0      Pre-Treatment Pain:  4/10  Subjective: States pain is 3-4/10. States bottom of her foot hurts    Exercises:  Exercise 2: SciFit bike (no boot) 10mins lvl 4  Exercise 9: Standing BAPS lvl2 forward, lateral, cc, ccw W35DW  Exercise 10: Standing 3way ankle pumps x10-15  Exercise 11: R LE FSU 6\" x15, LSU 6\" x10, SD 4\" x6  Exercise 12: Slantboard stretch [id number]  Exercise 13: DL heel raises/toe raises in standing x10  Exercise 17: SLS 3x30  airex     Assessment  Assessment: Pt is feeling a little better. States pain is 3-4/10 bottom of her foot. Ankle stabiluity exercise performed. Weakness with PF noted and struggle with single leg balance activities. Swelling remains moderate. HEP progressed    Activity Tolerance  Activity Tolerance: Patient tolerated treatment well    Patient Education  Patient Education: Progression of HEP  Pt verbalized/demonstrated good understanding:     [x] Yes         [] No, pt required further clarification. Post Treatment Pain:  3/10      Plan  Plan Frequency: 2  Plan weeks: 6       Goals  (Total # of Visits to Date: 13)      Short Term Goals  Time Frame for Short term goals: 3 weeks  Short term goal 1: Patient to initiate HEP for improved R ankle ROM and strength. -met  Short term goal 2: Patient to be instructed in open chain exercises to improve R ankle strength. -met  Short term goal 3: Initiate manual techniques/modalities prn to decrease pain and improve mobility.-met/cont    Long Term Goals  Time Frame for Long term goals : 6 weeks  Long term goal 1: Patient to be independent and compliant with HEP. Long term goal 2: Patient to have improved R ankle ROM DF: 10*, Inv: 40* and Ev: 15* to improve functional mobility. -met  Long term goal 3: Patient to have improved R ankle strength >/=4/5 grossly for improved stability. Long term goal 4: Patient to return to walking with no AD and minimal to no gait deviations with no LOB or fatigue to return to PLOF.     Minutes Tracking:  Time In: 0730  Time Out: 42 Gabriela Macku Anthony  Minutes: 44  Timed Code Treatment Minutes: 104 TARA Monetzmomarai t [facility]     Date: 5/19/2022

## 2022-05-27 ENCOUNTER — APPOINTMENT (OUTPATIENT)
Dept: PHYSICAL THERAPY | Age: 52
End: 2022-05-27
Payer: COMMERCIAL

## 2022-05-31 ENCOUNTER — HOSPITAL ENCOUNTER (OUTPATIENT)
Dept: PHYSICAL THERAPY | Age: 52
Setting detail: THERAPIES SERIES
Discharge: HOME | End: 2022-05-31
Payer: COMMERCIAL

## 2022-05-31 PROCEDURE — 97110 THERAPEUTIC EXERCISES: CPT

## 2022-05-31 NOTE — PROGRESS NOTES
Phone: Vandana           Fax: [phone]                           Outpatient Physical Therapy                                                                            Daily Note    Patient: Paige Hinton : 1970  CSN #: [id number]   Referring Physician: MARTY Fine -*    Date: 2022     Treatment Diagnosis: s/p R ankle ORIF    Onset Date: 22  Total # of Visits Approved: 24 Per Physician Order  Total # of Visits to Date: 16  No Show: 0  Canceled Appointment: 0    Pre-Treatment Pain:  2/10  Subjective: Pt reports she feels imporvements with her ankle. She reports she isnt quite there yet but shes feeling improvements. Pt rates current pain a 1-2/10. Exercises:  Exercise 2: SciFit bike (no boot) 10mins lvl 4  Exercise 3: standing rocker board (1min ea way)  Exercise 4: tandem walk, bee bop walk 20' ea  Exercise 6: Purple TB 4 way ankle 20x ea  Exercise 7: slant board stretch 3x30  Exercise 8: retro rossana 13# 5x  Exercise 10: Standing 3way ankle pumps x10-15  Exercise 11: R LE FSU 6\" x15, LSU 6\" x10, SD 4\" x6  Exercise 12: Slantboard stretch [id number]  Exercise 13: DL heel raises/toe raises in standing x10  Exercise 15: 10# soleus raise 15x2  Exercise 16: Calf raises on total gym x15 calf matrix first step  Exercise 17: SLS 3x30  airex    Assessment  Assessment: Pt continues to struggle with single leg stance exercises. SLS time less than 20 seconds today prior to needing UE assist.    Activity Tolerance  Activity Tolerance: Patient tolerated treatment well    Patient Education  Patient Education: ankle strategy  Pt verbalized/demonstrated good understanding:     [x] Yes         [] No, pt required further clarification.      Post Treatment Pain:  210    Plan  Plan Frequency: 2  Plan weeks: 6     Goals  (Total # of Visits to Date: 12)      Short Term Goals  Time Frame for Short term goals: 3 weeks  Short term goal 1: Patient to initiate HEP for improved R ankle ROM and strength. -met  Short term goal 2: Patient to be instructed in open chain exercises to improve R ankle strength. -met  Short term goal 3: Initiate manual techniques/modalities prn to decrease pain and improve mobility.-met/cont    Long Term Goals  Time Frame for Long term goals : 6 weeks  Long term goal 1: Patient to be independent and compliant with HEP. Long term goal 2: Patient to have improved R ankle ROM DF: 10*, Inv: 40* and Ev: 15* to improve functional mobility. -met  Long term goal 3: Patient to have improved R ankle strength >/=4/5 grossly for improved stability. Long term goal 4: Patient to return to walking with no AD and minimal to no gait deviations with no LOB or fatigue to return to PLOF.     Minutes Tracking:  Time In: 6919  Time Out: 3863  Minutes: 44  Timed Code Treatment Minutes: 2005 A [place], [place]         Date: 5/31/2022

## 2022-06-02 ENCOUNTER — HOSPITAL ENCOUNTER (OUTPATIENT)
Dept: PHYSICAL THERAPY | Age: 52
Setting detail: THERAPIES SERIES
Discharge: HOME | End: 2022-06-02
Payer: COMMERCIAL

## 2022-06-02 PROCEDURE — 97110 THERAPEUTIC EXERCISES: CPT

## 2022-06-06 NOTE — PROGRESS NOTES
Phone: Vandana           Fax: [phone]                           Outpatient Physical Therapy                                                                            Daily Note    Patient: Marcella Barry : 1970  CSN #: [id number]   Referring Physician: MARTY Celaya -*    Date: 2022    Diagnosis: S82.61XA, S82.61XD; Closed displaced fracture of lateral malleolus of R fibula  Treatment Diagnosis: s/p R ankle ORIF    Onset Date: 22  PT Insurance Information: BCBS  Total # of Visits Approved: 24 Per Physician Order  Total # of Visits to Date:   No Show: 0  Canceled Appointment: 0      Pre-Treatment Pain:  2/10  Subjective: Pt states she feeling better. States she has some ROM limitation with functionsl activities. Exercises:  Exercise 2: SciFit bike (no boot) 10mins lvl 4  Exercise 3: standing rocker board (1min ea way)  Exercise 4: tandem walk, bee bop walk 20' ea  Exercise 5: towel stretch 3x30  Exercise 6: Purple TB 4 way ankle 20x ea  Exercise 7: slant board stretch 3x30  Exercise 8: retro rossana 13# 8x  Exercise 9: Standing BAPS lvl2 forward, lateral, cc, ccw R66TG  Exercise 10: Standing 3way ankle pumps x10-15  Exercise 11: R LE LSU 6\" x15, SD 6\" x15  Exercise 12: Slantboard stretch [id number]  Exercise 15: 10# soleus raise 15x2  Exercise 16: Calf raises on total gym x15 calf matrix first step  Exercise 17: SLS 3x30  airex  Exercise 18: Rocker board standing  Exercise 19: FSU 6'' w/ heel raise 2X15       Assessment  Assessment: Pt continues to struggle with single leg stance exercises, minimal motion with plantar flexion exercises. ROM in all planes continues to improve. Activity Tolerance  Activity Tolerance: Patient tolerated treatment well    Patient Education  Patient Education: HEP  Pt verbalized/demonstrated good understanding:     [x] Yes         [] No, pt required further clarification.        Post Treatment Pain: 2/10      Plan  Plan Frequency: 2  Plan weeks: 6       Goals  (Total # of Visits to Date: 16)      Short Term Goals  Time Frame for Short term goals: 3 weeks  Short term goal 1: Patient to initiate HEP for improved R ankle ROM and strength. -met  Short term goal 2: Patient to be instructed in open chain exercises to improve R ankle strength. -met  Short term goal 3: Initiate manual techniques/modalities prn to decrease pain and improve mobility.-met/cont    Long Term Goals  Time Frame for Long term goals : 6 weeks  Long term goal 1: Patient to be independent and compliant with HEP. Long term goal 2: Patient to have improved R ankle ROM DF: 10*, Inv: 40* and Ev: 15* to improve functional mobility. -met  Long term goal 3: Patient to have improved R ankle strength >/=4/5 grossly for improved stability. Long term goal 4: Patient to return to walking with no AD and minimal to no gait deviations with no LOB or fatigue to return to PLOF.     Minutes Tracking:  Time In: 0730  Time Out: 0815  Minutes: 45  Timed Code Treatment Minutes: 44 [facility] Daniele     Date: 6/2/2022

## 2022-06-07 ENCOUNTER — HOSPITAL ENCOUNTER (OUTPATIENT)
Dept: PHYSICAL THERAPY | Age: 52
Setting detail: THERAPIES SERIES
Discharge: HOME | End: 2022-06-07
Payer: COMMERCIAL

## 2022-06-07 PROCEDURE — 97110 THERAPEUTIC EXERCISES: CPT

## 2022-06-07 NOTE — PROGRESS NOTES
Phone: Vandana           Fax: [phone]                           Outpatient Physical Therapy                                                                            Daily Note    Patient: Luc Nassar : 1970  CSN #: [id number]   Referring Physician: MARTY Pollard -*    Date: 2022     Treatment Diagnosis: s/p R ankle ORIF    Onset Date: 22  Total # of Visits Approved: 24 Per Physician Order  Total # of Visits to Date: 25  No Show: 0  Canceled Appointment: 0    Pre-Treatment Pain:  0/10  Subjective: Pt denies current pain. Pt reports she feels like shes getting better and closer to normal.    Exercises:  Exercise 2: SciFit bike (no boot) 10mins lvl 4  Exercise 4: tandem walk, bee bop walk 20' ea  Exercise 7: slant board stretch 3x30  Exercise 8: retro rossana 13# 8x  Exercise 10: Standing 3way ankle pumps x10-15  Exercise 11: R LE LSU 6\" x15, SD 6\" x15  Exercise 12: Slantboard stretch [id number]  Exercise 13: DL heel raises/toe raises in standing x10  Exercise 14: TM amb 5 mins 1.6 mph  Exercise 15: 10# soleus raise 15x2  Exercise 16: Calf raises on total gym x15 calf matrix first step  Exercise 17: SLS 3x30  airex 2# [place]. Exercise 19: FSU 6'' w/ heel raise 2X15    Assessment  Assessment: Better gait pattern noted today with less step to and antalgic gait cycle noted. Pt continues to display weakness with R ankle strategy with dynamic exercises but strength progressing. Will continue. Activity Tolerance  Activity Tolerance: Patient tolerated treatment well    Patient Education  Patient Education: Continue stretching, continue dynamic exercises. Pt verbalized/demonstrated good understanding:     [x] Yes         [] No, pt required further clarification.      Post Treatment Pain:  0/10    Plan  Plan Frequency: 2  Plan weeks: 6     Goals  (Total # of Visits to Date: 25)      Short Term Goals  Time Frame for Short term goals: 3 weeks  Short term goal 1: Patient to initiate HEP for improved R ankle ROM and strength. -met  Short term goal 2: Patient to be instructed in open chain exercises to improve R ankle strength. -met  Short term goal 3: Initiate manual techniques/modalities prn to decrease pain and improve mobility.-met/cont    Long Term Goals  Time Frame for Long term goals : 6 weeks  Long term goal 1: Patient to be independent and compliant with HEP. Long term goal 2: Patient to have improved R ankle ROM DF: 10*, Inv: 40* and Ev: 15* to improve functional mobility. -met  Long term goal 3: Patient to have improved R ankle strength >/=4/5 grossly for improved stability. Long term goal 4: Patient to return to walking with no AD and minimal to no gait deviations with no LOB or fatigue to return to PLOF.     Minutes Tracking:  Time In: 0288  Time Out: 0801  Minutes: 45  Timed Code Treatment Minutes: 2005 [place], [place]          Date: 6/7/2022

## 2022-06-09 ENCOUNTER — HOSPITAL ENCOUNTER (OUTPATIENT)
Dept: PHYSICAL THERAPY | Age: 52
Setting detail: THERAPIES SERIES
Discharge: HOME | End: 2022-06-09
Payer: COMMERCIAL

## 2022-06-09 PROCEDURE — 97110 THERAPEUTIC EXERCISES: CPT

## 2022-06-09 NOTE — PROGRESS NOTES
Phone: Vandana           Fax: [phone]                           Outpatient Physical Therapy                                                                            Daily Note    Patient: Andre Becerril : 1970  CSN #: [id number]   Referring Physician: MARTY Watters -*    Date: 2022    Diagnosis: S82.61XA, S82.61XD; Closed displaced fracture of lateral malleolus of R fibula  Treatment Diagnosis: s/p R ankle ORIF    Onset Date: 22  PT Insurance Information: [facility]  Total # of Visits Approved: 24 Per Physician Order  Total # of Visits to Date: 23  No Show: 0  Canceled Appointment: 0      Pre-Treatment Pain:  0/10  Subjective: Pt. deinies pain coming into therapy today, says she still struggles a bit with balance and strength. Exercises:  Exercise 2: SciFit bike (no boot) 10mins lvl 4  Exercise 3: standing rocker board (1min ea way)  Exercise 4: tandem walk, bee bop walk 20' ea  Exercise 7: slant board stretch 3x30  Exercise 8: retro rossana 13# 8x  Exercise 9: Standing BAPS lvl2 forward, lateral, cc, ccw [id number]  Exercise 11: R LE LSU 6\" x15, SD 6\" x15  Exercise 12: Slantboard stretch [id number]  Exercise 14: TM amb 7 mins 1.8 mph  Exercise 15: 10# soleus raise 15x2  Exercise 16: Calf raises on total gym x15 calf matrix first step  Exercise 17: SLS 3x30  airex 2# around the world. Exercise 19: FSU 6'' w/ heel raise 2X15    Assessment  Assessment: Gait pattern continues to improve, weakness in R ankle plantar flexion continues to be a struggle. Strength & balance is improving overall. Activity Tolerance  Activity Tolerance: Patient tolerated treatment well    Patient Education     Pt verbalized/demonstrated good understanding:     [x] Yes         [] No, pt required further clarification.        Post Treatment Pain: 0/10      Plan  Plan Frequency: 2  Plan weeks: 6       Goals  (Total # of Visits to Date: 23)      Short Term Goals  Time Frame for Short term goals: 3 weeks  Short term goal 1: Patient to initiate HEP for improved R ankle ROM and strength. -met  Short term goal 2: Patient to be instructed in open chain exercises to improve R ankle strength. -met  Short term goal 3: Initiate manual techniques/modalities prn to decrease pain and improve mobility.-met/cont    Long Term Goals  Time Frame for Long term goals : 6 weeks  Long term goal 1: Patient to be independent and compliant with HEP. Long term goal 2: Patient to have improved R ankle ROM DF: 10*, Inv: 40* and Ev: 15* to improve functional mobility. -met  Long term goal 3: Patient to have improved R ankle strength >/=4/5 grossly for improved stability. Long term goal 4: Patient to return to walking with no AD and minimal to no gait deviations with no LOB or fatigue to return to PLOF.     Minutes Tracking:  Time In: 0732  Time Out: 0818  Minutes: 46  Timed Code Treatment Minutes: Jimi León A Lexi  [facility]     Date: 6/9/2022

## 2022-06-14 ENCOUNTER — HOSPITAL ENCOUNTER (OUTPATIENT)
Dept: PHYSICAL THERAPY | Age: 52
Setting detail: THERAPIES SERIES
Discharge: HOME | End: 2022-06-14
Payer: COMMERCIAL

## 2022-06-14 PROCEDURE — 97110 THERAPEUTIC EXERCISES: CPT

## 2022-06-14 NOTE — PROGRESS NOTES
Phone: Vandana           Fax: [phone]                           Outpatient Physical Therapy                                                                            Daily Note    Patient: Peyton Ellis : 1970  CSN #: [id number]   Referring Physician: MARTY Herndon -*    Date: 2022       Treatment Diagnosis: s/p R ankle ORIF    Onset Date: 22  Total # of Visits Approved: 24 Per Physician Order  Total # of Visits to Date: 20  No Show: 0  Canceled Appointment: 0    Pre-Treatment Pain:  0/10  Subjective: Pt reports shes been really working alot at home and feels her ankle is coming along. Pt denies current pain. Exercises:  Exercise 1: HEP: ankle ABC's, towel DF stretch, foot roller in sitting  Exercise 2: SciFit bike (no boot) 10mins lvl 4  Exercise 4: tandem walk, bee bop walk 20' ea  Exercise 7: slant board stretch 3x30  Exercise 8: retro rossana 13# 8x  Exercise 10: Standing 3way ankle pumps x10-15  Exercise 11: R LE LSU 6\" x15, SD 6\" x15  Exercise 12: Slantboard stretch [id number]  Exercise 13: DL heel raises/toe raises in standing x10  Exercise 15: 20# soleus raise 15x2  Exercise 17: SLS 3x30  airex 2# around the world. Exercise 19: FSU 6'' w/ heel raise 2X15    Assessment  Assessment: R ankle AROM in long sitting PF: 52*, DF: 12*, Inversion: 40*, Eversion 9*. Activity Tolerance  Activity Tolerance: Patient tolerated treatment well    Patient Education  Patient Education: Continue HEP. Pt verbalized/demonstrated good understanding:     [x] Yes         [] No, pt required further clarification. Post Treatment Pain:  0/10    Plan  Plan Frequency: 2  Plan weeks: 6     Goals  (Total # of Visits to Date: 21)      Short Term Goals  Time Frame for Short term goals: 3 weeks  Short term goal 1: Patient to initiate HEP for improved R ankle ROM and strength. -met  Short term goal 2: Patient to be instructed in open chain exercises to improve R ankle strength. -met  Short term goal 3: Initiate manual techniques/modalities prn to decrease pain and improve mobility.-met/cont    Long Term Goals  Time Frame for Long term goals : 6 weeks  Long term goal 1: Patient to be independent and compliant with HEP. Long term goal 2: Patient to have improved R ankle ROM DF: 10*, Inv: 40* and Ev: 15* to improve functional mobility. -met  Long term goal 3: Patient to have improved R ankle strength >/=4/5 grossly for improved stability. Long term goal 4: Patient to return to walking with no AD and minimal to no gait deviations with no LOB or fatigue to return to PLOF.     Minutes Tracking:  Time In: 5151  Time Out: 7854  Minutes: 41  Timed Code Treatment Minutes: Herminio Bojorquez [address] [place] [address]          Date: 6/14/2022

## 2022-06-17 ENCOUNTER — HOSPITAL ENCOUNTER (OUTPATIENT)
Dept: PHYSICAL THERAPY | Age: 52
Setting detail: THERAPIES SERIES
Discharge: HOME | End: 2022-06-17
Payer: COMMERCIAL

## 2022-06-17 PROCEDURE — 97110 THERAPEUTIC EXERCISES: CPT

## 2022-06-17 NOTE — PROGRESS NOTES
Phone: Vandana           Fax: [phone]                           Outpatient Physical Therapy                                                                            Daily Note    Patient: Lincoln Alexander : 1970  CSN #: [id number]   Referring Physician: MARTY Hernandez -*    Date: 2022       Treatment Diagnosis: s/p R ankle ORIF    Onset Date: 22  Total # of Visits Approved: 24 Per Physician Order  Total # of Visits to Date: 21  No Show: 0  Canceled Appointment: 0    Pre-Treatment Pain:  0/10  Subjective: Pt states she has been feeling pretty good with not much pain overall. Pt denies current pain rating. Exercises:  Exercise 2: SciFit bike (no boot) 10mins lvl 4  Exercise 7: slant board stretch 3x30  Exercise 11: R LE LSU 6\" x15, SD 6\" x15  Exercise 12: Slantboard stretch [id number]  Exercise 15: 20# soleus raise 15x2  Exercise 19: FSU 6'' w/ heel raise 2X15    Assessment  Assessment: R ankle AROM in long sitting PF: 52*, DF: 12*, Inversion: 40*, Eversion 9* with MMT of 4 to 4+ in all ranges with open chain strength testing. Pt able to maintain R sls for 20 seconds compared to 21 on L. Pt reports approx 15-20 min walking program at home and has been building slowly for increased endurance. Pt able to R and L single leg toe raise L>R. Pain reports 0-1/10 on noral day to day basis with 90% overall improvement reported from Pt since begining therapy. Plan is to d/c at this point to HEP. Activity Tolerance  Activity Tolerance: Patient tolerated treatment well    Patient Education  Patient Education: Reviewed HEP for d/c.  Pt verbalized/demonstrated good understanding:     [x] Yes         [] No, pt required further clarification.      Post Treatment Pain:  0/10    Plan  Plan Frequency: 2  Plan weeks: 6     Goals  (Total # of Visits to Date: 24)      Short Term Goals  Time Frame for Short term goals: 3 weeks  Short term goal 1: Patient to initiate HEP for improved R ankle ROM and strength. -met  Short term goal 2: Patient to be instructed in open chain exercises to improve R ankle strength. -met  Short term goal 3: Initiate manual techniques/modalities prn to decrease pain and improve mobility.-met/cont    Long Term Goals  Time Frame for Long term goals : 6 weeks  Long term goal 1: Patient to be independent and compliant with HEP. Long term goal 2: Patient to have improved R ankle ROM DF: 10*, Inv: 40* and Ev: 15* to improve functional mobility. -met  Long term goal 3: Patient to have improved R ankle strength >/=4/5 grossly for improved stability. Long term goal 4: Patient to return to walking with no AD and minimal to no gait deviations with no LOB or fatigue to return to PLOF.     Minutes Tracking:  Time In: 0801  Time Out: 0830  Minutes: [address] [place], [place]          Date: 6/17/2022

## 2022-08-17 NOTE — DISCHARGE SUMMARY
Phone: Vandana          Fax: [phone]                            Outpatient Physical Therapy                                                                    Discharge Summary    Patient: Antonio Powell  : 1970  [facility] #: [id number]   Referring Physician: MARTY Mena -*   Diagnosis: I61.34VN, S82.61XD; Closed displaced fracture of lateral malleolus of R fibula  Treatment Diagnosis: s/p R ankle ORIF      Date Treatment Initiated: 22  Date of Last Treatment: 22      PT Visit Information  Onset Date: 22  PT Insurance Information: [employer]  Total # of Visits Approved: 24  Total # of Visits to Date:   Plan of Care/Certification Expiration Date: 22  No Show: 0  Canceled Appointment: 0      Frequency/Duration  Plan Frequency: 2 times per week  Plan weeks: 6 weeks      Treatment Received  [x] HP/CP      [x] Electrical Stim   [x] Therapeutic Exercise      [x] Gait Training  [] Aquatics   [] Ultrasound         [x] Patient Education/HEP   [x] Manual Therapy  [] Traction    [x] Neuro-tiana        [x] Soft Tissue Mobs            [] Home TENS  [] Iontophoresis    [] Orthotic casting/fitting      [] Dry Needling    Assessment  Assessment: Patient attended 21 PT visits s/p R ankle ORIF and met all goals for improved R ankle ROM and strength and improved ambulation tolerance and independence with HEP. Will dc patient at this time d/t meeting all goals. Goals  Short Term Goals  Time Frame for Short term goals: 3 weeks  Short term goal 1: Patient to initiate HEP for improved R ankle ROM and strength. -met  Short term goal 2: Patient to be instructed in open chain exercises to improve R ankle strength. -met  Short term goal 3: Initiate manual techniques/modalities prn to decrease pain and improve mobility.-met/cont    Long Term Goals  Time Frame for Long term goals : 6 weeks  Long term goal 1: Patient to be independent and compliant with HEP.  Long term goal 2: Patient to have improved R ankle ROM DF: 10*, Inv: 40* and Ev: 15* to improve functional mobility. -met  Long term goal 3: Patient to have improved R ankle strength >/=4/5 grossly for improved stability. Long term goal 4: Patient to return to walking with no AD and minimal to no gait deviations with no LOB or fatigue to return to PLOF.       Reason for Discharge  [x] Goals Achieved                        []  Poor Follow Through/Attendance                  [x]  Optimal Function Achieved     []  Patient Discharged Self    []  Hospitalization                         []  Physician discharge      Thank you for this referral      Joey Watts, PT, DPT               Date: 8/17/2022

## 2023-02-11 ENCOUNTER — HOSPITAL ENCOUNTER (EMERGENCY)
Age: 53
Discharge: HOME | End: 2023-02-12
Attending: EMERGENCY MEDICINE
Payer: COMMERCIAL

## 2023-02-11 ENCOUNTER — APPOINTMENT (OUTPATIENT)
Dept: GENERAL RADIOLOGY | Age: 53
End: 2023-02-11
Payer: COMMERCIAL

## 2023-02-11 DIAGNOSIS — R07.89 ATYPICAL CHEST PAIN: Primary | ICD-10-CM

## 2023-02-11 PROCEDURE — 6360000002 HC RX W HCPCS: Performed by: EMERGENCY MEDICINE

## 2023-02-11 PROCEDURE — 36415 COLL VENOUS BLD VENIPUNCTURE: CPT

## 2023-02-11 PROCEDURE — 85025 COMPLETE CBC W/AUTO DIFF WBC: CPT

## 2023-02-11 PROCEDURE — 85379 FIBRIN DEGRADATION QUANT: CPT

## 2023-02-11 PROCEDURE — 2580000003 HC RX 258: Performed by: EMERGENCY MEDICINE

## 2023-02-11 PROCEDURE — 93005 ELECTROCARDIOGRAM TRACING: CPT | Performed by: EMERGENCY MEDICINE

## 2023-02-11 PROCEDURE — 6370000000 HC RX 637 (ALT 250 FOR IP): Performed by: EMERGENCY MEDICINE

## 2023-02-11 PROCEDURE — 84484 ASSAY OF TROPONIN QUANT: CPT

## 2023-02-11 PROCEDURE — 80048 BASIC METABOLIC PNL TOTAL CA: CPT

## 2023-02-11 PROCEDURE — 71045 X-RAY EXAM CHEST 1 VIEW: CPT

## 2023-02-11 PROCEDURE — 99285 EMERGENCY DEPT VISIT HI MDM: CPT

## 2023-02-11 PROCEDURE — 96374 THER/PROPH/DIAG INJ IV PUSH: CPT

## 2023-02-11 RX ORDER — 0.9 % SODIUM CHLORIDE 0.9 %
500 INTRAVENOUS SOLUTION INTRAVENOUS ONCE
Status: COMPLETED | OUTPATIENT
Start: 2023-02-11 | End: 2023-02-12

## 2023-02-11 RX ORDER — KETOROLAC TROMETHAMINE 15 MG/ML
15 INJECTION, SOLUTION INTRAMUSCULAR; INTRAVENOUS ONCE
Status: COMPLETED | OUTPATIENT
Start: 2023-02-12 | End: 2023-02-11

## 2023-02-11 RX ORDER — ASPIRIN 81 MG/1
162 TABLET, CHEWABLE ORAL ONCE
Status: COMPLETED | OUTPATIENT
Start: 2023-02-11 | End: 2023-02-11

## 2023-02-11 RX ORDER — NITROGLYCERIN 0.4 MG/1
0.4 TABLET SUBLINGUAL EVERY 5 MIN PRN
Status: DISCONTINUED | OUTPATIENT
Start: 2023-02-11 | End: 2023-02-12 | Stop reason: HOSPADM

## 2023-02-11 RX ADMIN — ASPIRIN 162 MG: 81 TABLET, CHEWABLE ORAL at 23:13

## 2023-02-11 RX ADMIN — SODIUM CHLORIDE 500 ML: 9 INJECTION, SOLUTION INTRAVENOUS at 23:14

## 2023-02-11 RX ADMIN — NITROGLYCERIN 0.4 MG: 0.4 TABLET SUBLINGUAL at 23:13

## 2023-02-11 RX ADMIN — KETOROLAC TROMETHAMINE 15 MG: 15 INJECTION, SOLUTION INTRAMUSCULAR; INTRAVENOUS at 23:59

## 2023-02-11 ASSESSMENT — PAIN - FUNCTIONAL ASSESSMENT: PAIN_FUNCTIONAL_ASSESSMENT: 0-10

## 2023-02-12 VITALS
HEART RATE: 87 BPM | DIASTOLIC BLOOD PRESSURE: 71 MMHG | TEMPERATURE: 97.8 F | WEIGHT: 248 LBS | BODY MASS INDEX: 48.69 KG/M2 | OXYGEN SATURATION: 96 % | HEIGHT: 60 IN | SYSTOLIC BLOOD PRESSURE: 119 MMHG | RESPIRATION RATE: 26 BRPM

## 2023-02-12 LAB
ABSOLUTE EOS #: 0.13 K/UL (ref 0–0.44)
ABSOLUTE IMMATURE GRANULOCYTE: 0.07 K/UL (ref 0–0.3)
ABSOLUTE LYMPH #: 3.26 K/UL (ref 1.1–3.7)
ABSOLUTE MONO #: 0.74 K/UL (ref 0.1–1.2)
ANION GAP SERPL CALCULATED.3IONS-SCNC: 10 MMOL/L (ref 9–17)
BASOPHILS # BLD: 0 % (ref 0–2)
BASOPHILS ABSOLUTE: 0.03 K/UL (ref 0–0.2)
BUN SERPL-MCNC: 15 MG/DL (ref 6–20)
BUN/CREAT BLD: 19 (ref 9–20)
CALCIUM SERPL-MCNC: 10.3 MG/DL (ref 8.6–10.4)
CHLORIDE SERPL-SCNC: 97 MMOL/L (ref 98–107)
CO2 SERPL-SCNC: 26 MMOL/L (ref 20–31)
CREAT SERPL-MCNC: 0.81 MG/DL (ref 0.5–0.9)
D DIMER BLD IA.RAPID-MCNC: 0.46 MG/L FEU (ref 0–0.59)
EKG ATRIAL RATE: 92 BPM
EKG P AXIS: 48 DEGREES
EKG P-R INTERVAL: 130 MS
EKG Q-T INTERVAL: 382 MS
EKG QRS DURATION: 98 MS
EKG QTC CALCULATION (BAZETT): 472 MS
EKG R AXIS: 6 DEGREES
EKG T AXIS: 24 DEGREES
EKG VENTRICULAR RATE: 92 BPM
EOSINOPHILS RELATIVE PERCENT: 1 % (ref 1–4)
GFR SERPL CREATININE-BSD FRML MDRD: >60 ML/MIN/1.73M2
GLUCOSE SERPL-MCNC: 109 MG/DL (ref 70–99)
HCT VFR BLD AUTO: 38.5 % (ref 36.3–47.1)
HGB BLD-MCNC: 12.7 G/DL (ref 11.9–15.1)
IMMATURE GRANULOCYTES: 1 %
LYMPHOCYTES # BLD: 29 % (ref 24–43)
MCH RBC QN AUTO: 29.1 PG (ref 25.2–33.5)
MCHC RBC AUTO-ENTMCNC: 33 G/DL (ref 28.4–34.8)
MCV RBC AUTO: 88.3 FL (ref 82.6–102.9)
MONOCYTES # BLD: 7 % (ref 3–12)
NRBC AUTOMATED: 0 PER 100 WBC
PDW BLD-RTO: 12.9 % (ref 11.8–14.4)
PLATELET # BLD AUTO: 321 K/UL (ref 138–453)
PMV BLD AUTO: 9.8 FL (ref 8.1–13.5)
POTASSIUM SERPL-SCNC: 3.8 MMOL/L (ref 3.7–5.3)
RBC # BLD: 4.36 M/UL (ref 3.95–5.11)
SEG NEUTROPHILS: 62 % (ref 36–65)
SEGMENTED NEUTROPHILS ABSOLUTE COUNT: 6.99 K/UL (ref 1.5–8.1)
SODIUM SERPL-SCNC: 133 MMOL/L (ref 135–144)
TROPONIN I SERPL DL<=0.01 NG/ML-MCNC: 6 NG/L (ref 0–14)
TROPONIN I SERPL DL<=0.01 NG/ML-MCNC: <6 NG/L (ref 0–14)
WBC # BLD AUTO: 11.2 K/UL (ref 3.5–11.3)

## 2023-02-12 PROCEDURE — 93010 ELECTROCARDIOGRAM REPORT: CPT | Performed by: INTERNAL MEDICINE

## 2023-02-12 RX ORDER — KETOROLAC TROMETHAMINE 10 MG/1
10 TABLET, FILM COATED ORAL EVERY 8 HOURS PRN
Qty: 15 TABLET | Refills: 0 | Status: SHIPPED | OUTPATIENT
Start: 2023-02-12 | End: 2023-02-13 | Stop reason: ALTCHOICE

## 2023-02-12 NOTE — ED NOTES
Dr. Jose Brown at bedside for results update. POC includes d/c home. Encouraged to f/u with PCP and Cardio, contact information provided.      Arnulfo Calle RN  02/12/23 4020

## 2023-02-12 NOTE — ED PROVIDER NOTES
HPI:  2/11/23,   Time: 11:02 PM PASHA Montero is a 47 y.o. female presenting to the ED for gradual onset of chest tightness substernal with some radiation to the back, beginning over the last few days but especially over the last few hours while at a wedding reception ago. The complaint has been constant, moderate in severity, and worsened by nothing. No alleviating factors. No fever chills night sweats no prior cardiac disease but states she had a normal stress test a few years ago    ROS:   Pertinent positives and negatives are stated within HPI, all other systems reviewed and are negative.  --------------------------------------------- PAST HISTORY ---------------------------------------------  Past Medical History:  has a past medical history of Hypertension, Morbid obesity with BMI of 45.0-49.9, adult ([facility] [address] MVP (mitral valve prolapse), and PONV (postoperative nausea and vomiting). Past Surgical History:  has a past surgical history that includes Cholecystectomy; Umbilical hernia repair (2003); Cystoscopy; Dilation and curettage of uterus; Endometrial ablation; hernia repair; Colonoscopy (11/15/2021); Colonoscopy (N/A, 11/15/2021); Hemorrhoid surgery (N/A, 11/15/2021); Ankle fracture surgery (Right); and Ankle fracture surgery (Right, 2/18/2022). Social History:  reports that she has never smoked. She has never used smokeless tobacco. She reports that she does not drink alcohol and does not use drugs. Family History: family history includes COPD in her mother; Heart Disease in her brother and father; High Blood Pressure in her sister; Pacemaker in her sister. The patients home medications have been reviewed. Allergies: Patient has no known allergies.     -------------------------------------------------- RESULTS -------------------------------------------------  All laboratory and radiology results have been personally reviewed by myself   LABS:  Results for orders placed or performed during the hospital encounter of 02/11/23   BMP   Result Value Ref Range    Glucose 109 (H) 70 - 99 mg/dL    BUN 15 6 - 20 mg/dL    Creatinine 0.81 0.50 - 0.90 mg/dL    Est, Glom Filt Rate >60 >60 mL/min/1.73m2    Bun/Cre Ratio 19 9 - 20    Calcium 10.3 8.6 - 10.4 mg/dL    Sodium 133 (L) 135 - 144 mmol/L    Potassium 3.8 3.7 - 5.3 mmol/L    Chloride 97 (L) 98 - 107 mmol/L    CO2 26 20 - 31 mmol/L    Anion Gap 10 9 - 17 mmol/L   CBC with Auto Differential   Result Value Ref Range    WBC 11.2 3.5 - 11.3 k/uL    RBC 4.36 3.95 - 5.11 m/uL    Hemoglobin 12.7 11.9 - 15.1 g/dL    Hematocrit 38.5 36.3 - 47.1 %    MCV 88.3 82.6 - 102.9 fL    MCH 29.1 25.2 - 33.5 pg    MCHC 33.0 28.4 - 34.8 g/dL    RDW 12.9 11.8 - 14.4 %    Platelets 759 857 - 507 k/uL    MPV 9.8 8.1 - 13.5 fL    NRBC Automated 0.0 0.0 per 100 WBC    Seg Neutrophils 62 36 - 65 %    Lymphocytes 29 24 - 43 %    Monocytes 7 3 - 12 %    Eosinophils % 1 1 - 4 %    Basophils 0 0 - 2 %    Immature Granulocytes 1 (H) 0 %    Segs Absolute 6.99 1.50 - 8.10 k/uL    Absolute Lymph # 3.26 1.10 - 3.70 k/uL    Absolute Mono # 0.74 0.10 - 1.20 k/uL    Absolute Eos # 0.13 0.00 - 0.44 k/uL    Basophils Absolute 0.03 0.00 - 0.20 k/uL    Absolute Immature Granulocyte 0.07 0.00 - 0.30 k/uL   D-Dimer, Quantitative   Result Value Ref Range    D-Dimer, Quant 0.46 0.00 - 0.59 mg/L FEU   Troponin   Result Value Ref Range    Troponin, High Sensitivity <6 0 - 14 ng/L   Troponin   Result Value Ref Range    Troponin, High Sensitivity 6 0 - 14 ng/L   EKG 12 Lead   Result Value Ref Range    Ventricular Rate 92 BPM    Atrial Rate 92 BPM    P-R Interval 130 ms    QRS Duration 98 ms    Q-T Interval 382 ms    QTc Calculation (Bazett) 472 ms    P Axis 48 degrees    R Axis 6 degrees    T Axis 24 degrees       RADIOLOGY:  Interpreted by Radiologist.  XR CHEST PORTABLE   Final Result   Mild diffuse interstitial prominence in the lungs, which could be secondary   to pulmonary edema or pneumonia.             ------------------------- NURSING NOTES AND VITALS REVIEWED ---------------------------   The nursing notes within the ED encounter and vital signs as below have been reviewed. /70   Pulse 84   Temp 97.8 °F (36.6 °C) (Oral)   Resp 16   Ht 5' (1.524 m)   Wt 248 lb (112.5 kg)   SpO2 96%   BMI 48.43 kg/m²   Oxygen Saturation Interpretation: Normal      ---------------------------------------------------PHYSICAL EXAM--------------------------------------      Constitutional/General: Alert and oriented x3  Head: NC/AT  Eyes: PERRL, EOMI  Mouth: Oropharynx clear, handling secretions, no trismus  Neck: Supple, full ROM, no meningeal signs  Pulmonary: Lungs clear to auscultation bilaterally, no wheezes, rales, or rhonchi. Not in respiratory distress  Cardiovascular:  Regular rate and rhythm, no murmurs, gallops, or rubs. 2+ distal pulses  Abdomen: Soft, non tender, non distended,   Extremities: Moves all extremities x 4. Warm and well perfused  Skin: warm and dry without rash  Neurologic: GCS 15,  Psych: Normal Affect      ------------------------------ ED COURSE/MEDICAL DECISION MAKING----------------------  Medications   nitroGLYCERIN (NITROSTAT) SL tablet 0.4 mg (0.4 mg SubLINGual Given 2/11/23 2313)   0.9 % sodium chloride bolus (0 mLs IntraVENous Stopped 2/12/23 0031)   aspirin chewable tablet 162 mg (162 mg Oral Given 2/11/23 2313)   ketorolac (TORADOL) injection 15 mg (15 mg IntraVENous Given 2/11/23 0452)     EKG: Normal sinus rhythm with rate of 92 bpm, no clear acute ischemic change or ectopy      Medical Decision Making:    Feeling much better after Toradol and no relief with nitroglycerin troponin negative x2 sets and EKG unremarkable    Counseling: The emergency provider has spoken with the patient and discussed todays results, in addition to providing specific details for the plan of care and counseling regarding the diagnosis and prognosis.   Questions are answered at this time and they are agreeable with the plan.      --------------------------------- IMPRESSION AND DISPOSITION ---------------------------------    IMPRESSION  1.  Atypical chest pain Stable       DISPOSITION  Disposition: Discharge to home  Patient condition is stable                  Inge Cabrera MD  02/12/23 9231

## 2023-02-13 ENCOUNTER — APPOINTMENT (OUTPATIENT)
Dept: CT IMAGING | Age: 53
End: 2023-02-13
Payer: COMMERCIAL

## 2023-02-13 ENCOUNTER — CARE COORDINATION (OUTPATIENT)
Dept: OTHER | Facility: CLINIC | Age: 53
End: 2023-02-13

## 2023-02-13 ENCOUNTER — APPOINTMENT (OUTPATIENT)
Dept: GENERAL RADIOLOGY | Age: 53
End: 2023-02-13
Payer: COMMERCIAL

## 2023-02-13 ENCOUNTER — HOSPITAL ENCOUNTER (EMERGENCY)
Age: 53
Discharge: HOME | End: 2023-02-13
Attending: STUDENT IN AN ORGANIZED HEALTH CARE EDUCATION/TRAINING PROGRAM
Payer: COMMERCIAL

## 2023-02-13 VITALS
OXYGEN SATURATION: 95 % | SYSTOLIC BLOOD PRESSURE: 135 MMHG | BODY MASS INDEX: 46.87 KG/M2 | HEART RATE: 91 BPM | DIASTOLIC BLOOD PRESSURE: 79 MMHG | WEIGHT: 240 LBS | RESPIRATION RATE: 15 BRPM | TEMPERATURE: 100.5 F

## 2023-02-13 DIAGNOSIS — R07.9 CHEST PAIN, UNSPECIFIED TYPE: Primary | ICD-10-CM

## 2023-02-13 LAB
ABSOLUTE EOS #: 0.13 K/UL (ref 0–0.44)
ABSOLUTE IMMATURE GRANULOCYTE: 0.07 K/UL (ref 0–0.3)
ABSOLUTE LYMPH #: 2.21 K/UL (ref 1.1–3.7)
ABSOLUTE MONO #: 0.79 K/UL (ref 0.1–1.2)
ANION GAP SERPL CALCULATED.3IONS-SCNC: 9 MMOL/L (ref 9–17)
BASOPHILS # BLD: 0 % (ref 0–2)
BASOPHILS ABSOLUTE: 0.03 K/UL (ref 0–0.2)
BUN SERPL-MCNC: 14 MG/DL (ref 6–20)
BUN/CREAT BLD: 22 (ref 9–20)
CALCIUM SERPL-MCNC: 9.7 MG/DL (ref 8.6–10.4)
CHLORIDE SERPL-SCNC: 106 MMOL/L (ref 98–107)
CO2 SERPL-SCNC: 27 MMOL/L (ref 20–31)
CREAT SERPL-MCNC: 0.65 MG/DL (ref 0.5–0.9)
D DIMER BLD IA.RAPID-MCNC: 0.46 MG/L FEU (ref 0–0.59)
EKG ATRIAL RATE: 87 BPM
EKG P AXIS: 44 DEGREES
EKG P-R INTERVAL: 148 MS
EKG Q-T INTERVAL: 356 MS
EKG QRS DURATION: 94 MS
EKG QTC CALCULATION (BAZETT): 428 MS
EKG R AXIS: 19 DEGREES
EKG T AXIS: 35 DEGREES
EKG VENTRICULAR RATE: 87 BPM
EOSINOPHILS RELATIVE PERCENT: 1 % (ref 1–4)
FLUAV AG SPEC QL: NEGATIVE
FLUBV AG SPEC QL: NEGATIVE
GFR SERPL CREATININE-BSD FRML MDRD: >60 ML/MIN/1.73M2
GLUCOSE SERPL-MCNC: 147 MG/DL (ref 70–99)
HCT VFR BLD AUTO: 36 % (ref 36.3–47.1)
HGB BLD-MCNC: 11.8 G/DL (ref 11.9–15.1)
IMMATURE GRANULOCYTES: 1 %
LYMPHOCYTES # BLD: 19 % (ref 24–43)
MCH RBC QN AUTO: 29.1 PG (ref 25.2–33.5)
MCHC RBC AUTO-ENTMCNC: 32.8 G/DL (ref 28.4–34.8)
MCV RBC AUTO: 88.9 FL (ref 82.6–102.9)
MONOCYTES # BLD: 7 % (ref 3–12)
NRBC AUTOMATED: 0 PER 100 WBC
PDW BLD-RTO: 13.2 % (ref 11.8–14.4)
PLATELET # BLD AUTO: 276 K/UL (ref 138–453)
PMV BLD AUTO: 9.8 FL (ref 8.1–13.5)
POTASSIUM SERPL-SCNC: 3.9 MMOL/L (ref 3.7–5.3)
RBC # BLD: 4.05 M/UL (ref 3.95–5.11)
SARS-COV-2 RDRP RESP QL NAA+PROBE: NOT DETECTED
SEG NEUTROPHILS: 72 % (ref 36–65)
SEGMENTED NEUTROPHILS ABSOLUTE COUNT: 8.66 K/UL (ref 1.5–8.1)
SODIUM SERPL-SCNC: 142 MMOL/L (ref 135–144)
SPECIMEN DESCRIPTION: NORMAL
TROPONIN I SERPL DL<=0.01 NG/ML-MCNC: <6 NG/L (ref 0–14)
WBC # BLD AUTO: 11.9 K/UL (ref 3.5–11.3)

## 2023-02-13 PROCEDURE — 87804 INFLUENZA ASSAY W/OPTIC: CPT

## 2023-02-13 PROCEDURE — 80048 BASIC METABOLIC PNL TOTAL CA: CPT

## 2023-02-13 PROCEDURE — 93005 ELECTROCARDIOGRAM TRACING: CPT | Performed by: STUDENT IN AN ORGANIZED HEALTH CARE EDUCATION/TRAINING PROGRAM

## 2023-02-13 PROCEDURE — 71045 X-RAY EXAM CHEST 1 VIEW: CPT

## 2023-02-13 PROCEDURE — 6370000000 HC RX 637 (ALT 250 FOR IP): Performed by: STUDENT IN AN ORGANIZED HEALTH CARE EDUCATION/TRAINING PROGRAM

## 2023-02-13 PROCEDURE — 6360000004 HC RX CONTRAST MEDICATION: Performed by: STUDENT IN AN ORGANIZED HEALTH CARE EDUCATION/TRAINING PROGRAM

## 2023-02-13 PROCEDURE — 85379 FIBRIN DEGRADATION QUANT: CPT

## 2023-02-13 PROCEDURE — 85025 COMPLETE CBC W/AUTO DIFF WBC: CPT

## 2023-02-13 PROCEDURE — 36415 COLL VENOUS BLD VENIPUNCTURE: CPT

## 2023-02-13 PROCEDURE — 71260 CT THORAX DX C+: CPT | Performed by: STUDENT IN AN ORGANIZED HEALTH CARE EDUCATION/TRAINING PROGRAM

## 2023-02-13 PROCEDURE — 99285 EMERGENCY DEPT VISIT HI MDM: CPT

## 2023-02-13 PROCEDURE — 84484 ASSAY OF TROPONIN QUANT: CPT

## 2023-02-13 PROCEDURE — 87635 SARS-COV-2 COVID-19 AMP PRB: CPT

## 2023-02-13 PROCEDURE — 93010 ELECTROCARDIOGRAM REPORT: CPT | Performed by: INTERNAL MEDICINE

## 2023-02-13 RX ORDER — ASPIRIN 81 MG/1
324 TABLET, CHEWABLE ORAL ONCE
Status: COMPLETED | OUTPATIENT
Start: 2023-02-13 | End: 2023-02-13

## 2023-02-13 RX ORDER — IBUPROFEN 400 MG/1
800 TABLET ORAL EVERY 8 HOURS PRN
Qty: 120 TABLET | Refills: 0 | Status: SHIPPED | OUTPATIENT
Start: 2023-02-13

## 2023-02-13 RX ORDER — NITROGLYCERIN 0.4 MG/1
0.4 TABLET SUBLINGUAL EVERY 5 MIN PRN
Status: DISCONTINUED | OUTPATIENT
Start: 2023-02-13 | End: 2023-02-13 | Stop reason: HOSPADM

## 2023-02-13 RX ORDER — FAMOTIDINE 20 MG/1
20 TABLET, FILM COATED ORAL 2 TIMES DAILY
Qty: 60 TABLET | Refills: 3 | Status: SHIPPED | OUTPATIENT
Start: 2023-02-13

## 2023-02-13 RX ORDER — ACETAMINOPHEN 325 MG/1
650 TABLET ORAL ONCE
Status: COMPLETED | OUTPATIENT
Start: 2023-02-13 | End: 2023-02-13

## 2023-02-13 RX ADMIN — IOPAMIDOL 75 ML: 755 INJECTION, SOLUTION INTRAVENOUS at 12:57

## 2023-02-13 RX ADMIN — NITROGLYCERIN 0.4 MG: 0.4 TABLET SUBLINGUAL at 11:03

## 2023-02-13 RX ADMIN — ACETAMINOPHEN 650 MG: 325 TABLET ORAL at 11:02

## 2023-02-13 RX ADMIN — ASPIRIN 324 MG: 81 TABLET, CHEWABLE ORAL at 11:02

## 2023-02-13 ASSESSMENT — PAIN DESCRIPTION - ORIENTATION: ORIENTATION: MID;UPPER

## 2023-02-13 ASSESSMENT — PAIN SCALES - GENERAL
PAINLEVEL_OUTOF10: 8
PAINLEVEL_OUTOF10: 3

## 2023-02-13 ASSESSMENT — PAIN - FUNCTIONAL ASSESSMENT: PAIN_FUNCTIONAL_ASSESSMENT: 0-10

## 2023-02-13 ASSESSMENT — PAIN DESCRIPTION - LOCATION
LOCATION: CHEST
LOCATION: CHEST

## 2023-02-13 ASSESSMENT — LIFESTYLE VARIABLES
HOW OFTEN DO YOU HAVE A DRINK CONTAINING ALCOHOL: NEVER
HOW MANY STANDARD DRINKS CONTAINING ALCOHOL DO YOU HAVE ON A TYPICAL DAY: PATIENT DOES NOT DRINK

## 2023-02-13 ASSESSMENT — HEART SCORE: ECG: 0

## 2023-02-13 ASSESSMENT — PAIN DESCRIPTION - DESCRIPTORS: DESCRIPTORS: TIGHTNESS;ACHING

## 2023-02-13 ASSESSMENT — PAIN DESCRIPTION - PAIN TYPE: TYPE: ACUTE PAIN

## 2023-02-13 ASSESSMENT — PAIN DESCRIPTION - FREQUENCY: FREQUENCY: CONTINUOUS

## 2023-02-13 NOTE — Clinical Note
Hugh Serrato was seen and treated in our emergency department on 2/13/2023. She may return to work on 02/16/2023. If you have any questions or concerns, please don't hesitate to call.       Barbara Kolb MD

## 2023-02-13 NOTE — ED PROVIDER NOTES
[facility] ED      EMERGENCY MEDICINE     Pt Name: Breanna Pearce  MRN: [id number]  [place] 1970  Date of evaluation: 2/13/2023  Provider: Sarika Ventura MD    CHIEF COMPLAINT       Chief Complaint   Patient presents with    Chest Pain     Onset Saturday with gradual onset, was seen in ED Saturday as well for same complaint, pain is worse, increased pain with deep breath, states cannot lay flat     HISTORY OF PRESENT ILLNESS   Breanna Pearce is a pleasant 47 y.o. female who presents to the emergency department for sharp chest pain over the last 10 days patient was recently seen here and evaluated a few days ago. Her pain improved translate but is worse in the last couple days. She was placed on Toradol for musculoskeletal like concerns for pain. She denies any associate nausea vomiting or diaphoresis with her pain. She denies any leg swelling that is new. Denies any recent travel on airplanes long car rides recent surgeries recent fractures. Denies any hormonal therapy.   Denies any history of DVT or PE          PASTMEDICAL HISTORY     Past Medical History:   Diagnosis Date    Hypertension     Morbid obesity with BMI of 45.0-49.9, adult (HCC)     MVP (mitral valve prolapse)     PONV (postoperative nausea and vomiting)     after gallbladder surgery       Patient Active Problem List   Diagnosis Code    Mixed hyperlipidemia E78.2    Benign essential HTN I10    External hemorrhoid K64.4    Morbid obesity with BMI of 45.0-49.9, adult ([facility] [address] E66.01, Z68.42    Grade III hemorrhoids K64.2     SURGICAL HISTORY       Past Surgical History:   Procedure Laterality Date    ANKLE FRACTURE SURGERY Right     ANKLE FRACTURE SURGERY Right 2/18/2022    ANKLE OPEN REDUCTION INTERNAL FIXATION-DISTAL FIBULA FRACTURE WITH SYNDESMOSIS FIXATION performed by Chica Kaur MD at [facility]  11/15/2021    COLONOSCOPY N/A 11/15/2021    COLONOSCOPY DIAGNOSTIC performed by Maci Leroy DO at [address] [place] [address]      Hysteroscopy and Endometrial Ablation    ENDOMETRIAL ABLATION      HEMORRHOID SURGERY N/A 11/15/2021    HEMORRHOIDECTOMY performed by Maci Leroy DO at [facility]       Previous Medications    ENALAPRIL (VASOTEC) 10 MG TABLET    Take 1 tablet by mouth daily    HYDROCHLOROTHIAZIDE (HYDRODIURIL) 25 MG TABLET    Take 1 tablet by mouth every morning    POLYETHYLENE GLYCOL (GLYCOLAX) 17 G PACKET    Take 17 g by mouth daily as needed for Constipation    SIMVASTATIN (ZOCOR) 40 MG TABLET    Take 1 tablet by mouth every evening    VERAPAMIL (VERELAN) 360 MG EXTENDED RELEASE CAPSULE    Take 1 capsule by mouth daily       ALLERGIES     has No Known Allergies. FAMILY HISTORY     She indicated that her mother is alive. She indicated that her father is alive. She indicated that the status of her sister is unknown. She indicated that the status of her brother is unknown. SOCIAL HISTORY       Social History     Tobacco Use    Smoking status: Never    Smokeless tobacco: Never   Vaping Use    Vaping Use: Never used   Substance Use Topics    Alcohol use: No     Comment: rare    Drug use: No       PHYSICAL EXAM       ED Triage Vitals [02/13/23 1039]   BP Temp Temp Source Heart Rate Resp SpO2 Height Weight   (!) 163/79 (!) 100.5 °F (38.1 °C) Tympanic 83 20 96 % -- 240 lb (108.9 kg)       Physical Exam  Vitals and nursing note reviewed. Constitutional:       General: She is not in acute distress. Appearance: She is obese. She is not toxic-appearing. HENT:      Head: Normocephalic and atraumatic. Right Ear: External ear normal.      Left Ear: External ear normal.      Nose: Nose normal.      Mouth/Throat:      Mouth: Mucous membranes are moist.      Pharynx: Oropharynx is clear.    Eyes:      General: No scleral icterus. Conjunctiva/sclera: Conjunctivae normal.   Cardiovascular:      Rate and Rhythm: Normal rate and regular rhythm. Pulses: Normal pulses. Heart sounds: Normal heart sounds. No murmur heard. No friction rub. No gallop. Pulmonary:      Effort: Pulmonary effort is normal. No respiratory distress. Breath sounds: Normal breath sounds. Abdominal:      General: Abdomen is flat. There is no distension. Palpations: Abdomen is soft. Tenderness: There is no abdominal tenderness. There is no guarding or rebound. Musculoskeletal:         General: Normal range of motion. Cervical back: Normal range of motion and neck supple. No rigidity. No muscular tenderness. Lymphadenopathy:      Cervical: No cervical adenopathy. Skin:     General: Skin is warm and dry. Capillary Refill: Capillary refill takes less than 2 seconds. Coloration: Skin is not jaundiced. Neurological:      General: No focal deficit present. Mental Status: She is alert and oriented to person, place, and time. Psychiatric:         Mood and Affect: Mood normal.         Behavior: Behavior normal.         FORMAL DIAGNOSTIC RESULTS     RADIOLOGY: Interpretation per the Radiologist below, if available at the time of this note (none if blank):    CT CHEST PULMONARY EMBOLISM W CONTRAST   Preliminary Result   1. No evidence of pulmonary embolism or acute pulmonary abnormality. 2.  Trace pericardial effusion. 3.  Calcified coronary atheromatous plaque. XR CHEST PORTABLE   Final Result   No acute cardiopulmonary disease. Similar interstitial prominence.              LABS: (none if blank)  Labs Reviewed   BASIC METABOLIC PANEL - Abnormal; Notable for the following components:       Result Value    Glucose 147 (*)     Bun/Cre Ratio 22 (*)     All other components within normal limits   CBC WITH AUTO DIFFERENTIAL - Abnormal; Notable for the following components:    WBC 11.9 (*) Hemoglobin 11.8 (*)     Hematocrit 36.0 (*)     Seg Neutrophils 72 (*)     Lymphocytes 19 (*)     Immature Granulocytes 1 (*)     Segs Absolute 8.66 (*)     All other components within normal limits   RAPID INFLUENZA A/B ANTIGENS   COVID-19, RAPID   TROPONIN   D-DIMER, QUANTITATIVE       (Any cultures that may have been sent were not resulted at the time of this patient visit)    [address] / ED COURSE:     External Documentation Reviewed:         Previous patient encounter documents & history available on EMR was reviewed: Patient was seen here on 2/11/2023 for atypical chest pain. 1)           Differential Diagnosis includes (but not limited to):  ACS, musculoskeletal, pericarditis, myocarditis        Diagnoses Considered but I have low suspicion of:   Pulmonary [place]       Decision Rules/Clinical Scores utilized:      Heart Score    Heart Score for chest pain patients  History: Slightly Suspicious  ECG: Normal  Patient Age: > 39 and < 65 years  *Risk factors for Atherosclerotic disease: Obesity, Positive family History, Hypercholesterolemia  Risk Factors: > 3 Risk factors or history of atherosclerotic disease*  Troponin: < 1X normal limit  Heart Score Total: 3    HEART Score recommendations:  Score 0 - 3:   <1.7%  risk of MACE over next 6 wks = Outpatient workup  Score 4 - 6: 12-16% risk of MACE over next 6 wks = Admission for observation  Score 7- 10: 50-65% risk of MACE over next 6 wks = Early invasive strategy    MACE: Major Acute Cardiac Event (All Cause Mortality, AMI or revascularization)  Chest Pain in the Emergency Room: A Multicenter Validation of the [address]. Saritha BE, Toney AJ, [place] JULIANNA, Marielena TP, [employer], Marielena EG, Bryant SH, [facility]. Crit Pathw Cardiol. 2010 Sep; 9(3): 164-169. \"A prospective validation of the HEART score for chest pain patients at the emergency department. \" Int J Cardiol.  2013 Oct 3;168(3):2153-8. doi: 10.1016/j.ijcard. 2013.01.255. Epub 2013 Mar 7. See Formal Diagnostic Results above for the lab and radiology tests and orders. 3)  Treatment and Disposition         ED Reassessment:  See ED course         Case discussed with consulting clinician:  Dr Kamar Durham cardiology          Shared Decision-Making was performed and disposition discussed with the        Patient/Family and questions answered          Social determinants of health impacting treatment or disposition:  none      Summary of Patient Presentation:      ED Course as of 02/13/23 1417   Mon Feb 13, 2023   1058 EKG 12 Lead  Normal sinus rhythm ventricular rate 87, NH interval 148, QRS 94, QTc 428, no significant ST elevation or ST depression, no Wellens no Brugada's no de Jack. [AL]   1103 XR CHEST PORTABLE  \"   IMPRESSION:  No acute cardiopulmonary disease. Similar interstitial prominence. '\" [AL]   1140 SARS-CoV-2, Rapid: Not Detected [AL]   1140 Flu A Antigen: NEGATIVE [AL]   1140 Flu B Antigen: NEGATIVE [AL]   1140 Troponin, High Sensitivity: <6 [AL]   1140 WBC(!): 11.9 [AL]   1140 Hemoglobin Quant(!): 11.8 [AL]   1140 Platelet Count: 596 [AL]   1141 Sodium: 142 [AL]   1141 Potassium: 3.9 [AL]   1212 D-Dimer, Quant: 0.46 [AL]   1221 Pain is still present however improved prolonged discussion occurred with patient regarding potentially still doing a CTA for further restratification evaluation she agrees with this plan. A CTA will be ordered. [AL]   1314 CT CHEST PULMONARY EMBOLISM W CONTRAST  \"IMPRESSION:  1. No evidence of pulmonary embolism or acute pulmonary abnormality. 2.  Trace pericardial effusion. 3.  Calcified coronary atheromatous plaque. \" [AL]   9012 Updated on her laboratory studies as well as imaging results her pain is improved. We will contact the on-call cardiologist for further discussions of her CTA findings. [AL]   [id number] Spoke with Dr. Kamar Durham, who agrees with work-up and follow-up cardiology outpatient. Recommended switching patient off of Toradol to Motrin. [AL]      ED Course User Index  [AL] Kim Franks MD         Medical Decision Making  Amount and/or Complexity of Data Reviewed  Labs: ordered. Decision-making details documented in ED Course. Radiology: ordered. Decision-making details documented in ED Course. ECG/medicine tests: ordered. Decision-making details documented in ED Course. Risk  OTC drugs. Prescription drug management. This 81-year-old female has been having sharp lower chest pain over the last week or so. With a viral prodromal illness approximately a few weeks ago. Her troponin is negative. EKG shows no signs of infarction or ischemia. CT does show some trace pericardial effusion but her pressures are stable. No hemodynamic concerns at this time. Discussed the case with the on-call cardiologist for concerns of possible pericarditis contributing to this. He agrees and advised her to Toradol instead placing her on Motrin. As well as a PPI. And following up outpatient. Vitals Reviewed:    Vitals:    02/13/23 1156 02/13/23 1215 02/13/23 1230 02/13/23 1245   BP: (!) 164/82 139/79 128/72    Pulse: 85 86 90    Resp: 18 17 23    Temp:       TempSrc:       SpO2: 94%  97% 98%   Weight:           The patient was seen and examined. Appropriate diagnostic testing was performed and results reviewed with the patient. The results of pertinent diagnostic studies and exam findings were discussed. The patients provisional diagnosis and plan of care were discussed with the patient and present family who expressed understanding. Any medications were reviewed and indications and risks of medications were discussed with the patient /family present. Strict verbal and written return precautions, instructions and appropriate follow-up provided to  the patient .      ED Medications administered this visit:  (None if blank)  Medications   nitroGLYCERIN (NITROSTAT) SL tablet 0.4 mg (0.4 mg SubLINGual Given 2/13/23 1103)   aspirin chewable tablet 324 mg (324 mg Oral Given 2/13/23 1102)   acetaminophen (TYLENOL) tablet 650 mg (650 mg Oral Given 2/13/23 1102)   iopamidol (ISOVUE-370) 76 % injection 75 mL (75 mLs IntraVENous Given 2/13/23 1257)         PROCEDURES: (None if blank)  Procedures:     CRITICAL CARE: (None if blank)      DISCHARGE PRESCRIPTIONS: (None if blank)  New Prescriptions    FAMOTIDINE (PEPCID) 20 MG TABLET    Take 1 tablet by mouth 2 times daily    IBUPROFEN (IBU) 400 MG TABLET    Take 2 tablets by mouth every 8 hours as needed for Pain       FINAL IMPRESSION      1.  Chest pain, unspecified type          DISPOSITION/PLAN   DISPOSITION Decision To Discharge 02/13/2023 02:11:01 PM      OUTPATIENT FOLLOW UP THE PATIENT:  Syed Jean MD  [address] [place] [address] Dr Andreina Otero [place] [address] [phone]    Schedule an appointment as soon as possible for a visit in 5 days      MD Amy Suarez MD  Resident  02/13/23 6886

## 2023-02-13 NOTE — ED NOTES
Contacted Dr. Nikita Ontiveros per Dr. Arabella Campbell request.     Obie Handing A Reser  02/13/23 8867

## 2023-02-13 NOTE — DISCHARGE INSTRUCTIONS
Follow-up with your cardiologist as directed. Return to the emergency room if you develop any new or worsening symptoms. Stop taking your Tylenol and take the Motrin instead.

## 2023-02-13 NOTE — ED NOTES
Dr Bard Fonseca returned call. Currently speaking with Dr. Fabiola Messina.      [facility] Reser  02/13/23 1319

## 2023-02-13 NOTE — CARE COORDINATION
Ambulatory Care Coordination Note  2/13/2023    ACM attempted to reach patient for introduction to Associate Care Management related to ED visit on 2/11/23. HIPAA compliant message left requesting a return phone call. Will attempt to outreach patient again. Future Appointments   Date Time Provider Arely Trujillo   2/27/2023  8:00 AM [place], [place] TIFF CARD [facility]   9/11/2023  8:00 AM [facility] Maxx Sahni MSN, RN   Ambulatory Care Manager  Associate Care Management  Cell [phone]  [email or web address]

## 2023-02-14 ENCOUNTER — CARE COORDINATION (OUTPATIENT)
Dept: OTHER | Facility: CLINIC | Age: 53
End: 2023-02-14

## 2023-02-14 NOTE — CARE COORDINATION
Ambulatory Care Coordination Note  2023    Patient Current Location:  Home: [address] [address]    ACM contacted the patient by telephone. Verified name and  with patient as identifiers. Provided introduction to self, and explanation of the ACM role. ACM: Marta Noguera RN    Challenges to be reviewed by the provider   Additional needs identified to be addressed with provider: No  none               Method of communication with provider: none. Ambulatory Care Manager [facility]) contacted the patient to introduce the Associate Care Management Program related to ED visits on 23 and 23. ACM reviewed and updated CC Protocol , medication , and goals patient was agreeable to follow up Care Management calls. Summary Note: Pt states she had reached out to PCP and Dr. Noa Montez office yesterday and was told to return to ED for c/o chest pain. Pt reports she is doing much better today and chest pain in under control; denies any chest pain today or shortness of breath, cough, palpitations, nausea/vomiting or headache. Pt reports she does have some dizziness today; encouraged pt to change positions slowly and proper hydration. Pt reports she does have fever of 102.2 currently, but denies any other symptoms. Educated pt to take acetaminophen or ibuprofen. Pt states that her  has been sick recently and has a cough. Encouraged pt to get f/u appt with pcp or to use [employer] online visit. Pt very appreciative of ACM outreach; Pt denies any further questions, needs or concerns; is agreeable to ACM follow up outreach. Provided Education:  Discussed red flags and appropriate site of care based on symptoms and resources available including: PCP  Specialist  Benefits related nurse triage line  When to call [phone]  [employer] online . Importance and benefits of: Follow up with PCP and specialist, medication adherence, self monitoring and reporting of symptoms. Plan:  ACM provided contact information for future needs. Plan for follow-up call in 7-10 days based on severity of symptoms and risk factors. Plan for next call: Review and update CC Protocol , medication , goals, and education  symptom management-chest pain, fever  self management-appetite/hydration  follow-up appointment-did pt make appt with pcp  medication management-any questions or changes with meds        Ambulatory Care Coordination Assessment    Care Coordination Protocol  Referral from Primary Care Provider: No  Week 1 - Initial Assessment     Do you have all of your prescriptions and are they filled?: Yes  Barriers to medication adherence: None  Are you able to afford your medications?: Yes  How often do you have trouble taking your medications the way you have been told to take them?: I always take them as prescribed. Do you have Home O2 Therapy?: No      Ability to seek help/take action for Emergent Urgent situations i.e. fire, crime, inclement weather or health crisis. : Independent  Ability to ambulate to restroom: Independent  Ability handle personal hygeine needs (bathing/dressing/grooming): Independent  Ability to manage Medications: Independent  Ability to prepare Food Preparation: Independent  Ability to maintain home (clean home, laundry): Independent  Ability to drive and/or has transportation: Independent  Ability to do shopping: Independent  Ability to manage finances: Independent  Is patient able to live independently?: Yes     Current Housing: Private Residence                 How wells does the patient now understand their health and well-being (symptoms, signs or risk factors) and what they need to do to manage their health?: Reasonable to good understanding and already engages in managing health or is willing to undertake better management   How well do you think your patient can engage in healthcare discussions?  (Barriers include language, deafness, aphasia, alcohol or drug problems, learning difficulties, concentration): Clear and open communication, no identified barriers   Do other services need to be involved to help this patient?: Other care/services not required at this time   Suggested Interventions and [employer]   Specialty Service Referral: Completed   Other Services: Completed         Set up/Review Goals              Future Appointments   Date Time Provider Arely Trujillo   2/27/2023  8:00 AM Alistair Almonte PA-C TIFF CARD [facility]   9/11/2023  8:00 AM MD Jeanne Simon      and   General Assessment    Do you have any symptoms that are causing concern?: Yes  Progression since Onset: Gradually Improving  Reported Symptoms: Fever, Chest Pain (Comment: Pt reports chest pain is better and 'under control'; states has 102.2 fever)

## 2023-02-14 NOTE — CARE COORDINATION
Ambulatory Care Coordination Note  2/14/2023    AC attempted 2nd outreach to reach patient for introduction to Associate Care Management. HIPAA compliant message left requesting a return phone call at patients convenience. Unable to Reach Letter sent to patient via my chart. Will continue to outreach patient. Future Appointments   Date Time Provider Arely Trujillo   2/27/2023  8:00 AM eDjon Sheets TIFF CARD [facility]   9/11/2023  8:00 AM MD Jeanne Vergara MSN, RN   Ambulatory Care Manager  Associate Care Management  Cell [phone]  [email or web address]

## 2023-02-21 ENCOUNTER — CARE COORDINATION (OUTPATIENT)
Dept: OTHER | Facility: CLINIC | Age: 53
End: 2023-02-21

## 2023-02-21 NOTE — CARE COORDINATION
Ambulatory Care Coordination Note  2023    Patient Current Location:  Home: [address] [address]     ACM contacted the patient by telephone. Verified name and  with patient as identifiers. Challenges to be reviewed by the provider   Additional needs identified to be addressed with provider: No  none               Method of communication with provider: none. ACM: Martha Swanson RN    ACM contacted the patient to follow up on progress, discuss new issues or concerns, and reinforce/ provide patient education. Summary Note: Pt reports she continues to have intermittent chest pain, which she states is minor; denies any associated diaphoresis, radiating pain, palpitations, dizziness, shortness of breath, nausea/vomiitng; pt denies any fever/chills, congestion, or diarrhea. Pt states she has intermittent non-productive cough; encouraged pt to perform deep breathing exercises and if cough becomes productive of yellow/green sputum to contact provider. ACM educated pt on Life Matters program and sent handout to pt via my chart. Pt denies any changes in meds; states she is taking all meds and denies any questions or need for refills. Advised obtaining 90 day refill on all on-going meds; pt states she utilized [facility] mail in pharmacy. Pt denies any further questions, needs or concerns; is agreeable to [facility] follow up outreach. Reinforced/ Provided Education:  Discussed red flags and appropriate site of care based on symptoms and resources available to patient including: PCP  Specialist  Benefits related nurse triage line  When to call [address] . Importance and benefits of: Follow up with PCP and specialist, medication adherence, self monitoring and reporting of symptoms. Plan:  Plan for follow-up call in 10-14 days based on severity of symptoms and risk factors.   Plan for next call: symptom management-chest pain  follow-up appointment-Cardiology on 2/27/23  medication management-any new or changed meds    Pt verbalized understanding and is agreeable to follow up contact. Care Coordination Interventions    Referral from Primary Care Provider: No  Suggested Interventions and Community Resources  Behavorial Health: Completed (Comment: Discussed and sent pt handout for [facility] [employer])  Specialty Services Referral: Completed (Comment: Referred to Cardiology; appt on 2/27/23)  Other Services: Completed (Comment: ACM educated pt on red flags/appropriate sites of care, including PCP, specialist, [facility] online virtual visits, nurse access line, when to go to urgent care/ED and when to call 911 for life threatening emergencies and pt verbalized understanding)          Goals Addressed                   This Visit's Progress     Conditions and Symptoms   On track     I will schedule office visits, as directed by my provider. I will keep my appointment or reschedule if I have to cancel. I will notify my provider of any barriers to my plan of care. I will notify my provider of any symptoms that indicate a worsening of my condition.     Barriers: lack of education  Plan for overcoming my barriers: work with [employer]  Confidence: 8/10  Anticipated Goal Completion Date: 4/1/23 2/14/23- Pt has f/u appt with cardiology on 2/27/23; encouraged pt to schedule f/u appt with pcp  2/21/23- Pt has cardiology f/u appt on 2/27/23; has not scheduled f/u appt with pcp            Education Documentation  Educate reporting changes in condition, taught by Rakesh Pruitt RN at 2/21/2023  8:25 AM.  Learner: Patient  Readiness: Acceptance  Method: Explanation  Response: Verbalizes Understanding  Comment: ACM educated pt on red flags/appropriate sites of care, including PCP, specialist, [facility] online virtual visits, nurse access line, when to go to urgent care/ED and when to call 911 for life threatening emergencies and pt verbalized understanding    Educate Patient on When to Call for Symptoms, taught by Josiah Gilford, RN at 2/21/2023  8:25 AM.  Learner: Patient  Readiness: Acceptance  Method: Explanation  Response: Verbalizes Understanding  Comment: ACM educated pt on red flags/appropriate sites of care, including PCP, specialist, [facility] online virtual visits, nurse access line, when to go to urgent care/ED and when to call 911 for life threatening emergencies and pt verbalized understanding    Educate caregiver effective coping behavior, taught by Josiah Gilford, RN at 2/21/2023  8:24 AM.  Learner: Patient  Readiness: Acceptance  Method: Explanation  Response: Verbalizes Understanding  Comment: Educated pt on  coping techniques and on Life Matters program; pt verbalized understanding    Education Comments  No comments found. Future Appointments   Date Time Provider Arely Trujillo   2/27/2023  8:00 AM Dejon Pelaez TIFF CARD [facility]   9/11/2023  8:00 AM MD Jeanne Landeros, RN   Ambulatory Care Manager  Associate Care Management  Cell [phone]  [email or web address]

## 2023-02-27 ENCOUNTER — OFFICE VISIT (OUTPATIENT)
Dept: CARDIOLOGY | Age: 53
End: 2023-02-27
Payer: COMMERCIAL

## 2023-02-27 VITALS
RESPIRATION RATE: 16 BRPM | WEIGHT: 258.4 LBS | OXYGEN SATURATION: 98 % | DIASTOLIC BLOOD PRESSURE: 89 MMHG | SYSTOLIC BLOOD PRESSURE: 168 MMHG | HEART RATE: 70 BPM | BODY MASS INDEX: 50.73 KG/M2 | HEIGHT: 60 IN

## 2023-02-27 DIAGNOSIS — I31.39 PERICARDIAL EFFUSION WITHOUT CARDIAC TAMPONADE: ICD-10-CM

## 2023-02-27 DIAGNOSIS — E78.2 MIXED HYPERLIPIDEMIA: ICD-10-CM

## 2023-02-27 DIAGNOSIS — R00.2 PALPITATIONS: ICD-10-CM

## 2023-02-27 DIAGNOSIS — R07.89 ATYPICAL CHEST PAIN: Primary | ICD-10-CM

## 2023-02-27 DIAGNOSIS — R42 DIZZINESS: ICD-10-CM

## 2023-02-27 DIAGNOSIS — R42 LIGHTHEADED: ICD-10-CM

## 2023-02-27 DIAGNOSIS — I10 PRIMARY HYPERTENSION: ICD-10-CM

## 2023-02-27 DIAGNOSIS — E66.01 MORBID OBESITY (HCC): ICD-10-CM

## 2023-02-27 DIAGNOSIS — I34.1 MITRAL VALVE PROLAPSE: ICD-10-CM

## 2023-02-27 PROCEDURE — 93000 ELECTROCARDIOGRAM COMPLETE: CPT | Performed by: FAMILY MEDICINE

## 2023-02-27 PROCEDURE — 3079F DIAST BP 80-89 MM HG: CPT | Performed by: PHYSICIAN ASSISTANT

## 2023-02-27 PROCEDURE — 99204 OFFICE O/P NEW MOD 45 MIN: CPT | Performed by: PHYSICIAN ASSISTANT

## 2023-02-27 PROCEDURE — 3077F SYST BP >= 140 MM HG: CPT | Performed by: PHYSICIAN ASSISTANT

## 2023-02-27 NOTE — PROGRESS NOTES
Berlin Gay am scribing for and in the presence of Juliana Ramsay PA-C. Patient: Janina Bishop  : 1970  Date of Visit: 2023    REASON FOR VISIT / CONSULTATION: Establish Cardiologist (Hx: CP,HTN,HLD,Obesity. ER follow up on  &  for CP. CP started 2-3 weeks ago, Sharp, Hours to days. No further episodes since she was in ER, was started on medication. No cardiac history that she is aware of. Lightheaded/dizziness on occasion, passes quickly. Palpitations from time to time, happen randomly. //Denies: SOB. )      History of Present Illness:        Dear Joss Ching MD    I had the pleasure of seeing Ms. Gill today who is a 47 y.o. female who presents for evaluation of chest pain. Past cardiac medical history consist of hypertension for years and has been on medication. She does have hyperlipidemia and is on simvastatin. She denies history of thyroid disease or diabetes. No history of syncopal episodes. she is a nonsmoker. Family cardiac history consist of her brother who had heart attack at age 44, her dad had 2 heart attacks, starting in his 52's - both have had open heart and stents placed. Her sister who has pacemaker as well. Chest CT  2023: Calcified coronary atheromatous plaque    EKG done in office today 2023: Normal Sinus Rhythm, heart rate 66 bpm.    Ms. Ca Murrell is here today to establish care after recently being seen in the ER due to chest pain. She reports she started having chest pain that felt sharp/tight, located in the center and to the right side of her chest. She reports it did radiate to her neck. No sweating, nausea, or vomiting present. There is nothing that brings on her chest pain, when she was given nitroglycerin/toradol and ibuprofen and that helped her chest pain. She has not had any further episodes of chest pain since being seen in the ER. She has lightheaded/dizziness a couple times a week.  It happens randomly. No syncopal or near syncopal episodes. She does have a cough that is lingering for the last couple weeks. No shortness of breath    No abdominal pain, bleeding problems, bowel issues, problems with her medications or any other concerns at this time. Exercise Tolerance: Ms. Sachi Munoz reports that she has a fairly good exercise tolerance. Her says that she could walk 1 mile without developing chest discomfort or significant shortness of breath. She enjoys walking the loop at [facility] and can get around it at least twice and tried to do this 3 times per week. PAST MEDICAL HISTORY:        Past Medical History:   Diagnosis Date    Hypertension     Morbid obesity with BMI of 45.0-49.9, adult (HCC)     MVP (mitral valve prolapse)     PONV (postoperative nausea and vomiting)     after gallbladder surgery       CURRENT ALLERGIES: Patient has no known allergies. REVIEW OF SYSTEMS: 14 systems were reviewed. Pertinent positives and negatives as above, all else negative.      Past Surgical History:   Procedure Laterality Date    ANKLE FRACTURE SURGERY Right     ANKLE FRACTURE SURGERY Right 2/18/2022    ANKLE OPEN REDUCTION INTERNAL FIXATION-DISTAL FIBULA FRACTURE WITH SYNDESMOSIS FIXATION performed by Stephan Bourne MD at [address] [facility]  11/15/2021    COLONOSCOPY N/A 11/15/2021    COLONOSCOPY DIAGNOSTIC performed by Meryl Moses DO at [address] [place]      Hysteroscopy and Endometrial Ablation    ENDOMETRIAL ABLATION      HEMORRHOID SURGERY N/A 11/15/2021    HEMORRHOIDECTOMY performed by Meryl Moses DO at [address] [facility]  [facility]    Social History:  Social History     Tobacco Use    Smoking status: Never    Smokeless tobacco: Never   Vaping Use    Vaping Use: Never used   Substance Use Topics    Alcohol use: No     Comment: rare    Drug use: No        CURRENT MEDICATIONS:        Outpatient Medications Marked as Taking for the 2/27/23 encounter (Office Visit) with Alysa Mueller PA-C   Medication Sig Dispense Refill    FIBER ADULT GUMMIES PO Take by mouth daily      ibuprofen (IBU) 400 MG tablet Take 2 tablets by mouth every 8 hours as needed for Pain 120 tablet 0    polyethylene glycol (GLYCOLAX) 17 g packet Take 17 g by mouth daily as needed for Constipation      enalapril (VASOTEC) 10 MG tablet Take 1 tablet by mouth daily 90 tablet 3    hydroCHLOROthiazide (HYDRODIURIL) 25 MG tablet Take 1 tablet by mouth every morning 90 tablet 3    simvastatin (ZOCOR) 40 MG tablet Take 1 tablet by mouth every evening 90 tablet 3    verapamil (VERELAN) 360 MG extended release capsule Take 1 capsule by mouth daily 90 capsule 3       FAMILY HISTORY: family history includes COPD in her mother; Heart Disease in her brother and father; High Blood Pressure in her sister; Pacemaker in her sister. Physical Examination:     BP (!) 168/89 (Site: Right Lower Arm, Position: Sitting, Cuff Size: Medium Adult)   Pulse 70   Resp 16   Ht 5' (1.524 m)   Wt 258 lb 6.4 oz (117.2 kg)   SpO2 98%   BMI 50.47 kg/m²  Body mass index is 50.47 kg/m². Constitutional: She appeared oriented to person and place. She appears well-developed and well-nourished. In no acute distress. HEENT: Normocephalic and atraumatic. No JVD present. Carotid bruit is not present. No mass and no thyromegaly present. No lymphadenopathy noted. Cardiovascular: Normal rate, regular rhythm, normal heart sounds. Exam reveals no gallop and no friction rubs. 1/6 systolic murmur, 2nd intercostal space on the LEFT just lateral to the sternum  Pulmonary/Chest: Effort normal and breath sounds normal. No respiratory distress. She has no wheezes, rhonchi or rales. Abdominal: Soft, non-tender. She exhibits no organomegaly, mass or bruit. Extremities: None. No cyanosis or clubbing. 2+ radial and carotid pulses. Distal extremity pulses: 2+ bilaterally. Neurological: Alertness and orientation as per Constitutional exam. No evidence of gross cranial nerve deficit. Coordination appeared normal.   Skin: Skin is warm and dry. There is no rash or diaphoresis. Psychiatric: She has a normal mood and affect. Her speech is normal and behavior is normal.      MOST RECENT LABS ON RECORD:   Lab Results   Component Value Date    WBC 11.9 (H) 02/13/2023    HGB 11.8 (L) 02/13/2023    HCT 36.0 (L) 02/13/2023     02/13/2023    CHOL 261 (H) 08/21/2013    TRIG 374 (H) 08/21/2013    HDL 58 07/16/2021    ALT 27 07/16/2021    AST 22 07/16/2021     02/13/2023    K 3.9 02/13/2023     02/13/2023    CREATININE 0.65 02/13/2023    BUN 14 02/13/2023    CO2 27 02/13/2023    GLUF 105 (H) 07/16/2021       ASSESSMENT:     1. Atypical chest pain    2. Pericardial effusion without cardiac tamponade    3. Lightheaded    4. Dizziness    5. Palpitations    6. Mitral valve prolapse    7. Primary hypertension    8. Mixed hyperlipidemia    9. Morbid obesity ([address]       PLAN:      Atypical chest pain but still suspicious for possible myocardial ischemia:  Medical management for possible Coronary artery disease:  Antiplatelet Agent: Not indicated at this time. Beta Blocker: Not indicated at this time. Anti-anginal medications: Not indicated at this time. Cholesterol Reduction Therapy: Continue simvastatin (Zocor) 40 mg daily. Additional Testing List: I ordered a echocardiogram to better assess for the etiology of this problem and to help guide future management and Because of current signs and symptoms which can certainly be caused by significant coronary artery disease, I ordered a treadmill stress test with SPECT imaging to try and rule out this possibility. I also ordered a hemoglobin A1c to assess for diabetes since she was prediabetic at one point in addition to a repeat lipid panel for risk assesment.    Additional counseling: I advised them to call our office or go to the emergency room if they developed worsening or persistent chest pain or increased shortness of breath as this could be life threatening. Trace Pericardial Effusion: Seen on CT 2/16/2023  Echo as above. Lightheadedness/dizziness:  Pharmacologic Therapy: Not indicated at this time. Nonpharmacologic counseling: Because of her condition, I reminded her to try and keep herself well-hydrated and to take extra time when moving from laying to sitting, sitting to standing and standing to walking. I also explained to her to help improve her symptoms she should include 3 g sodium diet, 1 or 2 L of sports drinks daily, knee-high compressions stockings. Additional Testing List: I ordered a echocardiogram to better assess for the etiology of this problem and to help guide future management    Recurrent intermittent palpitations:  Asymptomatic  Beta Blocker: Not indicated at this time. Calcium Channel Blocker: Continue verapamil 360 mg once daily. History of Mitral Valve Prolapse:   Testing: Echo as listed above     Essential Hypertension: Borderline controlled  Beta Blocker: Not indicated at this time. ACE Inibitor/ARB: Not indicated at this time. Calcium Channel Blocker: Continue verapamil 360 mg once daily. Diuretics: Continue Hydrochlorothiazide (HCTZ) 25 mg every morning. She reports her blood pressure typically runs normal at home in the 130's/80's. I want her to monitor her blood pressures at home for a week and call me with an update, we can make medicine changes as needed     Hyperlipidemia: Mixed - Last LDL on 7/16/2021 was 87 mg/dL  Cholesterol Reduction Therapy: Continue simvastatin (Zocor) 40 mg daily. Testing: Lipid panel ordered to assess cholesterol level     Morbid obesity: Body mass index is 50.47 kg/m². I also briefly discussed both diet and exercise strategies for her to continue to loses weight and she was very receptive to this.     In the meantime, I encouraged Ms. Gill to continue to take her other medications. FOLLOW UP:   I told Ms. Gill to call my office if she had any problems, but otherwise I asked her to Return in about 6 weeks (around 4/10/2023). However, I would be happy to see her sooner should the need arise. Sincerely,  Abiola Lovett PA-C  [facility] Cardiology Specialist     [facility], [address] [place] [address]  Phone: [phone], Fax: [phone]     I believe that the risk of significant morbidity and mortality related to the patient's current medical conditions are: intermediate-high. Approximately 50 minutes were spent during prep work, discussion and exam of the patient, and follow up documentation and all of their questions were answered. The documentation recorded by the scribe, accurately and completely reflects the services I personally performed and the decisions made by me.  Abiola Lovett PA-C February 27, 2023

## 2023-02-27 NOTE — PATIENT INSTRUCTIONS
SURVEY:    You may be receiving a survey from [employer] regarding your visit today. Please complete the survey to enable us to provide the highest quality of care to you and your family. If you cannot score us a very good on any question, please call the office to discuss how we could have made your experience a very good one. Thank you.

## 2023-03-03 ENCOUNTER — HOSPITAL ENCOUNTER (OUTPATIENT)
Age: 53
Discharge: HOME | End: 2023-03-03
Payer: COMMERCIAL

## 2023-03-03 DIAGNOSIS — R00.2 PALPITATIONS: ICD-10-CM

## 2023-03-03 DIAGNOSIS — I10 PRIMARY HYPERTENSION: ICD-10-CM

## 2023-03-03 DIAGNOSIS — R42 LIGHTHEADED: ICD-10-CM

## 2023-03-03 DIAGNOSIS — R42 DIZZINESS: ICD-10-CM

## 2023-03-03 DIAGNOSIS — E66.01 MORBID OBESITY (HCC): ICD-10-CM

## 2023-03-03 DIAGNOSIS — E78.2 MIXED HYPERLIPIDEMIA: ICD-10-CM

## 2023-03-03 DIAGNOSIS — I34.1 MITRAL VALVE PROLAPSE: ICD-10-CM

## 2023-03-03 DIAGNOSIS — R07.89 ATYPICAL CHEST PAIN: ICD-10-CM

## 2023-03-03 LAB
CHOLEST SERPL-MCNC: 174 MG/DL
CHOLESTEROL/HDL RATIO: 3.2
EST. AVERAGE GLUCOSE BLD GHB EST-MCNC: 114 MG/DL
HBA1C MFR BLD: 5.6 % (ref 4–6)
HDLC SERPL-MCNC: 55 MG/DL
LDLC SERPL CALC-MCNC: 91 MG/DL (ref 0–130)
TRIGL SERPL-MCNC: 141 MG/DL

## 2023-03-03 PROCEDURE — 36415 COLL VENOUS BLD VENIPUNCTURE: CPT

## 2023-03-03 PROCEDURE — 80061 LIPID PANEL: CPT

## 2023-03-03 PROCEDURE — 83036 HEMOGLOBIN GLYCOSYLATED A1C: CPT

## 2023-03-06 ENCOUNTER — TELEPHONE (OUTPATIENT)
Dept: CARDIOLOGY | Age: 53
End: 2023-03-06

## 2023-03-06 NOTE — TELEPHONE ENCOUNTER
----- Message from Radha Huff PA-C sent at 3/6/2023  8:08 AM EST -----  Please notify patient that their lab results are normal.   Please continue current treatment and follow up.

## 2023-03-07 ENCOUNTER — CARE COORDINATION (OUTPATIENT)
Dept: OTHER | Facility: CLINIC | Age: 53
End: 2023-03-07

## 2023-03-07 NOTE — CARE COORDINATION
Ambulatory Care Coordination Note  3/7/2023    AC attempted to reach patient for follow up call regarding care management. HIPAA compliant message left requesting a return phone call at patients convenience. Will continue to follow. Future Appointments   Date Time Provider Arely Trujillo   4/3/2023 10:00 AM [facility] CARDIOLOGY STRESS ROOM [facility] Stress [place]   4/3/2023 10:30 AM [facility] ECHO ROOM [facility] ECHO [place]   4/4/2023 12:30 PM [facility] CARDIOLOGY STRESS ROOM [facility] Stress [place]   4/10/2023  8:00 AM Evelio Garcia PA-C TIFF CARD [facility]   9/11/2023  8:00 AM Jane Wilkinson MD [facility] Maxx Sahni MSN, RN   Ambulatory Care Manager  Associate Care Management  Cell [phone]  [email or web address]

## 2023-03-14 ENCOUNTER — CARE COORDINATION (OUTPATIENT)
Dept: OTHER | Facility: CLINIC | Age: 53
End: 2023-03-14

## 2023-03-14 NOTE — CARE COORDINATION
Ambulatory Care Coordination Note  3/14/2023    ACM attempted to reach patient for follow up call regarding care management. HIPAA compliant message left requesting a return phone call at patients convenience. Lost to follow up letter sent to pt via my chart. Will continue to follow. Future Appointments   Date Time Provider Arely Trujillo   4/10/2023  8:00 AM Maye Doll [place] TIFF CARD [facility]   9/11/2023  8:00 AM James Conn MD [facility] Cipriano Sahni MSN, RN   Ambulatory Care Manager  Associate Care Management  Cell [phone]  [email or web address]

## 2023-03-17 ENCOUNTER — CARE COORDINATION (OUTPATIENT)
Dept: OTHER | Facility: CLINIC | Age: 53
End: 2023-03-17

## 2023-03-18 NOTE — CARE COORDINATION
Ambulatory Care Coordination Note  3/18/2023    Patient Current Location:  Home: [address] [address]     ACM contacted the patient by telephone. Verified name and  with patient as identifiers. Challenges to be reviewed by the provider   Additional needs identified to be addressed with provider: No  none               Method of communication with provider: none. ACM: Radha Barrera RN    ACM contacted the patient to follow up on progress, discuss new issues or concerns, and reinforce/ provide patient education. Summary Note: Pt reports she is feeling a lot better; denies any chest pain, dizziness, shortness of breath, palpitations, fever/chills, cough, nausea/vomiting or diarrhea/constipation. Pt reports she has been checking BP 2x daily; BP this morning 132/84. Encouraged pt to keep log and to notify PCP if BP readings over 140/90. Pt reports she would like to lose weight; states she has lost 7lb over last couple of weeks. ACM discussed importance of healthy plate eating, building balanced meals and daily exercise. Pt set goal to lose 15lb over next 8 weeks. ACM sent pt Dial a dietician info and pt agreeable to a referral to [facility] to work with pt on meal planning. Referral sent. Pt reports she has been trying to walk 2 laps at a park near them, which is 2 miles; encouraged pt to keep doing this to support her weight loss. Pt states that her Echo/stress test were scheduled for 4/3/23, but she had to cancel due to work schedule. She is waiting on May work schedule to reschedule testing. ACM reminded pt that she still has cardiology f/u appt scheduled for 4/10/23; pt states she will call next week to reschedule after she get date for testing, which needs to be completed prior to next cardiology appt. Pt denies any further questions, needs or concerns; is agreeable to [facility] follow up outreach.       Reinforced/ Provided Education:  Discussed red flags and appropriate site of care based on symptoms and resources available to patient including: PCP  Specialist  Benefits related nurse triage line  Urgent care clinics  When to call 911. Provided the following associate/dependent related resources:Nurse Access line 24/7 # [phone]. Download the free [email or web address] and create and account. Go to [email or web address] to create an account. Your copay is $15     Importance and benefits of: Follow up with PCP and specialist, medication adherence, self monitoring and reporting of symptoms. Plan:  Plan for follow up in 2-3 weeks  based on severity of symptoms and risk factors. Plan for next call: symptom management-chest pain  self management-diet/weight loss; BP readings  follow-up appointment-cardiology appt rescheduled? medication management-taking all meds; any changes/questions/refills needed  Schedule Echo/Stress test?    Referral sent to [place] verbalized understanding and is agreeable to follow up contact. Care Coordination Interventions    Referral from Primary Care Provider: No  Suggested Interventions and Community Resources  Behavorial Health: Completed (Comment: Discussed and sent pt handout for [employer])  Life Skills Coaching: In Process (Comment: Educated pt on healthy plate eating and exercise for weight loss)  Registered Dietician:  In Process (Comment: Referral sent to Deonna Velasco [facility])  Specialty Services Referral: Completed (Comment: Referred to Cardiology; appt on 2/27/23)  Other Services: Completed (Comment: ACM educated pt on red flags/appropriate sites of care, including PCP, specialist, [facility] online virtual visits, nurse access line, when to go to urgent care/ED and when to call 911 for life threatening emergencies and pt verbalized understanding)          Goals Addressed                      This Visit's Progress       < 250 (pt-stated)         Short Term Goal: Pt would like to lose 15lb in next 8 weeks. Barriers- lack of knowledge of proper nutrition  Ways to overcome barriers - work with ACM and dietician  Achievement Date- 5/12/23    3/17/23-Pt states she has lost 7lb in the last couple of weeks and would like to lose 15lb in the next 8 weeks. ACM educated pt on healthy meal planning, decreasing carb intake, protein intake, and adequate nutrition and daily exercise. AC sent pt educational materials on healthy eating and dial a dietician info and sent referral to [phone]. Conditions and Symptoms   On track      I will schedule office visits, as directed by my provider. I will keep my appointment or reschedule if I have to cancel. I will notify my provider of any barriers to my plan of care. I will notify my provider of any symptoms that indicate a worsening of my condition. Barriers: lack of education  Plan for overcoming my barriers: work with ACM  Confidence: 8/10  Anticipated Goal Completion Date: 4/1/23 2/14/23- Pt has f/u appt with cardiology on 2/27/23; encouraged pt to schedule f/u appt with pcp  2/21/23- Pt has cardiology f/u appt on 2/27/23; has not scheduled f/u appt with pcp  3/17/23-Pt completed cardiology appt on 2/27/23; Stress test/Echo need to be scheduled. Pt reports was scheduled on 4/3/23, but needs to reschedule to May 2023 due to work schedule. Pt next cardiology appt 4/10/23, but pt states she needs to reschedule until after Stress test/Echo is completed. Pt reports she will get testing and f/u appt with cardiology rescheduled next week.                    Future Appointments   Date Time Provider Arely Trujillo   4/10/2023  8:00 AM Lazaro Dominguez [place] TIFF CARD [facility]   9/11/2023  8:00 AM [facility]    and   General Assessment    Do you have any symptoms that are causing concern?: No     Education Documentation  Nutrition (eg:  Build/Reinforce basic nutrition knowledge related to Disease/Health Issues), taught by Inez Smith RN at 3/18/2023  1:11 PM.  Learner: Patient  Readiness: [employer]  Method: Explanation, Handout  Response: Verbalizes Understanding  Comment: Educated pt on Healthy Plate Eating and Exercise to promote weight loss. Pt reports she has lost 7lbs in last couple of weeks and is ready to start eating healthy. Referral also placed to Dietician. Educate reporting changes in condition, taught by Inez Smith RN at 3/18/2023  1:08 PM.  Learner: Patient  Readiness: Acceptance  Method: Explanation  Response: Verbalizes Understanding  Comment: Educated pt on resources available to pt including: PCP, Specialist, Nurse triage line, [facility] virtual visits and [employer]; educated pt on red flags and appropriate sites of care. Educate Patient on When to Call for Symptoms, taught by Inez Smith RN at 3/18/2023  1:08 PM.  Learner: Patient  Readiness: Acceptance  Method: Explanation  Response: Verbalizes Understanding  Comment: Educated pt on resources available to pt including: PCP, Specialist, Nurse triage line, [facility] virtual visits and [employer]; educated pt on red flags and appropriate sites of care. Education Comments  No comments found. Papa Zamora MSN, RN   Ambulatory Care Manager  Associate Care Management  Cell [phone]  [email or web address]

## 2023-03-21 ENCOUNTER — CARE COORDINATION (OUTPATIENT)
Dept: CARE COORDINATION | Age: 53
End: 2023-03-21

## 2023-03-21 NOTE — CARE COORDINATION
RD received referral from [facility] [employer]:  Santiago Wells, I am referring this pt to you as her BMI is >50. We discussed today weight loss and she is interested in losing weight; states she has lost 7lb in last couple of weeks. She would like some information on best way to lost weight, sample meal plans, etc. The best time to reach her is 8-8:30am, as she works night shift. RD contacted patient and left voicemail regarding Dietitian referral. Left call back number and will follow up as appropriate.      [address] [place] [address],   [phone]

## 2023-03-24 ENCOUNTER — CARE COORDINATION (OUTPATIENT)
Dept: CARE COORDINATION | Age: 53
End: 2023-03-24

## 2023-03-24 NOTE — CARE COORDINATION
Contacted [facility] and left voicemail regarding Dietitian referral. Left call back number and will follow up as appropriate.          [address] [place] [address],   [phone]

## 2023-03-27 ENCOUNTER — CARE COORDINATION (OUTPATIENT)
Dept: CARE COORDINATION | Age: 53
End: 2023-03-27

## 2023-03-27 NOTE — CARE COORDINATION
and grapes, etc. Patient verbalizes understanding. RD discussed the importance of incorporating physical activity. Explained the recommended amount is 150 minutes/week. Discussed browsing [employer] or [employer] for at home work outs. RD acknowledged patient's readiness to change and encouraged pt to focus on making small changes one at a time. RD offered to mail educational handouts to pt to reinforce concepts discussed during phone conversation, pt accepted and prefers MyChart. 24-Hour Diet Recall  Breakfast  Consumed: 7:30 AM eggs with something sweet     Lunch  Consumed: none    Dinner  Consumed: 6:30 PM meat (chicken or steak), potatoes and vegetables     Snack at work between 1-2 AM  Consumed: protein bar or deli meat with a slice of cheese    Beverages: water and diet pop     Exercise: walking     Nutrition Diagnosis:  #1 Problem Overweight/Obesity       Etiology related to excessive energy intake        Signs/Symptoms as evidenced by BMI 50.47 (obesity class III)    Nutrition Intervention:     Estimated Needs  regular diet providing 7705-4367 kcals to promote wt loss ([address] based on AdBW for obesity class III). Estimated daily Protein Needs: 59-74 g (based on 0.8-1.0 g/kg based on AdBW)  Estimated daily Fluid Needs: 64 oz.or per MD    #1 Nutrition Information: Provided patient with Smart Snacking, Weight Loss Tips, General Healthy Nutrition Therapy, Eating Right with MyPlate, Understanding Food Labels handouts. For reinforcement of concepts discussed during nutrition interview. #2 Nutrition Counseling: Used open-ended questions to assess patients perceived susceptibility and severity of disease state. Discussed potential impact of health threat on patient's lifestyle. Used open-ended questions to assess patient's perception regarding benefits of and barriers to implementation of nutrition therapy. #3 Nutrition Education: Clearly defined the benefits of nutrition therapy.   Summarized and

## 2023-03-31 ENCOUNTER — CARE COORDINATION (OUTPATIENT)
Dept: OTHER | Facility: CLINIC | Age: 53
End: 2023-03-31

## 2023-03-31 NOTE — CARE COORDINATION
Ambulatory Care Coordination Note  3/31/2023    Ambulatory Care Coordination Note    ACM attempted to reach patient for follow up call regarding care management. HIPAA compliant message left requesting a return phone call at patient convenience. Will continue to outreach. Future Appointments   Date Time Provider Arely Trujillo   5/11/2023  8:30 AM [facility] ECHO ROOM [facility] ECHO [place]   5/11/2023 10:00 AM [facility] CARDIOLOGY STRESS ROOM [facility] Stress [place]   5/12/2023 12:30 PM [facility] CARDIOLOGY STRESS ROOM [facility] Stress [place]   9/11/2023  8:00 AM MD Jeanne Hunter MSN, RN   Ambulatory Care Manager  Associate Care Management  Cell [phone]  [email or web address]

## 2023-04-25 ENCOUNTER — CARE COORDINATION (OUTPATIENT)
Dept: OTHER | Facility: CLINIC | Age: 53
End: 2023-04-25

## 2023-04-25 NOTE — CARE COORDINATION
Ambulatory Care Coordination Note  2023    Patient Current Location:  Home: [address] [address]     ACM contacted the patient by telephone. Verified name and  with patient as identifiers. Challenges to be reviewed by the provider   Additional needs identified to be addressed with provider: No  none               Method of communication with provider: none. ACM: Ana María Knox RN    ACM contacted the patient to follow up on progress, discuss new issues or concerns, and reinforce/provide patient education. Summary Note: Pt reports she has been feeling good; denies any chest pain, dizziness, palpitations, shortness of breath, cough, nausea/vomiting or diarrhea. Pt states she does have intermittent constipation; taking fiber gummies and miralax as needed. Pt reports she checks her BP intermittently; last check on 23 was 138/88; encouraged keeping log and taking to f/u appt with cardiology in May. Pt does have cardiac testing on 23 and will schedule f/u appt with cardiology after testing. Encouraged pt to call cardiology office now to get appt scheduled. Medication reconciliation was performed with patient, who verbalizes understanding of administration of home medications; denies any questions or need for refills. 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 Pt states was eating fruit during night when working; dietician suggested incorporating proteins at night as well. ACM educated pt on healthy protein snacks, such as cheese, nuts, cottage cheese, yogurt. Pt feels she is doing really well with following diet and is walking 30 min daily with . Pt has lost over 14lb and is down to 236. ACM congratulated pt for weight lost and encouraged to continue.    Pt denies any further questions, needs or concerns; is agreeable to AC follow up outreach in 1 month after cardiology

## 2023-05-01 ENCOUNTER — CARE COORDINATION (OUTPATIENT)
Dept: OTHER | Facility: CLINIC | Age: 53
End: 2023-05-01

## 2023-05-01 NOTE — CARE COORDINATION
Ambulatory Care Coordination Note  5/1/2023    Pt messaged ACM via My Chart:    Santiago Heredia, I did make a follow up appointment just today, for after my testing, for May 18th. In the meantime, I wanted to ask, I have recently had the symptoms again of the pericarditis, for about 3 days now. It's much milder than the first time, and I am managing it pretty well with Tylenol and ibuprofen. I don't feel like I need to go to the ER or anything like that, because it feels exactly like the first time, with specific symptoms that are that condition. Do you think it's ok to just continue to manage in this way until my appointments? I will definitely mention this at that time. Thank you for any help! ACM responded to pt, encouraging pt to reach out to Cardiology and/or PCP and to report current symptoms and to follow provider's recommendation. Will continue to outreach. Future Appointments   Date Time Provider Arely Trujillo   5/11/2023  8:30 AM [facility] CARDIOLOGY STRESS ROOM [facility] Stress [place]   5/11/2023  9:00 AM [facility] ECHO ROOM [facility] ECHO [place]   5/12/2023 12:30 PM [facility] CARDIOLOGY STRESS ROOM [facility] Stress [place]   5/18/2023  8:30 AM Farhana Munoz PA-C TIFF CARD [facility]   9/11/2023  8:00 AM Eric Portillo MD [facility] Charles Sahni MSN, RN   Ambulatory Care Manager  Associate Care Management  Cell [phone]  [email or web address]

## 2023-05-03 ENCOUNTER — HOSPITAL ENCOUNTER (OUTPATIENT)
Age: 53
Discharge: HOME | End: 2023-05-03
Payer: COMMERCIAL

## 2023-05-03 ENCOUNTER — HOSPITAL ENCOUNTER (OUTPATIENT)
Dept: NON INVASIVE DIAGNOSTICS | Age: 53
Discharge: HOME | End: 2023-05-03
Payer: COMMERCIAL

## 2023-05-03 ENCOUNTER — TELEPHONE (OUTPATIENT)
Dept: CARDIOLOGY | Age: 53
End: 2023-05-03

## 2023-05-03 ENCOUNTER — OFFICE VISIT (OUTPATIENT)
Dept: CARDIOLOGY | Age: 53
End: 2023-05-03
Payer: COMMERCIAL

## 2023-05-03 VITALS
HEART RATE: 82 BPM | DIASTOLIC BLOOD PRESSURE: 79 MMHG | OXYGEN SATURATION: 98 % | WEIGHT: 243 LBS | BODY MASS INDEX: 47.71 KG/M2 | HEIGHT: 60 IN | RESPIRATION RATE: 20 BRPM | SYSTOLIC BLOOD PRESSURE: 133 MMHG

## 2023-05-03 DIAGNOSIS — R06.02 SOB (SHORTNESS OF BREATH): ICD-10-CM

## 2023-05-03 DIAGNOSIS — I34.1 MITRAL VALVE PROLAPSE: ICD-10-CM

## 2023-05-03 DIAGNOSIS — R42 DIZZINESS: ICD-10-CM

## 2023-05-03 DIAGNOSIS — R00.2 PALPITATIONS: ICD-10-CM

## 2023-05-03 DIAGNOSIS — I31.39 PERICARDIAL EFFUSION WITHOUT CARDIAC TAMPONADE: ICD-10-CM

## 2023-05-03 DIAGNOSIS — E66.01 MORBID OBESITY (HCC): ICD-10-CM

## 2023-05-03 DIAGNOSIS — E78.2 MIXED HYPERLIPIDEMIA: ICD-10-CM

## 2023-05-03 DIAGNOSIS — I10 ESSENTIAL HYPERTENSION: ICD-10-CM

## 2023-05-03 DIAGNOSIS — I30.0 RECURRENT IDIOPATHIC PERICARDITIS: ICD-10-CM

## 2023-05-03 DIAGNOSIS — R07.89 ATYPICAL CHEST PAIN: ICD-10-CM

## 2023-05-03 DIAGNOSIS — R07.9 CHEST PAIN, UNSPECIFIED TYPE: ICD-10-CM

## 2023-05-03 DIAGNOSIS — I10 PRIMARY HYPERTENSION: ICD-10-CM

## 2023-05-03 DIAGNOSIS — R07.9 CHEST PAIN, UNSPECIFIED TYPE: Primary | ICD-10-CM

## 2023-05-03 DIAGNOSIS — I30.0 RECURRENT IDIOPATHIC PERICARDITIS: Primary | ICD-10-CM

## 2023-05-03 DIAGNOSIS — I30.0 IDIOPATHIC PERICARDITIS, UNSPECIFIED CHRONICITY: ICD-10-CM

## 2023-05-03 DIAGNOSIS — R42 LIGHTHEADED: ICD-10-CM

## 2023-05-03 LAB
CK SERPL-CCNC: 61 U/L (ref 26–192)
CRP SERPL HS-MCNC: 179.8 MG/L (ref 0–5)
CRP SERPL HS-MCNC: 190.3 MG/L (ref 0–5)
ERYTHROCYTE [SEDIMENTATION RATE] IN BLOOD BY WESTERGREN METHOD: 19 MM/HR (ref 0–30)
ERYTHROCYTE [SEDIMENTATION RATE] IN BLOOD BY WESTERGREN METHOD: 21 MM/HR (ref 0–30)
LV EF: 60 %
LVEF MODALITY: NORMAL
TROPONIN I SERPL DL<=0.01 NG/ML-MCNC: 7 NG/L (ref 0–14)

## 2023-05-03 PROCEDURE — 86038 ANTINUCLEAR ANTIBODIES: CPT

## 2023-05-03 PROCEDURE — 93000 ELECTROCARDIOGRAM COMPLETE: CPT | Performed by: INTERNAL MEDICINE

## 2023-05-03 PROCEDURE — 36415 COLL VENOUS BLD VENIPUNCTURE: CPT

## 2023-05-03 PROCEDURE — 86140 C-REACTIVE PROTEIN: CPT

## 2023-05-03 PROCEDURE — 93306 TTE W/DOPPLER COMPLETE: CPT

## 2023-05-03 PROCEDURE — 86225 DNA ANTIBODY NATIVE: CPT

## 2023-05-03 PROCEDURE — 82550 ASSAY OF CK (CPK): CPT

## 2023-05-03 PROCEDURE — 85652 RBC SED RATE AUTOMATED: CPT

## 2023-05-03 PROCEDURE — 84484 ASSAY OF TROPONIN QUANT: CPT

## 2023-05-03 RX ORDER — PANTOPRAZOLE SODIUM 20 MG/1
20 TABLET, DELAYED RELEASE ORAL
Qty: 14 TABLET | Refills: 0 | Status: SHIPPED | OUTPATIENT
Start: 2023-05-03 | End: 2023-05-17

## 2023-05-03 RX ORDER — INDOMETHACIN 25 MG/1
25 CAPSULE ORAL 2 TIMES DAILY WITH MEALS
Qty: 28 CAPSULE | Refills: 0 | Status: ON HOLD
Start: 2023-05-03 | End: 2023-05-07 | Stop reason: HOSPADM

## 2023-05-03 RX ORDER — COLCHICINE 0.6 MG/1
0.6 TABLET ORAL 2 TIMES DAILY
Qty: 180 TABLET | Refills: 0 | Status: SHIPPED | OUTPATIENT
Start: 2023-05-03 | End: 2023-08-01

## 2023-05-03 NOTE — TELEPHONE ENCOUNTER
Patient has  pericarditis and feels like it has flared up. She is having a hard time laying down,CP, and SOB. Please advise?

## 2023-05-03 NOTE — PROGRESS NOTES
Sed Rate and a C-Reactive Protein in 1 week. Cancel her stress test scheduled for 5/11/2023. Trace Pericardial Effusion: Seen on CT 2/16/2023   Repeat limited echo in 1 week. History of Mitral Valve Prolapse      Essential Hypertension: Controlled  Calcium Channel Blocker: Continue verapamil 360 mg once daily. ACE Inibitor/ARB: Continue enalapril (Vasotec) 10 mg daily  Diuretics: Continue Hydrochlorothiazide (HCTZ) 25 mg every morning. Hyperlipidemia: Mixed - Last LDL on 3/3/2023 was 91 mg/dL  Cholesterol Reduction Therapy: Continue simvastatin (Zocor) 40 mg daily. Morbid obesity: Body mass index is 47.46 kg/m². I also briefly discussed both diet and exercise strategies for her to continue to loses weight and she was very receptive to this. In the meantime, I encouraged Ms. Gill to continue to take her other medications. I discussed patient's symptoms and treatment plan with Dr Ivy Kinney, he was in agreement with the plan and follow up. FOLLOW UP:   I told Ms. Gill to call my office if she had any problems, but otherwise I asked her to Return in about 2 weeks (around 5/17/2023) for with Dr Ivy Kinney. However, I would be happy to see her sooner should the need arise. Sincerely,  Claudina Rinne, PA-C  [facility] Cardiology Specialist    [address] [facility], [address] [place] [address]  Phone: [phone], Fax: [phone]     I believe that the risk of significant morbidity and mortality related to the patient's current medical conditions are: intermediate-high. 50 minutes    The documentation recorded by the scribe, accurately and completely reflects the services I personally performed and the decisions made by me.  Claudina Rinne, PA-C May 3, 2023

## 2023-05-03 NOTE — PROGRESS NOTES
Ordered blood work and echo to be done today. Has a history of pericarditis and having chest pain and shortness of breath. Will see in office today.

## 2023-05-03 NOTE — TELEPHONE ENCOUNTER
Please have her come in today for an appointment. Will need to get a ESR, CRP, troponin, CK and STAT echo. I have placed the orders if she can get blood work done prior to her appointment. I can see her at 12 if we need.

## 2023-05-05 ENCOUNTER — OFFICE VISIT (OUTPATIENT)
Dept: CARDIOLOGY | Age: 53
End: 2023-05-05

## 2023-05-05 ENCOUNTER — TELEPHONE (OUTPATIENT)
Dept: CARDIOLOGY | Age: 53
End: 2023-05-05

## 2023-05-05 ENCOUNTER — HOSPITAL ENCOUNTER (INPATIENT)
Age: 53
LOS: 2 days | Discharge: HOME | DRG: 315 | End: 2023-05-07
Attending: INTERNAL MEDICINE | Admitting: INTERNAL MEDICINE
Payer: COMMERCIAL

## 2023-05-05 ENCOUNTER — HOSPITAL ENCOUNTER (OUTPATIENT)
Dept: NON INVASIVE DIAGNOSTICS | Age: 53
Discharge: HOME | End: 2023-05-05
Payer: COMMERCIAL

## 2023-05-05 ENCOUNTER — APPOINTMENT (OUTPATIENT)
Dept: GENERAL RADIOLOGY | Age: 53
DRG: 315 | End: 2023-05-05
Attending: INTERNAL MEDICINE
Payer: COMMERCIAL

## 2023-05-05 VITALS
SYSTOLIC BLOOD PRESSURE: 170 MMHG | BODY MASS INDEX: 49 KG/M2 | HEART RATE: 89 BPM | HEIGHT: 60 IN | OXYGEN SATURATION: 98 % | DIASTOLIC BLOOD PRESSURE: 87 MMHG | WEIGHT: 249.6 LBS | RESPIRATION RATE: 18 BRPM

## 2023-05-05 DIAGNOSIS — I30.0 RECURRENT IDIOPATHIC PERICARDITIS: Primary | ICD-10-CM

## 2023-05-05 DIAGNOSIS — I30.0 RECURRENT IDIOPATHIC PERICARDITIS: ICD-10-CM

## 2023-05-05 DIAGNOSIS — I30.9 ACUTE PERICARDITIS, UNSPECIFIED TYPE: ICD-10-CM

## 2023-05-05 DIAGNOSIS — R79.89 ELEVATED LFTS: Primary | ICD-10-CM

## 2023-05-05 DIAGNOSIS — I10 BENIGN ESSENTIAL HTN: ICD-10-CM

## 2023-05-05 PROBLEM — I31.39 PERICARDIAL EFFUSION: Status: ACTIVE | Noted: 2023-05-05

## 2023-05-05 LAB
ABSOLUTE EOS #: 0.08 K/UL (ref 0–0.44)
ABSOLUTE IMMATURE GRANULOCYTE: 0.05 K/UL (ref 0–0.3)
ABSOLUTE LYMPH #: 2.47 K/UL (ref 1.1–3.7)
ABSOLUTE MONO #: 0.76 K/UL (ref 0.1–1.2)
ALBUMIN SERPL-MCNC: 3.8 G/DL (ref 3.5–5.2)
ALBUMIN/GLOBULIN RATIO: 1.1 (ref 1–2.5)
ALP SERPL-CCNC: 234 U/L (ref 35–104)
ALT SERPL-CCNC: 138 U/L (ref 5–33)
ANA SER QL IA: NEGATIVE
ANION GAP SERPL CALCULATED.3IONS-SCNC: 10 MMOL/L (ref 9–17)
AST SERPL-CCNC: 129 U/L
BASOPHILS # BLD: 0 % (ref 0–2)
BASOPHILS ABSOLUTE: 0.03 K/UL (ref 0–0.2)
BILIRUB SERPL-MCNC: 0.4 MG/DL (ref 0.3–1.2)
BNP SERPL-MCNC: 212 PG/ML
BUN SERPL-MCNC: 16 MG/DL (ref 6–20)
BUN/CREAT BLD: 34 (ref 9–20)
CALCIUM SERPL-MCNC: 10 MG/DL (ref 8.6–10.4)
CHLORIDE SERPL-SCNC: 106 MMOL/L (ref 98–107)
CO2 SERPL-SCNC: 24 MMOL/L (ref 20–31)
CREAT SERPL-MCNC: 0.47 MG/DL (ref 0.5–0.9)
DSDNA IGG SER QL IA: <0.5 IU/ML
EOSINOPHILS RELATIVE PERCENT: 1 % (ref 1–4)
GFR SERPL CREATININE-BSD FRML MDRD: >60 ML/MIN/1.73M2
GLUCOSE SERPL-MCNC: 118 MG/DL (ref 70–99)
HCT VFR BLD AUTO: 32.5 % (ref 36.3–47.1)
HGB BLD-MCNC: 10.3 G/DL (ref 11.9–15.1)
IMMATURE GRANULOCYTES: 1 %
INR PPP: 1
LV EF: 65 %
LVEF MODALITY: NORMAL
LYMPHOCYTES # BLD: 23 % (ref 24–43)
MAGNESIUM SERPL-MCNC: 2 MG/DL (ref 1.6–2.6)
MCH RBC QN AUTO: 28.1 PG (ref 25.2–33.5)
MCHC RBC AUTO-ENTMCNC: 31.7 G/DL (ref 28.4–34.8)
MCV RBC AUTO: 88.8 FL (ref 82.6–102.9)
MONOCYTES # BLD: 7 % (ref 3–12)
NRBC AUTOMATED: 0 PER 100 WBC
NUCLEAR IGG SER IA-RTO: 0.1 U/ML
PARTIAL THROMBOPLASTIN TIME: 34.4 SEC (ref 26.8–34.8)
PDW BLD-RTO: 13.4 % (ref 11.8–14.4)
PLATELET # BLD AUTO: 309 K/UL (ref 138–453)
PMV BLD AUTO: 9.7 FL (ref 8.1–13.5)
POTASSIUM SERPL-SCNC: 4.3 MMOL/L (ref 3.7–5.3)
PROT SERPL-MCNC: 7.4 G/DL (ref 6.4–8.3)
PROTHROMBIN TIME: 13 SEC (ref 11.9–14.8)
RBC # BLD: 3.66 M/UL (ref 3.95–5.11)
REASON FOR REJECTION: NORMAL
SEG NEUTROPHILS: 68 % (ref 36–65)
SEGMENTED NEUTROPHILS ABSOLUTE COUNT: 7.3 K/UL (ref 1.5–8.1)
SODIUM SERPL-SCNC: 140 MMOL/L (ref 135–144)
TROPONIN I SERPL DL<=0.01 NG/ML-MCNC: 45 NG/L (ref 0–14)
TROPONIN I SERPL DL<=0.01 NG/ML-MCNC: 6 NG/L (ref 0–14)
TROPONIN I SERPL DL<=0.01 NG/ML-MCNC: <6 NG/L (ref 0–14)
TSH SERPL-ACNC: 1.67 UIU/ML (ref 0.3–5)
WBC # BLD AUTO: 10.7 K/UL (ref 3.5–11.3)
ZZ NTE CLEAN UP: ORDERED TEST: NORMAL
ZZ NTE WITH NAME CLEAN UP: SPECIMEN SOURCE: NORMAL

## 2023-05-05 PROCEDURE — 83735 ASSAY OF MAGNESIUM: CPT

## 2023-05-05 PROCEDURE — 71046 X-RAY EXAM CHEST 2 VIEWS: CPT

## 2023-05-05 PROCEDURE — 84484 ASSAY OF TROPONIN QUANT: CPT

## 2023-05-05 PROCEDURE — 6360000002 HC RX W HCPCS: Performed by: NURSE PRACTITIONER

## 2023-05-05 PROCEDURE — 80053 COMPREHEN METABOLIC PANEL: CPT

## 2023-05-05 PROCEDURE — 1200000000 HC SEMI PRIVATE

## 2023-05-05 PROCEDURE — 84443 ASSAY THYROID STIM HORMONE: CPT

## 2023-05-05 PROCEDURE — 93306 TTE W/DOPPLER COMPLETE: CPT

## 2023-05-05 PROCEDURE — 36415 COLL VENOUS BLD VENIPUNCTURE: CPT

## 2023-05-05 PROCEDURE — 85730 THROMBOPLASTIN TIME PARTIAL: CPT

## 2023-05-05 PROCEDURE — 83880 ASSAY OF NATRIURETIC PEPTIDE: CPT

## 2023-05-05 PROCEDURE — 84439 ASSAY OF FREE THYROXINE: CPT

## 2023-05-05 PROCEDURE — 94761 N-INVAS EAR/PLS OXIMETRY MLT: CPT

## 2023-05-05 PROCEDURE — 85025 COMPLETE CBC W/AUTO DIFF WBC: CPT

## 2023-05-05 PROCEDURE — 2580000003 HC RX 258: Performed by: NURSE PRACTITIONER

## 2023-05-05 PROCEDURE — 93005 ELECTROCARDIOGRAM TRACING: CPT | Performed by: NURSE PRACTITIONER

## 2023-05-05 PROCEDURE — 85610 PROTHROMBIN TIME: CPT

## 2023-05-05 PROCEDURE — 6370000000 HC RX 637 (ALT 250 FOR IP): Performed by: NURSE PRACTITIONER

## 2023-05-05 RX ORDER — POLYETHYLENE GLYCOL 3350 17 G/17G
17 POWDER, FOR SOLUTION ORAL DAILY PRN
Status: DISCONTINUED | OUTPATIENT
Start: 2023-05-05 | End: 2023-05-07 | Stop reason: HOSPADM

## 2023-05-05 RX ORDER — SODIUM CHLORIDE 0.9 % (FLUSH) 0.9 %
10 SYRINGE (ML) INJECTION PRN
Status: DISCONTINUED | OUTPATIENT
Start: 2023-05-05 | End: 2023-05-07 | Stop reason: HOSPADM

## 2023-05-05 RX ORDER — POTASSIUM CHLORIDE 20 MEQ/1
20 TABLET, EXTENDED RELEASE ORAL 2 TIMES DAILY WITH MEALS
Status: DISCONTINUED | OUTPATIENT
Start: 2023-05-05 | End: 2023-05-07 | Stop reason: HOSPADM

## 2023-05-05 RX ORDER — COLCHICINE 0.6 MG/1
0.6 TABLET ORAL 2 TIMES DAILY
Status: DISCONTINUED | OUTPATIENT
Start: 2023-05-05 | End: 2023-05-07 | Stop reason: HOSPADM

## 2023-05-05 RX ORDER — SODIUM CHLORIDE 9 MG/ML
INJECTION, SOLUTION INTRAVENOUS PRN
Status: DISCONTINUED | OUTPATIENT
Start: 2023-05-05 | End: 2023-05-07 | Stop reason: HOSPADM

## 2023-05-05 RX ORDER — FUROSEMIDE 10 MG/ML
40 INJECTION INTRAMUSCULAR; INTRAVENOUS DAILY
Status: DISCONTINUED | OUTPATIENT
Start: 2023-05-05 | End: 2023-05-07 | Stop reason: HOSPADM

## 2023-05-05 RX ORDER — ONDANSETRON 2 MG/ML
4 INJECTION INTRAMUSCULAR; INTRAVENOUS EVERY 6 HOURS PRN
Status: DISCONTINUED | OUTPATIENT
Start: 2023-05-05 | End: 2023-05-07 | Stop reason: HOSPADM

## 2023-05-05 RX ORDER — ACETAMINOPHEN 325 MG/1
650 TABLET ORAL EVERY 6 HOURS PRN
Status: DISCONTINUED | OUTPATIENT
Start: 2023-05-05 | End: 2023-05-07 | Stop reason: HOSPADM

## 2023-05-05 RX ORDER — ENOXAPARIN SODIUM 100 MG/ML
30 INJECTION SUBCUTANEOUS 2 TIMES DAILY
Status: DISCONTINUED | OUTPATIENT
Start: 2023-05-05 | End: 2023-05-07 | Stop reason: HOSPADM

## 2023-05-05 RX ORDER — MULTIVITAMIN WITH IRON
1 TABLET ORAL DAILY
Status: DISCONTINUED | OUTPATIENT
Start: 2023-05-05 | End: 2023-05-07 | Stop reason: HOSPADM

## 2023-05-05 RX ORDER — ATORVASTATIN CALCIUM 20 MG/1
20 TABLET, FILM COATED ORAL NIGHTLY
Status: DISCONTINUED | OUTPATIENT
Start: 2023-05-05 | End: 2023-05-07 | Stop reason: HOSPADM

## 2023-05-05 RX ORDER — ACETAMINOPHEN 650 MG/1
650 SUPPOSITORY RECTAL EVERY 6 HOURS PRN
Status: DISCONTINUED | OUTPATIENT
Start: 2023-05-05 | End: 2023-05-07 | Stop reason: HOSPADM

## 2023-05-05 RX ORDER — ENALAPRIL MALEATE 10 MG/1
10 TABLET ORAL DAILY
Status: DISCONTINUED | OUTPATIENT
Start: 2023-05-05 | End: 2023-05-07 | Stop reason: HOSPADM

## 2023-05-05 RX ORDER — PANTOPRAZOLE SODIUM 20 MG/1
20 TABLET, DELAYED RELEASE ORAL
Status: DISCONTINUED | OUTPATIENT
Start: 2023-05-06 | End: 2023-05-07 | Stop reason: HOSPADM

## 2023-05-05 RX ORDER — ATORVASTATIN CALCIUM 20 MG/1
20 TABLET, FILM COATED ORAL DAILY
Status: DISCONTINUED | OUTPATIENT
Start: 2023-05-05 | End: 2023-05-05

## 2023-05-05 RX ORDER — INDOMETHACIN 25 MG/1
25 CAPSULE ORAL 2 TIMES DAILY WITH MEALS
Status: DISCONTINUED | OUTPATIENT
Start: 2023-05-05 | End: 2023-05-07 | Stop reason: HOSPADM

## 2023-05-05 RX ORDER — HYDROCHLOROTHIAZIDE 25 MG/1
25 TABLET ORAL EVERY MORNING
Status: DISCONTINUED | OUTPATIENT
Start: 2023-05-06 | End: 2023-05-07 | Stop reason: HOSPADM

## 2023-05-05 RX ORDER — SODIUM CHLORIDE 0.9 % (FLUSH) 0.9 %
10 SYRINGE (ML) INJECTION EVERY 12 HOURS SCHEDULED
Status: DISCONTINUED | OUTPATIENT
Start: 2023-05-05 | End: 2023-05-07 | Stop reason: HOSPADM

## 2023-05-05 RX ORDER — PROMETHAZINE HYDROCHLORIDE 25 MG/1
12.5 TABLET ORAL EVERY 6 HOURS PRN
Status: DISCONTINUED | OUTPATIENT
Start: 2023-05-05 | End: 2023-05-07 | Stop reason: HOSPADM

## 2023-05-05 RX ADMIN — FUROSEMIDE 40 MG: 10 INJECTION, SOLUTION INTRAMUSCULAR; INTRAVENOUS at 16:04

## 2023-05-05 RX ADMIN — INDOMETHACIN 25 MG: 25 CAPSULE ORAL at 16:02

## 2023-05-05 RX ADMIN — VERAPAMIL HYDROCHLORIDE 360 MG: 180 TABLET, FILM COATED, EXTENDED RELEASE ORAL at 16:02

## 2023-05-05 RX ADMIN — ENOXAPARIN SODIUM 30 MG: 30 INJECTION SUBCUTANEOUS at 20:49

## 2023-05-05 RX ADMIN — COLCHICINE 0.6 MG: 0.6 TABLET ORAL at 20:47

## 2023-05-05 RX ADMIN — THERA TABS 1 TABLET: TAB at 16:02

## 2023-05-05 RX ADMIN — ATORVASTATIN CALCIUM 20 MG: 20 TABLET, FILM COATED ORAL at 20:48

## 2023-05-05 RX ADMIN — ENALAPRIL MALEATE 10 MG: 10 TABLET ORAL at 16:04

## 2023-05-05 RX ADMIN — POTASSIUM CHLORIDE 20 MEQ: 1500 TABLET, EXTENDED RELEASE ORAL at 16:05

## 2023-05-05 RX ADMIN — SODIUM CHLORIDE, PRESERVATIVE FREE 10 ML: 5 INJECTION INTRAVENOUS at 20:49

## 2023-05-05 ASSESSMENT — PAIN SCALES - GENERAL
PAINLEVEL_OUTOF10: 0
PAINLEVEL_OUTOF10: 0
PAINLEVEL_OUTOF10: 5
PAINLEVEL_OUTOF10: 0
PAINLEVEL_OUTOF10: 0

## 2023-05-05 ASSESSMENT — PAIN DESCRIPTION - ORIENTATION: ORIENTATION: MID

## 2023-05-05 ASSESSMENT — PAIN DESCRIPTION - LOCATION: LOCATION: CHEST

## 2023-05-05 ASSESSMENT — PAIN SCALES - WONG BAKER: WONGBAKER_NUMERICALRESPONSE: 0

## 2023-05-05 ASSESSMENT — PAIN DESCRIPTION - DESCRIPTORS: DESCRIPTORS: ACHING

## 2023-05-05 ASSESSMENT — PAIN - FUNCTIONAL ASSESSMENT: PAIN_FUNCTIONAL_ASSESSMENT: ACTIVITIES ARE NOT PREVENTED

## 2023-05-05 NOTE — TELEPHONE ENCOUNTER
Patient has gained 7 pounds in 2 days. She is still having some chest pain. She wants to know if she is to continue her statin because it was indicated not to take this with her colchicine. This was on her patient packet. She did not take her simvastatin last night just to be safe. Please advise.

## 2023-05-05 NOTE — PROGRESS NOTES
Patient: Gertrude Beltran  : 1970  Date of Visit: May 5, 2023    REASON FOR VISIT / CONSULTATION: Follow-up (HX:pericarditis, CP Pt is here for worsening CP over the last couple of days, tight/sharp worse when breathing in, Denies:SOB, lightheaded/dizziness,palp )    History of Present Illness:        Dear Vini Solo MD    I had the pleasure of seeing Ms. Gill today who is a 47 y.o. female who presents for evaluation of chest pain. Past cardiac medical history consist of hypertension for years and has been on medication. She does have hyperlipidemia and is on simvastatin. She denies history of thyroid disease or diabetes. No history of syncopal episodes. she is a nonsmoker. Her last known pericarditis was in 2023. Family cardiac history consist of her brother who had heart attack at age 44, her dad had 2 heart attacks, starting in his 52's - both have had open heart and stents placed. Her sister who has pacemaker as well. Chest CT  2023: Calcified coronary atheromatous plaque    EKG done in office 2023: Normal Sinus Rhythm, heart rate 66 bpm.    EKG done in office today 5/3/2023: Normal sinus rhythm heart rate 80 bpm.    Ms. Jamari Espinoza is here today for same day appointment due to worsening shortness of breath and she is up 6 pounds. She reports her chest pain and shortness of breath is staying the same. She reports the pain with inhaling is worse. If she lays down flat her symptoms get worse but if she leans forward it helps her pain. No radiation of pain. She denied any heart palpitations, lightheaded or dizziness. No cough, fever or chills. No abdominal pain, bleeding problems, bowel issues, problems with her medications or any other concerns at this time. No syncopal or near syncopal episodes.      Bleeding Risks: Ms. Jamari Espinoza denies any current or recent bleeding problems including a history of a GI bleed, ulcers, recent or upcoming

## 2023-05-05 NOTE — PATIENT INSTRUCTIONS
SURVEY:    You may be receiving a survey from [email or web address] regarding your visit today. Please complete the survey to enable us to provide the highest quality of care to you and your family. If you cannot score us a very good on any question, please call the office to discuss how we could have made your experience a very good one. Thank you.

## 2023-05-06 LAB
ABSOLUTE EOS #: 0.08 K/UL (ref 0–0.44)
ABSOLUTE IMMATURE GRANULOCYTE: 0.04 K/UL (ref 0–0.3)
ABSOLUTE LYMPH #: 1.89 K/UL (ref 1.1–3.7)
ABSOLUTE MONO #: 0.56 K/UL (ref 0.1–1.2)
ANION GAP SERPL CALCULATED.3IONS-SCNC: 10 MMOL/L (ref 9–17)
BASOPHILS # BLD: 0 % (ref 0–2)
BASOPHILS ABSOLUTE: <0.03 K/UL (ref 0–0.2)
BUN SERPL-MCNC: 15 MG/DL (ref 6–20)
BUN/CREAT BLD: 34 (ref 9–20)
CALCIUM SERPL-MCNC: 9.6 MG/DL (ref 8.6–10.4)
CHLORIDE SERPL-SCNC: 106 MMOL/L (ref 98–107)
CHOLEST SERPL-MCNC: 141 MG/DL
CHOLESTEROL/HDL RATIO: 2.8
CO2 SERPL-SCNC: 26 MMOL/L (ref 20–31)
CREAT SERPL-MCNC: 0.44 MG/DL (ref 0.5–0.9)
EKG ATRIAL RATE: 74 BPM
EKG P AXIS: 43 DEGREES
EKG P-R INTERVAL: 160 MS
EKG Q-T INTERVAL: 404 MS
EKG QRS DURATION: 94 MS
EKG QTC CALCULATION (BAZETT): 448 MS
EKG R AXIS: 17 DEGREES
EKG T AXIS: 24 DEGREES
EKG VENTRICULAR RATE: 74 BPM
EOSINOPHILS RELATIVE PERCENT: 1 % (ref 1–4)
GFR SERPL CREATININE-BSD FRML MDRD: >60 ML/MIN/1.73M2
GLUCOSE SERPL-MCNC: 114 MG/DL (ref 70–99)
HCT VFR BLD AUTO: 30.9 % (ref 36.3–47.1)
HDLC SERPL-MCNC: 51 MG/DL
HGB BLD-MCNC: 10.1 G/DL (ref 11.9–15.1)
IMMATURE GRANULOCYTES: 1 %
LDLC SERPL CALC-MCNC: 72 MG/DL (ref 0–130)
LYMPHOCYTES # BLD: 23 % (ref 24–43)
MAGNESIUM SERPL-MCNC: 2.2 MG/DL (ref 1.6–2.6)
MCH RBC QN AUTO: 28.3 PG (ref 25.2–33.5)
MCHC RBC AUTO-ENTMCNC: 32.7 G/DL (ref 28.4–34.8)
MCV RBC AUTO: 86.6 FL (ref 82.6–102.9)
MONOCYTES # BLD: 7 % (ref 3–12)
NRBC AUTOMATED: 0 PER 100 WBC
PDW BLD-RTO: 13.4 % (ref 11.8–14.4)
PLATELET # BLD AUTO: 303 K/UL (ref 138–453)
PMV BLD AUTO: 9.6 FL (ref 8.1–13.5)
POTASSIUM SERPL-SCNC: 4 MMOL/L (ref 3.7–5.3)
RBC # BLD: 3.57 M/UL (ref 3.95–5.11)
SEG NEUTROPHILS: 68 % (ref 36–65)
SEGMENTED NEUTROPHILS ABSOLUTE COUNT: 5.79 K/UL (ref 1.5–8.1)
SODIUM SERPL-SCNC: 142 MMOL/L (ref 135–144)
T4 FREE SERPL-MCNC: 1.1 NG/DL (ref 0.9–1.7)
TRIGL SERPL-MCNC: 89 MG/DL
WBC # BLD AUTO: 8.4 K/UL (ref 3.5–11.3)

## 2023-05-06 PROCEDURE — 80048 BASIC METABOLIC PNL TOTAL CA: CPT

## 2023-05-06 PROCEDURE — 6360000002 HC RX W HCPCS: Performed by: NURSE PRACTITIONER

## 2023-05-06 PROCEDURE — 94761 N-INVAS EAR/PLS OXIMETRY MLT: CPT

## 2023-05-06 PROCEDURE — 36415 COLL VENOUS BLD VENIPUNCTURE: CPT

## 2023-05-06 PROCEDURE — 80061 LIPID PANEL: CPT

## 2023-05-06 PROCEDURE — 1200000000 HC SEMI PRIVATE

## 2023-05-06 PROCEDURE — 85025 COMPLETE CBC W/AUTO DIFF WBC: CPT

## 2023-05-06 PROCEDURE — 93010 ELECTROCARDIOGRAM REPORT: CPT | Performed by: INTERNAL MEDICINE

## 2023-05-06 PROCEDURE — 83735 ASSAY OF MAGNESIUM: CPT

## 2023-05-06 PROCEDURE — 2580000003 HC RX 258: Performed by: NURSE PRACTITIONER

## 2023-05-06 PROCEDURE — 6370000000 HC RX 637 (ALT 250 FOR IP): Performed by: NURSE PRACTITIONER

## 2023-05-06 RX ADMIN — SODIUM CHLORIDE, PRESERVATIVE FREE 10 ML: 5 INJECTION INTRAVENOUS at 07:16

## 2023-05-06 RX ADMIN — ENOXAPARIN SODIUM 30 MG: 30 INJECTION SUBCUTANEOUS at 07:18

## 2023-05-06 RX ADMIN — HYDROCHLOROTHIAZIDE 25 MG: 25 TABLET ORAL at 07:16

## 2023-05-06 RX ADMIN — INDOMETHACIN 25 MG: 25 CAPSULE ORAL at 16:49

## 2023-05-06 RX ADMIN — VERAPAMIL HYDROCHLORIDE 360 MG: 180 TABLET, FILM COATED, EXTENDED RELEASE ORAL at 07:16

## 2023-05-06 RX ADMIN — INDOMETHACIN 25 MG: 25 CAPSULE ORAL at 07:16

## 2023-05-06 RX ADMIN — THERA TABS 1 TABLET: TAB at 07:16

## 2023-05-06 RX ADMIN — POTASSIUM CHLORIDE 20 MEQ: 1500 TABLET, EXTENDED RELEASE ORAL at 07:16

## 2023-05-06 RX ADMIN — PANTOPRAZOLE SODIUM 20 MG: 20 TABLET, DELAYED RELEASE ORAL at 07:16

## 2023-05-06 RX ADMIN — FUROSEMIDE 40 MG: 10 INJECTION, SOLUTION INTRAMUSCULAR; INTRAVENOUS at 07:16

## 2023-05-06 RX ADMIN — ATORVASTATIN CALCIUM 20 MG: 20 TABLET, FILM COATED ORAL at 20:53

## 2023-05-06 RX ADMIN — SODIUM CHLORIDE, PRESERVATIVE FREE 10 ML: 5 INJECTION INTRAVENOUS at 20:53

## 2023-05-06 RX ADMIN — COLCHICINE 0.6 MG: 0.6 TABLET ORAL at 07:16

## 2023-05-06 RX ADMIN — ENALAPRIL MALEATE 10 MG: 10 TABLET ORAL at 07:16

## 2023-05-06 RX ADMIN — COLCHICINE 0.6 MG: 0.6 TABLET ORAL at 20:53

## 2023-05-06 RX ADMIN — POTASSIUM CHLORIDE 20 MEQ: 1500 TABLET, EXTENDED RELEASE ORAL at 16:49

## 2023-05-06 RX ADMIN — ENOXAPARIN SODIUM 30 MG: 30 INJECTION SUBCUTANEOUS at 20:53

## 2023-05-06 ASSESSMENT — PAIN DESCRIPTION - LOCATION: LOCATION: CHEST

## 2023-05-06 ASSESSMENT — PAIN SCALES - GENERAL: PAINLEVEL_OUTOF10: 3

## 2023-05-06 ASSESSMENT — PAIN - FUNCTIONAL ASSESSMENT: PAIN_FUNCTIONAL_ASSESSMENT: ACTIVITIES ARE NOT PREVENTED

## 2023-05-06 ASSESSMENT — PAIN DESCRIPTION - ORIENTATION: ORIENTATION: MID

## 2023-05-06 ASSESSMENT — PAIN DESCRIPTION - DESCRIPTORS: DESCRIPTORS: ACHING

## 2023-05-07 VITALS
TEMPERATURE: 98.4 F | OXYGEN SATURATION: 100 % | SYSTOLIC BLOOD PRESSURE: 130 MMHG | HEIGHT: 60 IN | WEIGHT: 244.13 LBS | RESPIRATION RATE: 20 BRPM | DIASTOLIC BLOOD PRESSURE: 72 MMHG | BODY MASS INDEX: 47.93 KG/M2 | HEART RATE: 71 BPM

## 2023-05-07 LAB
ABSOLUTE EOS #: 0.12 K/UL (ref 0–0.44)
ABSOLUTE IMMATURE GRANULOCYTE: 0.04 K/UL (ref 0–0.3)
ABSOLUTE LYMPH #: 2.21 K/UL (ref 1.1–3.7)
ABSOLUTE MONO #: 0.61 K/UL (ref 0.1–1.2)
ALBUMIN SERPL-MCNC: 3.9 G/DL (ref 3.5–5.2)
ALBUMIN/GLOBULIN RATIO: 1.1 (ref 1–2.5)
ALP SERPL-CCNC: 219 U/L (ref 35–104)
ALT SERPL-CCNC: 88 U/L (ref 5–33)
ANION GAP SERPL CALCULATED.3IONS-SCNC: 9 MMOL/L (ref 9–17)
AST SERPL-CCNC: 33 U/L
BASOPHILS # BLD: 0 % (ref 0–2)
BASOPHILS ABSOLUTE: <0.03 K/UL (ref 0–0.2)
BILIRUB SERPL-MCNC: 0.4 MG/DL (ref 0.3–1.2)
BUN SERPL-MCNC: 19 MG/DL (ref 6–20)
BUN/CREAT BLD: 40 (ref 9–20)
CALCIUM SERPL-MCNC: 9.9 MG/DL (ref 8.6–10.4)
CHLORIDE SERPL-SCNC: 101 MMOL/L (ref 98–107)
CO2 SERPL-SCNC: 28 MMOL/L (ref 20–31)
CREAT SERPL-MCNC: 0.48 MG/DL (ref 0.5–0.9)
EOSINOPHILS RELATIVE PERCENT: 1 % (ref 1–4)
GFR SERPL CREATININE-BSD FRML MDRD: >60 ML/MIN/1.73M2
GLUCOSE SERPL-MCNC: 98 MG/DL (ref 70–99)
HCT VFR BLD AUTO: 32.5 % (ref 36.3–47.1)
HGB BLD-MCNC: 10.6 G/DL (ref 11.9–15.1)
IMMATURE GRANULOCYTES: 1 %
LYMPHOCYTES # BLD: 26 % (ref 24–43)
MAGNESIUM SERPL-MCNC: 2.1 MG/DL (ref 1.6–2.6)
MCH RBC QN AUTO: 28 PG (ref 25.2–33.5)
MCHC RBC AUTO-ENTMCNC: 32.6 G/DL (ref 28.4–34.8)
MCV RBC AUTO: 86 FL (ref 82.6–102.9)
MONOCYTES # BLD: 7 % (ref 3–12)
NRBC AUTOMATED: 0 PER 100 WBC
PDW BLD-RTO: 13.2 % (ref 11.8–14.4)
PLATELET # BLD AUTO: 357 K/UL (ref 138–453)
PMV BLD AUTO: 9.3 FL (ref 8.1–13.5)
POTASSIUM SERPL-SCNC: 4.1 MMOL/L (ref 3.7–5.3)
PROT SERPL-MCNC: 7.6 G/DL (ref 6.4–8.3)
RBC # BLD: 3.78 M/UL (ref 3.95–5.11)
SEG NEUTROPHILS: 65 % (ref 36–65)
SEGMENTED NEUTROPHILS ABSOLUTE COUNT: 5.64 K/UL (ref 1.5–8.1)
SODIUM SERPL-SCNC: 138 MMOL/L (ref 135–144)
WBC # BLD AUTO: 8.6 K/UL (ref 3.5–11.3)

## 2023-05-07 PROCEDURE — 2580000003 HC RX 258: Performed by: NURSE PRACTITIONER

## 2023-05-07 PROCEDURE — 36415 COLL VENOUS BLD VENIPUNCTURE: CPT

## 2023-05-07 PROCEDURE — 6370000000 HC RX 637 (ALT 250 FOR IP): Performed by: NURSE PRACTITIONER

## 2023-05-07 PROCEDURE — 83735 ASSAY OF MAGNESIUM: CPT

## 2023-05-07 PROCEDURE — 94761 N-INVAS EAR/PLS OXIMETRY MLT: CPT

## 2023-05-07 PROCEDURE — 80053 COMPREHEN METABOLIC PANEL: CPT

## 2023-05-07 PROCEDURE — 6360000002 HC RX W HCPCS: Performed by: NURSE PRACTITIONER

## 2023-05-07 PROCEDURE — 85025 COMPLETE CBC W/AUTO DIFF WBC: CPT

## 2023-05-07 RX ORDER — FUROSEMIDE 40 MG/1
40 TABLET ORAL DAILY
Qty: 14 TABLET | Refills: 0 | Status: SHIPPED | OUTPATIENT
Start: 2023-05-07 | End: 2023-05-21

## 2023-05-07 RX ADMIN — SODIUM CHLORIDE, PRESERVATIVE FREE 10 ML: 5 INJECTION INTRAVENOUS at 07:20

## 2023-05-07 RX ADMIN — HYDROCHLOROTHIAZIDE 25 MG: 25 TABLET ORAL at 07:20

## 2023-05-07 RX ADMIN — THERA TABS 1 TABLET: TAB at 07:21

## 2023-05-07 RX ADMIN — VERAPAMIL HYDROCHLORIDE 360 MG: 180 TABLET, FILM COATED, EXTENDED RELEASE ORAL at 07:20

## 2023-05-07 RX ADMIN — INDOMETHACIN 25 MG: 25 CAPSULE ORAL at 07:20

## 2023-05-07 RX ADMIN — COLCHICINE 0.6 MG: 0.6 TABLET ORAL at 07:20

## 2023-05-07 RX ADMIN — POTASSIUM CHLORIDE 20 MEQ: 1500 TABLET, EXTENDED RELEASE ORAL at 07:20

## 2023-05-07 RX ADMIN — FUROSEMIDE 40 MG: 10 INJECTION, SOLUTION INTRAMUSCULAR; INTRAVENOUS at 07:20

## 2023-05-07 RX ADMIN — PANTOPRAZOLE SODIUM 20 MG: 20 TABLET, DELAYED RELEASE ORAL at 07:20

## 2023-05-07 RX ADMIN — ENALAPRIL MALEATE 10 MG: 10 TABLET ORAL at 07:20

## 2023-05-08 ENCOUNTER — CARE COORDINATION (OUTPATIENT)
Dept: OTHER | Facility: CLINIC | Age: 53
End: 2023-05-08

## 2023-05-08 NOTE — CARE COORDINATION
greek yogurt and otc probiotics. Pt has not checked BP recently at home; encouraged to check daily and to report BP readings consistently >140/90 to provider. 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 Pt denies any further questions, needs or concerns; is agreeable to AC follow up outreach. Care Transition Nurse reviewed discharge instructions, medical action plan, and red flags with patient who verbalized understanding. The patient was given an opportunity to ask questions and does not have any further questions or concerns at this time. Were discharge instructions available to patient? Yes. Reviewed appropriate site of care based on symptoms and resources available to patient including: PCP  Specialist  Benefits related nurse triage line  Urgent care clinics  When to call [address] . The patient agrees to contact the PCP office for questions related to their healthcare. Advance Care Planning:   Does patient have an Advance Directive: not on file. Medication reconciliation was performed with patient, who verbalizes understanding of administration of home medications.  Medications reviewed, 1111F entered: N/A    Was patient discharged with a pulse oximeter? no    Non-face-to-face services provided:  Scheduled appointment with PCP-ACM scheduled appt for 5/12/23  Scheduled appointment with Specialist-Pt has f/u Echo on 5/11/23 and f/u with cardiology on 5/18/23  Obtained and reviewed discharge summary and/or continuity of care documents  Education of patient/family/caregiver/guardian to support self-management-educated pt on red flags and appropriate sites of care  Assessment and support for treatment adherence and medication management-reviewed/reconciled meds with pt; educated on discharge meds; pt denies needs for any refills    Care Transitions 24 Hour Call    Schedule Follow Up Appointment with PCP: Completed  Do you have a copy

## 2023-05-10 ENCOUNTER — CARE COORDINATION (OUTPATIENT)
Dept: CARE COORDINATION | Age: 53
End: 2023-05-10

## 2023-05-10 NOTE — CARE COORDINATION
Contacted [employer] and left voicemail regarding Dietitian follow up. Left call back number and will follow up as appropriate.          [address] [place] [address],   [phone]

## 2023-05-11 ENCOUNTER — HOSPITAL ENCOUNTER (OUTPATIENT)
Dept: NON INVASIVE DIAGNOSTICS | Age: 53
Discharge: HOME | End: 2023-05-11
Payer: COMMERCIAL

## 2023-05-11 DIAGNOSIS — I30.0 RECURRENT IDIOPATHIC PERICARDITIS: ICD-10-CM

## 2023-05-11 DIAGNOSIS — I10 ESSENTIAL HYPERTENSION: ICD-10-CM

## 2023-05-11 DIAGNOSIS — E78.2 MIXED HYPERLIPIDEMIA: ICD-10-CM

## 2023-05-11 DIAGNOSIS — E66.01 MORBID OBESITY (HCC): ICD-10-CM

## 2023-05-11 DIAGNOSIS — I34.1 MITRAL VALVE PROLAPSE: ICD-10-CM

## 2023-05-11 LAB
LV EF: 65 %
LVEF MODALITY: NORMAL

## 2023-05-11 PROCEDURE — 93308 TTE F-UP OR LMTD: CPT

## 2023-05-12 ENCOUNTER — TELEPHONE (OUTPATIENT)
Dept: CARDIOLOGY | Age: 53
End: 2023-05-12

## 2023-05-12 ENCOUNTER — CARE COORDINATION (OUTPATIENT)
Dept: OTHER | Facility: CLINIC | Age: 53
End: 2023-05-12

## 2023-05-12 PROBLEM — I30.0 ACUTE IDIOPATHIC PERICARDITIS: Status: ACTIVE | Noted: 2023-05-05

## 2023-05-12 NOTE — RESULT ENCOUNTER NOTE
Please let them know their echo looks good effusion has improved, will discuss at follow up appointment. Thanks.

## 2023-05-12 NOTE — TELEPHONE ENCOUNTER
----- Message from Jahaira Kan PA-C sent at 5/11/2023 10:58 PM EDT -----  Please let them know their echo looks good effusion has improved, will discuss at follow up appointment. Thanks.

## 2023-05-13 NOTE — CARE COORDINATION
[facility] [place] Call    Patient Current Location:  Home: [address]     Care Transition Nurse contacted the patient by telephone to follow up after admission on 23. Verified name and  with patient as identifiers. Patient: Hong Davis  Patient : 1970   MRN: [id number]  Reason for Admission: Acute pericarditis  Discharge Date: 23 RARS: Readmission Risk Score: 8.2      Needs to be reviewed by the provider   Additional needs identified to be addressed with provider: Yes  medications-pt needs clarification on how to take Lasix; if to take 40mg daily or to take half the dose daily              Method of communication with provider: chart routing. Pt reports she is doing better; denies any chest pain, dizziness, lightheadedness, palpitations, shortness of breath, fever/chills, cough, nausea/vomiting or swelling in lower extremities. Pt reports appetite is good and she is watching her sodium intake. Pt reports decreased episodes of diarrhea; pcp told pt to take immodium as needed. Pt checking BP at home; last reading today was 128/80; encouraged pt to keep log to take to provider visits. Pt reports weight on her scale at home is back down to 236, pre-hospital weight. . Educated to continue to weight self daily at same time with same amount of clothing and to notify provider of weight gain of >3 lbs/day or 3-5 lbs/week. Pt had f/u echo on 23, which showed improvement. Educated pt on getting potassium in foods that she eats and handout sent to pt. Pt denies any further questions, needs or concerns; is agreeable to [facility] follow up outreach. Addressed changes since last contact:  none  Discussed follow-up appointments. If no appointment was previously scheduled, appointment scheduling offered: Yes. Is follow up appointment scheduled within 7 days of discharge? Yes.     Follow Up  Future Appointments   Date Time Provider Arely Trujillo

## 2023-05-16 ENCOUNTER — CARE COORDINATION (OUTPATIENT)
Dept: CARE COORDINATION | Age: 53
End: 2023-05-16

## 2023-05-16 NOTE — CARE COORDINATION
Contacted [employer] and left voicemail regarding Dietitian follow up. Left call back number. RD spoke with patient for initial nutrition assessment on 3/27/23 and has been following up with patient. RD outreached 5/10/23 and today 5/16/23- left VM both outreaches. RD will notify ACM. RD will continue to follow/assist with patient return call.        [address] [place] [address],   [phone]

## 2023-05-18 ENCOUNTER — TELEPHONE (OUTPATIENT)
Dept: CARDIOLOGY | Age: 53
End: 2023-05-18

## 2023-05-18 ENCOUNTER — OFFICE VISIT (OUTPATIENT)
Dept: CARDIOLOGY | Age: 53
End: 2023-05-18

## 2023-05-18 ENCOUNTER — HOSPITAL ENCOUNTER (OUTPATIENT)
Age: 53
Discharge: HOME | End: 2023-05-18
Payer: COMMERCIAL

## 2023-05-18 VITALS
OXYGEN SATURATION: 98 % | RESPIRATION RATE: 18 BRPM | SYSTOLIC BLOOD PRESSURE: 137 MMHG | HEART RATE: 72 BPM | HEIGHT: 60 IN | BODY MASS INDEX: 47.55 KG/M2 | WEIGHT: 242.2 LBS | DIASTOLIC BLOOD PRESSURE: 80 MMHG

## 2023-05-18 DIAGNOSIS — E66.01 MORBID OBESITY (HCC): ICD-10-CM

## 2023-05-18 DIAGNOSIS — I34.1 MITRAL VALVE PROLAPSE: ICD-10-CM

## 2023-05-18 DIAGNOSIS — E78.2 MIXED HYPERLIPIDEMIA: ICD-10-CM

## 2023-05-18 DIAGNOSIS — I10 ESSENTIAL HYPERTENSION: ICD-10-CM

## 2023-05-18 DIAGNOSIS — I30.9 ACUTE PERICARDITIS, UNSPECIFIED TYPE: ICD-10-CM

## 2023-05-18 DIAGNOSIS — I31.39 PERICARDIAL EFFUSION WITHOUT CARDIAC TAMPONADE: ICD-10-CM

## 2023-05-18 DIAGNOSIS — R79.89 ELEVATED LFTS: ICD-10-CM

## 2023-05-18 DIAGNOSIS — I30.0 RECURRENT IDIOPATHIC PERICARDITIS: ICD-10-CM

## 2023-05-18 DIAGNOSIS — I10 PRIMARY HYPERTENSION: ICD-10-CM

## 2023-05-18 DIAGNOSIS — I31.39 PERICARDIAL EFFUSION: ICD-10-CM

## 2023-05-18 DIAGNOSIS — I10 BENIGN ESSENTIAL HTN: ICD-10-CM

## 2023-05-18 DIAGNOSIS — Z86.79 HISTORY OF MITRAL VALVE PROLAPSE: ICD-10-CM

## 2023-05-18 LAB
ALBUMIN SERPL-MCNC: 4.4 G/DL (ref 3.5–5.2)
ALBUMIN/GLOB SERPL: 1.3 {RATIO} (ref 1–2.5)
ALP SERPL-CCNC: 158 U/L (ref 35–104)
ALT SERPL-CCNC: 28 U/L (ref 5–33)
ANION GAP SERPL CALCULATED.3IONS-SCNC: 12 MMOL/L (ref 9–17)
AST SERPL-CCNC: 25 U/L
BILIRUB SERPL-MCNC: 0.2 MG/DL (ref 0.3–1.2)
BUN SERPL-MCNC: 18 MG/DL (ref 6–20)
BUN/CREAT SERPL: 32 (ref 9–20)
CALCIUM SERPL-MCNC: 10.1 MG/DL (ref 8.6–10.4)
CHLORIDE SERPL-SCNC: 102 MMOL/L (ref 98–107)
CO2 SERPL-SCNC: 25 MMOL/L (ref 20–31)
CREAT SERPL-MCNC: 0.57 MG/DL (ref 0.5–0.9)
CRP SERPL HS-MCNC: 27.7 MG/L (ref 0–5)
ERYTHROCYTE [SEDIMENTATION RATE] IN BLOOD BY WESTERGREN METHOD: 12 MM/HR (ref 0–30)
GFR SERPL CREATININE-BSD FRML MDRD: >60 ML/MIN/1.73M2
GLUCOSE SERPL-MCNC: 114 MG/DL (ref 70–99)
POTASSIUM SERPL-SCNC: 4 MMOL/L (ref 3.7–5.3)
PROT SERPL-MCNC: 7.8 G/DL (ref 6.4–8.3)
SODIUM SERPL-SCNC: 139 MMOL/L (ref 135–144)

## 2023-05-18 PROCEDURE — 85652 RBC SED RATE AUTOMATED: CPT

## 2023-05-18 PROCEDURE — 80053 COMPREHEN METABOLIC PANEL: CPT

## 2023-05-18 PROCEDURE — 36415 COLL VENOUS BLD VENIPUNCTURE: CPT

## 2023-05-18 PROCEDURE — 86140 C-REACTIVE PROTEIN: CPT

## 2023-05-18 NOTE — RESULT ENCOUNTER NOTE
Please notify patient that their lab results are improving, CRP is now 27.7. Please continue current treatment and follow up.

## 2023-05-18 NOTE — TELEPHONE ENCOUNTER
----- Message from Steven Ochoa PA-C sent at 5/18/2023 11:09 AM EDT -----  Please notify patient that their lab results are normal.   Please continue current treatment and follow up.

## 2023-05-18 NOTE — PROGRESS NOTES
from 5/3/2023. Continue Colchicine 0.6 mg BID for six weeks and then we will see her back in four weeks for continued follow up with Dr. Mardel Bence. STOP Protonix when finished with indomethacin. Trace Pericardial Effusion:   Seen on CT 2/16/2023   Echo done 5/3/2023: Small pericardial effusion  Repeat Echo done on 5/11/2023 showed EF of >65%. Mild left ventricular hypertrophy. Anterior free space consistent with trivial to small pericardial effusion with no evidence of tamponade physiology. Compared to the previous study of 5/5/2023, Pericardial effusion has improved. Continue Lasix 40 mg daily for the next 3 days. History of Mitral Valve Prolapse      Essential Hypertension: Controlled  Calcium Channel Blocker: Continue verapamil 360 mg once daily. ACE Inibitor/ARB: Continue enalapril (Vasotec) 10 mg daily  Diuretics: Continue Hydrochlorothiazide (HCTZ) 25 mg every morning. Hyperlipidemia: Mixed - Last LDL on 3/3/2023 was 91 mg/dL  Cholesterol Reduction Therapy: Continue simvastatin (Zocor) 40 mg daily. Morbid obesity: Body mass index is 47.3 kg/m². I also briefly discussed both diet and exercise strategies for her to continue to loses weight and she was very receptive to this. In the meantime, I encouraged Ms. Gill to continue to take her other medications. I discussed patient's symptoms and treatment plan with Dr Mardel Bence, he was in agreement with the plan and follow up. FOLLOW UP:   I told Ms. Gill to call my office if she had any problems, but otherwise I asked her to Return in about 4 weeks (around 6/15/2023). However, I would be happy to see her sooner should the need arise.      Sincerely,  Freeman Barajas PA-C  [facility] Cardiology Specialist    [address], [place], [address]  Phone: [phone], Fax: [phone]     I believe that the risk of significant morbidity and mortality related to the patient's current medical conditions are:

## 2023-05-26 ENCOUNTER — CARE COORDINATION (OUTPATIENT)
Dept: OTHER | Facility: CLINIC | Age: 53
End: 2023-05-26

## 2023-05-26 NOTE — CARE COORDINATION
Ambulatory Care Coordination Note    ACM attempted to reach patient for care management follow up call regarding post pericarditis. HIPAA compliant message left requesting a return phone call at patient convenience. Plan for follow-up call in 7-10 days    Future Appointments   Date Time Provider Arely Trujillo   6/19/2023  8:00 AM Radha Nguyen MD [facility]   9/11/2023  8:00 AM Nelson Davis MD [facility] Mariah Sahni MSN, RN   Ambulatory Care Manager  Associate Care Management  Cell [phone]  [email or web address]

## 2023-06-02 ENCOUNTER — CARE COORDINATION (OUTPATIENT)
Dept: OTHER | Facility: CLINIC | Age: 53
End: 2023-06-02

## 2023-06-02 NOTE — CARE COORDINATION
Ambulatory Care Coordination Note    ACM attempted to reach patient for Associate Care Management related to chest pain, cardiology f/u. No voicemail available, Lost to follow up letter via my chart. Plan for follow up call in 2 weeks. Future Appointments   Date Time Provider Arely Trujillo   6/19/2023  8:00 AM Fabby Currie MD [facility] CARD [facility]   9/11/2023  8:00 AM Musa Dennis MD [facility] Juice Sahni MSN, RN   Ambulatory Care Manager  Associate Care Management  Cell [phone]  [email or web address]

## 2023-06-19 ENCOUNTER — OFFICE VISIT (OUTPATIENT)
Dept: CARDIOLOGY | Age: 53
End: 2023-06-19
Payer: COMMERCIAL

## 2023-06-19 VITALS
DIASTOLIC BLOOD PRESSURE: 91 MMHG | BODY MASS INDEX: 47.12 KG/M2 | RESPIRATION RATE: 18 BRPM | HEART RATE: 83 BPM | HEIGHT: 60 IN | WEIGHT: 240 LBS | OXYGEN SATURATION: 98 % | SYSTOLIC BLOOD PRESSURE: 144 MMHG

## 2023-06-19 DIAGNOSIS — E66.01 MORBID OBESITY (HCC): ICD-10-CM

## 2023-06-19 DIAGNOSIS — I34.1 MITRAL VALVE PROLAPSE: ICD-10-CM

## 2023-06-19 DIAGNOSIS — I31.39 PERICARDIAL EFFUSION: ICD-10-CM

## 2023-06-19 DIAGNOSIS — I30.0 RECURRENT IDIOPATHIC PERICARDITIS: Primary | ICD-10-CM

## 2023-06-19 DIAGNOSIS — I10 PRIMARY HYPERTENSION: ICD-10-CM

## 2023-06-19 DIAGNOSIS — E78.2 MIXED HYPERLIPIDEMIA: ICD-10-CM

## 2023-06-19 PROCEDURE — 3077F SYST BP >= 140 MM HG: CPT | Performed by: INTERNAL MEDICINE

## 2023-06-19 PROCEDURE — 99214 OFFICE O/P EST MOD 30 MIN: CPT | Performed by: INTERNAL MEDICINE

## 2023-06-19 PROCEDURE — 3080F DIAST BP >= 90 MM HG: CPT | Performed by: INTERNAL MEDICINE

## 2023-06-19 NOTE — PROGRESS NOTES
Andres Jett am scribing for and in the presence of Nikki Ashby MD, F.A.C.C..     Patient: Dennis Lechuga  : 1970  Date of Visit: 2023    REASON FOR VISIT / CONSULTATION: Chest Pain (HX:pericarditis, CP. She has been doing much better. Lightheaded/dizziness on occasion with low BP at home, has since gone back up. ///Denies: CP, SOB, Palpitations. )    History of Present Illness:        Dear Martina Ahumada MD    I had the pleasure of seeing Ms. Gill today who is a 47 y.o. female who presents for evaluation of chest pain. Past cardiac medical history consist of hypertension for years and has been on medication. She does have hyperlipidemia and is on simvastatin. She denies history of thyroid disease or diabetes. No history of syncopal episodes. she is a nonsmoker. Her last known pericarditis was in 2023. Family cardiac history consist of her brother who had heart attack at age 44, her dad had 2 heart attacks, starting in his 52's - both have had open heart and stents placed. Her sister who has pacemaker as well. Chest CT  2023: Calcified coronary atheromatous plaque    EKG done in office 2023: Normal Sinus Rhythm, heart rate 66 bpm.    EKG done in office on 5/3/2023: Normal sinus rhythm heart rate 80 bpm.    Echo done on 2023: Global left ventricular systolic function appears preserved with an estimated ejection fraction of >65%. The left ventricular cavity size is within normal limits and the left ventricular wall thickness is mildly increased. No definite specific wall motion abnormalities were identified. No significant valvular abnormalities. Mild to moderate circumferential pericardial effusion. Mitral and tricuspid inflows show evidence of respiratory variation, this is a sensitive but not a specific sign of tamponade physiology. No evidence of right ventricular early diastolic collapse.  Clinical correlation indicated to rule out

## 2023-07-02 DIAGNOSIS — I34.1 MITRAL VALVE PROLAPSE: ICD-10-CM

## 2023-07-02 DIAGNOSIS — E78.2 MIXED HYPERLIPIDEMIA: ICD-10-CM

## 2023-07-02 DIAGNOSIS — I10 ESSENTIAL HYPERTENSION: ICD-10-CM

## 2023-07-02 DIAGNOSIS — E66.01 MORBID OBESITY (HCC): ICD-10-CM

## 2023-07-02 DIAGNOSIS — I30.0 RECURRENT IDIOPATHIC PERICARDITIS: ICD-10-CM

## 2023-07-05 RX ORDER — COLCHICINE 0.6 MG/1
TABLET ORAL
Qty: 60 TABLET | Refills: 0 | Status: SHIPPED | OUTPATIENT
Start: 2023-07-05 | End: 2023-08-15

## 2023-07-05 NOTE — TELEPHONE ENCOUNTER
Last time seeing Dr Anusha Rogers he instructed to continue colchocine for 6 weeks, I sent in a 4 week supply.

## 2023-07-11 ENCOUNTER — CARE COORDINATION (OUTPATIENT)
Dept: OTHER | Facility: CLINIC | Age: 53
End: 2023-07-11

## 2023-07-11 NOTE — CARE COORDINATION
Ambulatory Care Coordination Note    Associate Care Manager (ACM) attempted final outreach to patient, per pt request via my chart for this time of day, for follow up call regarding goal progress. Unable to leave HIPAA compliant message as pt voicemail is full. Multiple Lost to follow up letters sent to patient via my chart. Due to multiple attempts at outreach, no further outreach scheduled with this ACM, patient has this ACM's contact information if future needs arise. ACM will sign off care team at this time. Episode of Care resolved. Future Appointments   Date Time Provider [address]   7/27/2023  8:30 AM [facility] ECHO ROOM [facility] ECHO [place]   9/11/2023  8:00 AM MD Jeanne Lee   12/21/2023  8:20 AM [facility]      Samson REESE, RN   Ambulatory Care Manager  Associate Care Management  Cell [phone]  [email or web address]

## 2023-07-27 ENCOUNTER — TELEPHONE (OUTPATIENT)
Dept: CARDIOLOGY | Age: 53
End: 2023-07-27

## 2023-07-27 ENCOUNTER — HOSPITAL ENCOUNTER (OUTPATIENT)
Age: 53
Discharge: HOME | End: 2023-07-29
Attending: INTERNAL MEDICINE
Payer: COMMERCIAL

## 2023-07-27 VITALS
DIASTOLIC BLOOD PRESSURE: 91 MMHG | WEIGHT: 240 LBS | BODY MASS INDEX: 47.12 KG/M2 | SYSTOLIC BLOOD PRESSURE: 144 MMHG | HEIGHT: 60 IN

## 2023-07-27 DIAGNOSIS — I30.0 RECURRENT IDIOPATHIC PERICARDITIS: ICD-10-CM

## 2023-07-27 DIAGNOSIS — E78.2 MIXED HYPERLIPIDEMIA: ICD-10-CM

## 2023-07-27 DIAGNOSIS — I10 PRIMARY HYPERTENSION: ICD-10-CM

## 2023-07-27 DIAGNOSIS — I31.39 PERICARDIAL EFFUSION: ICD-10-CM

## 2023-07-27 DIAGNOSIS — I34.1 MITRAL VALVE PROLAPSE: ICD-10-CM

## 2023-07-27 LAB
ECHO BSA: 2.15 M2
ECHO LA MAJOR AXIS: 5.4 CM
ECHO LA VOL 2C: 43 ML (ref 22–52)
ECHO LA VOL 4C: 57 ML (ref 22–52)
ECHO LA VOL BP: 51 ML (ref 22–52)
ECHO LA VOL/BSA BIPLANE: 25 ML/M2 (ref 16–34)
ECHO LA VOLUME AREA LENGTH: 26 MILLILITERS PER SQUARE METER
ECHO LA VOLUME AREA LENGTH: 53 ML
ECHO LA VOLUME INDEX A2C: 21 ML/M2 (ref 16–34)
ECHO LA VOLUME INDEX A4C: 28 ML/M2 (ref 16–34)
ECHO LV EDV A2C: 85 ML
ECHO LV EDV A4C: 95 ML
ECHO LV EDV INDEX A4C: 47 ML/M2
ECHO LV EDV NDEX A2C: 42 ML/M2
ECHO LV EJECTION FRACTION BIPLANE: 70 % (ref 55–100)
ECHO LV ESV A2C: 29 ML
ECHO LV ESV A4C: 26 ML
ECHO LV ESV INDEX A2C: 14 ML/M2
ECHO LV ESV INDEX A4C: 13 ML/M2
ECHO LV FRACTIONAL SHORTENING: 31 % (ref 28–44)
ECHO LV INTERNAL DIMENSION DIASTOLE INDEX: 2.43 CM/M2
ECHO LV INTERNAL DIMENSION DIASTOLIC: 4.9 CM (ref 3.9–5.3)
ECHO LV INTERNAL DIMENSION SYSTOLIC INDEX: 1.68 CM/M2
ECHO LV INTERNAL DIMENSION SYSTOLIC: 3.4 CM
ECHO LV IVSD: 1.1 CM (ref 0.6–0.9)
ECHO LV IVSS: 1.3 CM
ECHO LV MASS 2D: 200.5 G (ref 67–162)
ECHO LV MASS INDEX 2D: 99.3 G/M2 (ref 43–95)
ECHO LV POSTERIOR WALL DIASTOLIC: 1.1 CM (ref 0.6–0.9)
ECHO LV POSTERIOR WALL SYSTOLIC: 1.1 CM
ECHO LV RELATIVE WALL THICKNESS RATIO: 0.45

## 2023-07-27 PROCEDURE — 93308 TTE F-UP OR LMTD: CPT

## 2023-07-27 NOTE — TELEPHONE ENCOUNTER
----- Message from Wen Hankins MD sent at 7/27/2023  2:23 PM EDT -----  Echo is good. No pericardial effusion. Continue current therapy and follow-up. Please call with questions and/or concerns.

## 2023-07-31 ENCOUNTER — TELEPHONE (OUTPATIENT)
Dept: CARDIOLOGY | Age: 53
End: 2023-07-31

## 2023-07-31 NOTE — TELEPHONE ENCOUNTER
Pt states she was told she could stop her colchicine after 6 weeks and she wanted to know if this is okay to just stop or if she has to wean off this?

## 2023-08-02 NOTE — TELEPHONE ENCOUNTER
She has recurrent pericarditis and stopping colchicine after only 6-week therapy carries a risk of recurrence. The minimum duration of therapy in 3 months. Please call with questions and/or concerns.   Thank you

## 2023-08-11 RX ORDER — COLCHICINE 0.6 MG/1
0.6 TABLET ORAL DAILY
Qty: 180 TABLET | Refills: 0 | Status: SHIPPED | OUTPATIENT
Start: 2023-08-11 | End: 2024-02-07

## 2023-08-15 RX ORDER — COLCHICINE 0.6 MG/1
0.6 TABLET ORAL 2 TIMES DAILY
Qty: 180 TABLET | Refills: 0 | Status: SHIPPED | OUTPATIENT
Start: 2023-08-15 | End: 2023-11-13

## 2023-08-15 RX ORDER — COLCHICINE 0.6 MG/1
0.6 TABLET ORAL 2 TIMES DAILY
Qty: 180 TABLET | Refills: 3 | OUTPATIENT
Start: 2023-08-15 | End: 2024-02-11

## 2023-08-21 ENCOUNTER — TELEPHONE (OUTPATIENT)
Dept: CARDIOLOGY | Age: 53
End: 2023-08-21

## 2023-08-21 ENCOUNTER — HOSPITAL ENCOUNTER (OUTPATIENT)
Age: 53
Discharge: HOME | End: 2023-08-21
Payer: COMMERCIAL

## 2023-08-21 DIAGNOSIS — I31.39 PERICARDIAL EFFUSION: ICD-10-CM

## 2023-08-21 DIAGNOSIS — Z00.00 WELLNESS EXAMINATION: ICD-10-CM

## 2023-08-21 DIAGNOSIS — E78.2 MIXED HYPERLIPIDEMIA: ICD-10-CM

## 2023-08-21 DIAGNOSIS — I34.1 MITRAL VALVE PROLAPSE: ICD-10-CM

## 2023-08-21 DIAGNOSIS — I10 PRIMARY HYPERTENSION: ICD-10-CM

## 2023-08-21 DIAGNOSIS — I30.0 RECURRENT IDIOPATHIC PERICARDITIS: ICD-10-CM

## 2023-08-21 LAB
ALBUMIN SERPL-MCNC: 4.5 G/DL (ref 3.5–5.2)
ALBUMIN/GLOB SERPL: 1.4 {RATIO} (ref 1–2.5)
ALP SERPL-CCNC: 117 U/L (ref 35–104)
ALT SERPL-CCNC: 24 U/L (ref 5–33)
ANION GAP SERPL CALCULATED.3IONS-SCNC: 11 MMOL/L (ref 9–17)
AST SERPL-CCNC: 21 U/L
BILIRUB SERPL-MCNC: 0.7 MG/DL (ref 0.3–1.2)
BUN SERPL-MCNC: 13 MG/DL (ref 6–20)
BUN/CREAT SERPL: 19 (ref 9–20)
CALCIUM SERPL-MCNC: 9.9 MG/DL (ref 8.6–10.4)
CHLORIDE SERPL-SCNC: 104 MMOL/L (ref 98–107)
CHOLEST SERPL-MCNC: 178 MG/DL
CHOLESTEROL/HDL RATIO: 3.4
CO2 SERPL-SCNC: 26 MMOL/L (ref 20–31)
CREAT SERPL-MCNC: 0.7 MG/DL (ref 0.5–0.9)
CRP SERPL HS-MCNC: 10.5 MG/L (ref 0–5)
ERYTHROCYTE [SEDIMENTATION RATE] IN BLOOD BY PHOTOMETRIC METHOD: 7 MM/HR (ref 0–30)
GFR SERPL CREATININE-BSD FRML MDRD: >60 ML/MIN/1.73M2
GLUCOSE P FAST SERPL-MCNC: 109 MG/DL (ref 70–99)
HDLC SERPL-MCNC: 53 MG/DL
LDLC SERPL CALC-MCNC: 94 MG/DL (ref 0–130)
POTASSIUM SERPL-SCNC: 4.1 MMOL/L (ref 3.7–5.3)
PROT SERPL-MCNC: 7.8 G/DL (ref 6.4–8.3)
SODIUM SERPL-SCNC: 141 MMOL/L (ref 135–144)
TRIGL SERPL-MCNC: 153 MG/DL

## 2023-08-21 PROCEDURE — 80053 COMPREHEN METABOLIC PANEL: CPT

## 2023-08-21 PROCEDURE — 86140 C-REACTIVE PROTEIN: CPT

## 2023-08-21 PROCEDURE — 80061 LIPID PANEL: CPT

## 2023-08-21 PROCEDURE — 36415 COLL VENOUS BLD VENIPUNCTURE: CPT

## 2023-08-21 PROCEDURE — 85652 RBC SED RATE AUTOMATED: CPT

## 2023-08-21 NOTE — TELEPHONE ENCOUNTER
----- Message from Galina Patel MD sent at 8/21/2023  9:41 AM EDT -----  CRP is much better but still not normal.  Continue colchicine.   Thank you

## 2023-10-03 ENCOUNTER — HOSPITAL ENCOUNTER (EMERGENCY)
Age: 53
Discharge: HOME | End: 2023-10-03
Attending: STUDENT IN AN ORGANIZED HEALTH CARE EDUCATION/TRAINING PROGRAM
Payer: COMMERCIAL

## 2023-10-03 ENCOUNTER — NURSE TRIAGE (OUTPATIENT)
Dept: OTHER | Facility: CLINIC | Age: 53
End: 2023-10-03

## 2023-10-03 VITALS
RESPIRATION RATE: 16 BRPM | HEIGHT: 60 IN | OXYGEN SATURATION: 99 % | SYSTOLIC BLOOD PRESSURE: 172 MMHG | DIASTOLIC BLOOD PRESSURE: 88 MMHG | TEMPERATURE: 98.5 F | HEART RATE: 94 BPM | BODY MASS INDEX: 46.87 KG/M2

## 2023-10-03 DIAGNOSIS — R31.9 URINARY TRACT INFECTION WITH HEMATURIA, SITE UNSPECIFIED: Primary | ICD-10-CM

## 2023-10-03 DIAGNOSIS — N39.0 URINARY TRACT INFECTION WITH HEMATURIA, SITE UNSPECIFIED: Primary | ICD-10-CM

## 2023-10-03 PROCEDURE — 99282 EMERGENCY DEPT VISIT SF MDM: CPT

## 2023-10-03 ASSESSMENT — LIFESTYLE VARIABLES
HOW MANY STANDARD DRINKS CONTAINING ALCOHOL DO YOU HAVE ON A TYPICAL DAY: PATIENT DOES NOT DRINK
HOW OFTEN DO YOU HAVE A DRINK CONTAINING ALCOHOL: NEVER

## 2023-10-04 NOTE — ED PROVIDER NOTES
[facility] ED  Emergency Department Encounter  Emergency Medicine Attending     Pt Name:Claudia Gill  MRN: [id number]  [address] [place] 1970  Date of evaluation: 10/3/23  PCP:  Sofi Russell MD  Note Started: 10:17 PM EDT      CHIEF COMPLAINT       Chief Complaint   Patient presents with    Urinary Tract Infection     Pt states she was at urgent care earlier and diagnosed with a UTI. Hypertension     Pt was also told she had high blood pressure and was dehydrated. HISTORY OF PRESENT ILLNESS  (Location/Symptom, Timing/Onset, Context/Setting, Quality, Duration, Modifying Factors, Severity.)      Suha Leone is a 47 y.o. female who presents with concerns about high blood pressure in addition with a urinary tract infection. Patient went to a urgent care today where they found her to be hypertensive in addition with having nitrate positive urine concerning for urinary tract infection patient was started on Keflex. Patient was recommended to come into the emergency department for evaluation secondary to having elevated blood pressure and a UTI. PAST MEDICAL / SURGICAL / SOCIAL / FAMILY HISTORY      has a past medical history of Hypertension, Morbid obesity with BMI of 45.0-49.9, adult ([address]), MVP (mitral valve prolapse), Pericardial effusion, Pericarditis, and PONV (postoperative nausea and vomiting). has a past surgical history that includes Cholecystectomy; Umbilical hernia repair (2003); Cystoscopy; Dilation and curettage of uterus; Endometrial ablation; hernia repair; Colonoscopy (11/15/2021); Colonoscopy (N/A, 11/15/2021); Hemorrhoid surgery (N/A, 11/15/2021); Ankle fracture surgery (Right); and Ankle fracture surgery (Right, 2/18/2022).       Social History     Socioeconomic History    Marital status:      Spouse name: Not on file    Number of children: Not on file    Years of education: Not on file    Highest education level: Not on file   Occupational

## 2023-10-04 NOTE — TELEPHONE ENCOUNTER
Location of patient: [place]    Subjective: Caller states UTI symptoms, bp was 202/127 at [facility], came down a little while at [facility], heart rate 104, bp is sill up, started abx for bladder infection, 163/60, blood in urine, protein in urine at [facility], UC recommended going to the ED,     Current Symptoms: blood in urine, feeling shaky, elevated hr and elevated blood pressure no missed doses of medication, pain with urination    Onset: today a few hours ago    Associated Symptoms: NA    Pain Severity: constant feeling of pain and pressure worse with urination    Temperature: no thermometer available by unknown method    What has been tried: went [facility], they recoomended ER    LMP: NA Pregnant: No    Recommended disposition: See HCP within 4 Hours (or PCP triage)    Care advice provided, patient verbalizes understanding; denies any other questions or concerns; instructed to call back for any new or worsening symptoms. Patient/caller agrees to proceed to the Emergency Department to be seen by a HCP within 4 hours. Attention Provider: Thank you for allowing me to participate in the care of your patient. The patient was connected to triage in response to symptoms provided. Please do not respond through this encounter as the response is not directed to a shared pool.     Reason for Disposition   Systolic BP  >= 738 OR Diastolic >= 201   [9] Pain or burning with passing urine AND [2] side (flank) or back pain present    Protocols used: Blood Pressure - High-ADULT-AH, Urine - Blood In-ADULT-

## 2024-01-17 ENCOUNTER — HOSPITAL ENCOUNTER (EMERGENCY)
Age: 54
Discharge: HOME | End: 2024-01-17
Attending: EMERGENCY MEDICINE
Payer: COMMERCIAL

## 2024-01-17 ENCOUNTER — APPOINTMENT (OUTPATIENT)
Dept: CT IMAGING | Age: 54
End: 2024-01-17
Attending: EMERGENCY MEDICINE
Payer: COMMERCIAL

## 2024-01-17 VITALS
DIASTOLIC BLOOD PRESSURE: 93 MMHG | OXYGEN SATURATION: 100 % | RESPIRATION RATE: 18 BRPM | SYSTOLIC BLOOD PRESSURE: 163 MMHG | HEART RATE: 90 BPM

## 2024-01-17 DIAGNOSIS — N30.01 ACUTE CYSTITIS WITH HEMATURIA: Primary | ICD-10-CM

## 2024-01-17 LAB
ALBUMIN SERPL-MCNC: 4.7 G/DL (ref 3.5–5.2)
ALBUMIN/GLOB SERPL: 1.3 {RATIO} (ref 1–2.5)
ALP SERPL-CCNC: 117 U/L (ref 35–104)
ALT SERPL-CCNC: 26 U/L (ref 5–33)
ANION GAP SERPL CALCULATED.3IONS-SCNC: 12 MMOL/L (ref 9–17)
AST SERPL-CCNC: 23 U/L
BACTERIA URNS QL MICRO: ABNORMAL
BASOPHILS # BLD: 0.05 K/UL (ref 0–0.2)
BASOPHILS NFR BLD: 0 % (ref 0–2)
BILIRUB SERPL-MCNC: 0.5 MG/DL (ref 0.3–1.2)
BILIRUB UR QL STRIP: NEGATIVE
BUN SERPL-MCNC: 18 MG/DL (ref 6–20)
BUN/CREAT SERPL: 30 (ref 9–20)
CALCIUM SERPL-MCNC: 10 MG/DL (ref 8.6–10.4)
CHLORIDE SERPL-SCNC: 97 MMOL/L (ref 98–107)
CLARITY UR: ABNORMAL
CO2 SERPL-SCNC: 27 MMOL/L (ref 20–31)
COLOR UR: ABNORMAL
CREAT SERPL-MCNC: 0.6 MG/DL (ref 0.5–0.9)
EOSINOPHIL # BLD: 0.13 K/UL (ref 0–0.44)
EOSINOPHILS RELATIVE PERCENT: 1 % (ref 1–4)
EPI CELLS #/AREA URNS HPF: ABNORMAL /HPF (ref 0–25)
ERYTHROCYTE [DISTWIDTH] IN BLOOD BY AUTOMATED COUNT: 12.9 % (ref 11.8–14.4)
GFR SERPL CREATININE-BSD FRML MDRD: >60 ML/MIN/1.73M2
GLUCOSE SERPL-MCNC: 109 MG/DL (ref 70–99)
GLUCOSE UR STRIP-MCNC: NEGATIVE MG/DL
HCT VFR BLD AUTO: 39.8 % (ref 36.3–47.1)
HGB BLD-MCNC: 13 G/DL (ref 11.9–15.1)
HGB UR QL STRIP.AUTO: ABNORMAL
IMM GRANULOCYTES # BLD AUTO: 0.06 K/UL (ref 0–0.3)
IMM GRANULOCYTES NFR BLD: 1 %
KETONES UR STRIP-MCNC: NEGATIVE MG/DL
LEUKOCYTE ESTERASE UR QL STRIP: ABNORMAL
LYMPHOCYTES NFR BLD: 3.56 K/UL (ref 1.1–3.7)
LYMPHOCYTES RELATIVE PERCENT: 27 % (ref 24–43)
MCH RBC QN AUTO: 28.4 PG (ref 25.2–33.5)
MCHC RBC AUTO-ENTMCNC: 32.7 G/DL (ref 28.4–34.8)
MCV RBC AUTO: 86.9 FL (ref 82.6–102.9)
MONOCYTES NFR BLD: 0.84 K/UL (ref 0.1–1.2)
MONOCYTES NFR BLD: 6 % (ref 3–12)
NEUTROPHILS NFR BLD: 65 % (ref 36–65)
NEUTS SEG NFR BLD: 8.68 K/UL (ref 1.5–8.1)
NITRITE UR QL STRIP: NEGATIVE
NRBC BLD-RTO: 0 PER 100 WBC
PH UR STRIP: 7 [PH] (ref 5–9)
PLATELET # BLD AUTO: 325 K/UL (ref 138–453)
PMV BLD AUTO: 9.6 FL (ref 8.1–13.5)
POTASSIUM SERPL-SCNC: 4 MMOL/L (ref 3.7–5.3)
PROT SERPL-MCNC: 8.2 G/DL (ref 6.4–8.3)
PROT UR STRIP-MCNC: ABNORMAL MG/DL
RBC # BLD AUTO: 4.58 M/UL (ref 3.95–5.11)
RBC #/AREA URNS HPF: ABNORMAL /HPF (ref 0–2)
SODIUM SERPL-SCNC: 136 MMOL/L (ref 135–144)
SP GR UR STRIP: 1.02 (ref 1.01–1.02)
UROBILINOGEN UR STRIP-ACNC: NORMAL EU/DL (ref 0–1)
WBC #/AREA URNS HPF: ABNORMAL /HPF (ref 0–5)
WBC OTHER # BLD: 13.3 K/UL (ref 3.5–11.3)

## 2024-01-17 PROCEDURE — 6360000002 HC RX W HCPCS: Performed by: EMERGENCY MEDICINE

## 2024-01-17 PROCEDURE — 81001 URINALYSIS AUTO W/SCOPE: CPT

## 2024-01-17 PROCEDURE — 85025 COMPLETE CBC W/AUTO DIFF WBC: CPT

## 2024-01-17 PROCEDURE — 2580000003 HC RX 258: Performed by: EMERGENCY MEDICINE

## 2024-01-17 PROCEDURE — 6370000000 HC RX 637 (ALT 250 FOR IP): Performed by: EMERGENCY MEDICINE

## 2024-01-17 PROCEDURE — 36415 COLL VENOUS BLD VENIPUNCTURE: CPT

## 2024-01-17 PROCEDURE — 80053 COMPREHEN METABOLIC PANEL: CPT

## 2024-01-17 PROCEDURE — 74176 CT ABD & PELVIS W/O CONTRAST: CPT

## 2024-01-17 PROCEDURE — 99284 EMERGENCY DEPT VISIT MOD MDM: CPT

## 2024-01-17 PROCEDURE — 96374 THER/PROPH/DIAG INJ IV PUSH: CPT

## 2024-01-17 RX ORDER — 0.9 % SODIUM CHLORIDE 0.9 %
1000 INTRAVENOUS SOLUTION INTRAVENOUS ONCE
Status: COMPLETED | OUTPATIENT
Start: 2024-01-17 | End: 2024-01-17

## 2024-01-17 RX ORDER — PHENAZOPYRIDINE HYDROCHLORIDE 100 MG/1
100 TABLET, FILM COATED ORAL 3 TIMES DAILY PRN
Qty: 9 TABLET | Refills: 0 | Status: SHIPPED | OUTPATIENT
Start: 2024-01-17 | End: 2024-01-20

## 2024-01-17 RX ORDER — SULFAMETHOXAZOLE AND TRIMETHOPRIM 800; 160 MG/1; MG/1
1 TABLET ORAL 2 TIMES DAILY
Qty: 20 TABLET | Refills: 0 | Status: SHIPPED | OUTPATIENT
Start: 2024-01-17 | End: 2024-01-27

## 2024-01-17 RX ORDER — KETOROLAC TROMETHAMINE 30 MG/ML
30 INJECTION, SOLUTION INTRAMUSCULAR; INTRAVENOUS ONCE
Status: COMPLETED | OUTPATIENT
Start: 2024-01-17 | End: 2024-01-17

## 2024-01-17 RX ORDER — SULFAMETHOXAZOLE AND TRIMETHOPRIM 800; 160 MG/1; MG/1
1 TABLET ORAL ONCE
Status: COMPLETED | OUTPATIENT
Start: 2024-01-17 | End: 2024-01-17

## 2024-01-17 RX ORDER — PHENAZOPYRIDINE HYDROCHLORIDE 100 MG/1
200 TABLET, FILM COATED ORAL
Status: DISCONTINUED | OUTPATIENT
Start: 2024-01-17 | End: 2024-01-17 | Stop reason: HOSPADM

## 2024-01-17 RX ADMIN — SULFAMETHOXAZOLE AND TRIMETHOPRIM 1 TABLET: 800; 160 TABLET ORAL at 09:55

## 2024-01-17 RX ADMIN — PHENAZOPYRIDINE 200 MG: 100 TABLET ORAL at 09:55

## 2024-01-17 RX ADMIN — SODIUM CHLORIDE 1000 ML: 9 INJECTION, SOLUTION INTRAVENOUS at 07:58

## 2024-01-17 RX ADMIN — KETOROLAC TROMETHAMINE 30 MG: 30 INJECTION, SOLUTION INTRAMUSCULAR; INTRAVENOUS at 07:58

## 2024-01-17 ASSESSMENT — PAIN DESCRIPTION - LOCATION: LOCATION: PELVIS

## 2024-01-17 ASSESSMENT — LIFESTYLE VARIABLES: HOW OFTEN DO YOU HAVE A DRINK CONTAINING ALCOHOL: MONTHLY OR LESS

## 2024-01-17 ASSESSMENT — PAIN SCALES - GENERAL
PAINLEVEL_OUTOF10: 5
PAINLEVEL_OUTOF10: 5

## 2024-01-17 ASSESSMENT — PAIN - FUNCTIONAL ASSESSMENT: PAIN_FUNCTIONAL_ASSESSMENT: 0-10

## 2024-01-17 NOTE — ED PROVIDER NOTES
[facility] ED  EMERGENCY DEPARTMENT ENCOUNTER      Pt Name: Claudia Gill  MRN: [id number]  Birthdate 1970  Date of evaluation: 1/17/2024  Provider: Brittany Trevino MD    CHIEF COMPLAINT       Chief Complaint   Patient presents with    Hematuria     Pressure during urination, burning, increased frequency          HISTORY OF PRESENT ILLNESS   (Location/Symptom, Timing/Onset, Context/Setting, Quality, Duration, Modifying Factors, Severity)  Note limiting factors.   Claudia Gill is a 53 y.o. female who presents to the emergency department      53-year-old female presented to emergency department for evaluation of blood in her urine pressure with urination and increased urinary frequency.  Patient does have some discomfort left last few days it became intense this morning.  Denies any fevers or chills.  No nausea or vomiting.  Lower pelvic pressure and some mild back pain.  No previous history of kidney stones.  Patient states she states she has had a few urinary tract infections over the past few months.  She has not tried anything at home for relief.  No diarrhea or constipation.  No vaginal bleeding.  No other acute concerns        Nursing Notes were reviewed.    REVIEW OF SYSTEMS    (2-9 systems for level 4, 10 or more for level 5)     Review of Systems   All other systems reviewed and are negative.      Except as noted above the remainder of the review of systems was reviewed and negative.       PAST MEDICAL HISTORY     Past Medical History:   Diagnosis Date    Hypertension     Morbid obesity with BMI of 45.0-49.9, adult (HCC)     MVP (mitral valve prolapse)     Pericardial effusion 5/5/2023    Pericarditis     PONV (postoperative nausea and vomiting)     after gallbladder surgery         SURGICAL HISTORY       Past Surgical History:   Procedure Laterality Date    ANKLE FRACTURE SURGERY Right     ANKLE FRACTURE SURGERY Right 2/18/2022    ANKLE OPEN REDUCTION INTERNAL FIXATION-DISTAL FIBULA

## 2024-01-17 NOTE — DISCHARGE INSTRUCTIONS
Make sure that you drink plenty of water.  Take Bactrim as directed until complete.  Pyridium every 8 hours as needed for bladder spasms.  May also use Motrin as needed and directed for pain.  Rest at home.  Follow-up with your primary care provider within 1 week for reevaluation.  Return immediately she develop any worsening pain fevers chills or any other acute concerns

## 2024-01-25 ENCOUNTER — TELEPHONE (OUTPATIENT)
Dept: CARDIOLOGY | Age: 54
End: 2024-01-25

## 2024-01-25 NOTE — TELEPHONE ENCOUNTER
Ms. Gill called in this morning reporting she started having that tightness and same feeling in her chest as she did when she had the pericarditis. She does have some left over colchicine and she wanted to know if you thought she should start taking that again? It looks like you ordered a limited echo at her last visit in December, should we see if this can get done this week? Please advise.

## 2024-01-26 DIAGNOSIS — I30.0 IDIOPATHIC PERICARDITIS, UNSPECIFIED CHRONICITY: Primary | ICD-10-CM

## 2024-01-26 DIAGNOSIS — I30.0 RECURRENT IDIOPATHIC PERICARDITIS: ICD-10-CM

## 2024-01-26 DIAGNOSIS — I31.39 PERICARDIAL EFFUSION: ICD-10-CM

## 2024-01-26 NOTE — TELEPHONE ENCOUNTER
Called Ms. PaniaguashandaRoxana and let her know this, she reports she really doesn't have time to make it in today and wanted to come Monday. I checked with Dr. Corbin to make sure if didn't have any reasons to wait that long - he said that would be okay. As far as the colchicine, he prefer her not to take because it can mask what her lab results truly are. She verbalized understanding.

## 2024-01-26 NOTE — TELEPHONE ENCOUNTER
Please let us see her tomorrow to get an ECG and blood work before treating her for recurrent pericarditis.  Thank you

## 2024-01-29 ENCOUNTER — TELEPHONE (OUTPATIENT)
Dept: CARDIOLOGY | Age: 54
End: 2024-01-29

## 2024-01-29 ENCOUNTER — HOSPITAL ENCOUNTER (OUTPATIENT)
Age: 54
Discharge: HOME | End: 2024-01-29
Payer: COMMERCIAL

## 2024-01-29 ENCOUNTER — HOSPITAL ENCOUNTER (OUTPATIENT)
Age: 54
Discharge: HOME | End: 2024-01-31
Payer: COMMERCIAL

## 2024-01-29 ENCOUNTER — OFFICE VISIT (OUTPATIENT)
Dept: CARDIOLOGY | Age: 54
End: 2024-01-29
Payer: COMMERCIAL

## 2024-01-29 VITALS
BODY MASS INDEX: 51.51 KG/M2 | HEART RATE: 69 BPM | RESPIRATION RATE: 18 BRPM | DIASTOLIC BLOOD PRESSURE: 87 MMHG | SYSTOLIC BLOOD PRESSURE: 173 MMHG | WEIGHT: 262.4 LBS | HEIGHT: 60 IN

## 2024-01-29 VITALS
HEIGHT: 60 IN | BODY MASS INDEX: 50.64 KG/M2 | SYSTOLIC BLOOD PRESSURE: 163 MMHG | WEIGHT: 257.94 LBS | DIASTOLIC BLOOD PRESSURE: 93 MMHG

## 2024-01-29 DIAGNOSIS — I30.0 RECURRENT IDIOPATHIC PERICARDITIS: ICD-10-CM

## 2024-01-29 DIAGNOSIS — I30.0 RECURRENT IDIOPATHIC PERICARDITIS: Primary | ICD-10-CM

## 2024-01-29 DIAGNOSIS — I31.39 PERICARDIAL EFFUSION: ICD-10-CM

## 2024-01-29 DIAGNOSIS — E78.2 MIXED HYPERLIPIDEMIA: ICD-10-CM

## 2024-01-29 DIAGNOSIS — I10 PRIMARY HYPERTENSION: ICD-10-CM

## 2024-01-29 DIAGNOSIS — I30.0 IDIOPATHIC PERICARDITIS, UNSPECIFIED CHRONICITY: ICD-10-CM

## 2024-01-29 DIAGNOSIS — E66.01 MORBID OBESITY (HCC): ICD-10-CM

## 2024-01-29 LAB
CRP SERPL HS-MCNC: 15.3 MG/L (ref 0–5)
ECHO AO ROOT DIAM: 2.9 CM
ECHO AO ROOT INDEX: 1.39 CM/M2
ECHO AV ACCELERATION TIME: 61.7 MS
ECHO AV CUSP MM: 2.1 CM
ECHO AV MEAN GRADIENT: 5 MMHG
ECHO AV MEAN VELOCITY: 1 M/S
ECHO AV PEAK VELOCITY: 1.4 M/S
ECHO AV VTI: 29.8 CM
ECHO BSA: 2.23 M2
ECHO LA DIAMETER INDEX: 1.97 CM/M2
ECHO LA DIAMETER: 4.1 CM
ECHO LA MAJOR AXIS: 5.2 CM
ECHO LA TO AORTIC ROOT RATIO: 1.41
ECHO LA VOL BP: 45 ML (ref 22–52)
ECHO LA VOL MOD A2C: 55 ML (ref 22–52)
ECHO LA VOL MOD A4C: 35 ML (ref 22–52)
ECHO LA VOL/BSA BIPLANE: 22 ML/M2 (ref 16–34)
ECHO LA VOLUME AREA LENGTH: 48 ML
ECHO LA VOLUME INDEX AREA LENGTH: 23 ML/M2 (ref 16–34)
ECHO LA VOLUME INDEX MOD A2C: 26 ML/M2 (ref 16–34)
ECHO LA VOLUME INDEX MOD A4C: 17 ML/M2 (ref 16–34)
ECHO LV E' LATERAL VELOCITY: 13 CM/S
ECHO LV EDV A2C: 89 ML
ECHO LV EDV A4C: 72 ML
ECHO LV EDV BP: 82 ML (ref 56–104)
ECHO LV EDV INDEX A4C: 35 ML/M2
ECHO LV EDV INDEX BP: 39 ML/M2
ECHO LV EDV NDEX A2C: 43 ML/M2
ECHO LV EJECTION FRACTION BIPLANE: 64 % (ref 55–100)
ECHO LV ESV A2C: 22 ML
ECHO LV ESV A4C: 26 ML
ECHO LV ESV BP: 24 ML (ref 19–49)
ECHO LV ESV INDEX A2C: 11 ML/M2
ECHO LV ESV INDEX A4C: 13 ML/M2
ECHO LV ESV INDEX BP: 12 ML/M2
ECHO LV FRACTIONAL SHORTENING: 36 % (ref 28–44)
ECHO LV INTERNAL DIMENSION DIASTOLE INDEX: 2.69 CM/M2
ECHO LV INTERNAL DIMENSION DIASTOLIC: 5.6 CM (ref 3.9–5.3)
ECHO LV INTERNAL DIMENSION SYSTOLIC INDEX: 1.73 CM/M2
ECHO LV INTERNAL DIMENSION SYSTOLIC: 3.6 CM
ECHO LV IVSD: 0.9 CM (ref 0.6–0.9)
ECHO LV IVSS: 1.3 CM
ECHO LV MASS 2D: 205.5 G (ref 67–162)
ECHO LV MASS INDEX 2D: 98.8 G/M2 (ref 43–95)
ECHO LV POSTERIOR WALL DIASTOLIC: 1 CM (ref 0.6–0.9)
ECHO LV POSTERIOR WALL SYSTOLIC: 1.2 CM
ECHO LV RELATIVE WALL THICKNESS RATIO: 0.36
ECHO LVOT AV VTI INDEX: 0.77
ECHO LVOT MEAN GRADIENT: 2 MMHG
ECHO LVOT VTI: 22.9 CM
ECHO MV A VELOCITY: 0.72 M/S
ECHO MV E DECELERATION TIME (DT): 222.9 MS
ECHO MV E VELOCITY: 0.91 M/S
ECHO MV E/A RATIO: 1.26
ECHO MV E/E' LATERAL: 7
ECHO PV MAX VELOCITY: 1 M/S
ERYTHROCYTE [SEDIMENTATION RATE] IN BLOOD BY PHOTOMETRIC METHOD: 17 MM/HR (ref 0–30)

## 2024-01-29 PROCEDURE — 93000 ELECTROCARDIOGRAM COMPLETE: CPT | Performed by: PHYSICIAN ASSISTANT

## 2024-01-29 PROCEDURE — 93306 TTE W/DOPPLER COMPLETE: CPT

## 2024-01-29 PROCEDURE — 85652 RBC SED RATE AUTOMATED: CPT

## 2024-01-29 PROCEDURE — 86140 C-REACTIVE PROTEIN: CPT

## 2024-01-29 PROCEDURE — 36415 COLL VENOUS BLD VENIPUNCTURE: CPT

## 2024-01-29 PROCEDURE — 3079F DIAST BP 80-89 MM HG: CPT | Performed by: PHYSICIAN ASSISTANT

## 2024-01-29 PROCEDURE — 99214 OFFICE O/P EST MOD 30 MIN: CPT | Performed by: PHYSICIAN ASSISTANT

## 2024-01-29 PROCEDURE — 93306 TTE W/DOPPLER COMPLETE: CPT | Performed by: INTERNAL MEDICINE

## 2024-01-29 PROCEDURE — 3077F SYST BP >= 140 MM HG: CPT | Performed by: PHYSICIAN ASSISTANT

## 2024-01-29 NOTE — TELEPHONE ENCOUNTER
----- Message from Sonja Porter PA-C sent at 1/29/2024 12:04 PM EST -----  Please notify patient that their lab results are stable. Continue ibuprofen as needed for pain.  Please continue current treatment and follow up.

## 2024-01-29 NOTE — RESULT ENCOUNTER NOTE
Please notify patient that their lab results are stable. Continue ibuprofen as needed for pain.  Please continue current treatment and follow up.

## 2024-01-29 NOTE — PROGRESS NOTES
I, Jaja Gnun am scribing for and in the presence of Sonja Porter PA-C.     Patient: Claudia Gill  : 1970  Date of Visit: 2024    REASON FOR VISIT / CONSULTATION: Hypertension (Hx: Pericarditis, HTN,HLD. Echo completed today - pericarditis flair up. Symptoms started improving yesterday - pain the chest, tightness/heaviness. Minor SOB. //Denies: Lightheaded/dizziness, Palpitations. )    History of Present Illness:        Dear Dr. Long, Dionne Mcnair, APRN - CNP,     I had the pleasure of seeing Ms. Gill today who is a 53 y.o. female who presents for follow up.     Past cardiac medical history consist of hypertension for years and has been on medication. She does have hyperlipidemia and is on simvastatin. She denies history of thyroid disease or diabetes. No history of syncopal episodes. she is a nonsmoker.  Her last known pericarditis was in 2023.    Family cardiac history consist of her brother who had heart attack at age 39, her dad had 2 heart attacks, starting in his 50's - both have had open heart and stents placed. Her sister who has pacemaker as well.     Chest CT  2023: Calcified coronary atheromatous plaque    EKG done in office 2023: Normal Sinus Rhythm, heart rate 66 bpm.    EKG done in office on 5/3/2023: Normal sinus rhythm heart rate 80 bpm.    Echo done on 2023: Global left ventricular systolic function appears preserved with an estimated ejection fraction of >65%. The left ventricular cavity size is within normal limits and the left ventricular wall thickness is mildly increased. No definite specific wall motion abnormalities were identified. No significant valvular abnormalities. Mild to moderate circumferential pericardial effusion. Mitral and tricuspid inflows show evidence of respiratory variation, this is a sensitive but not a specific sign of tamponade physiology. No evidence of right ventricular early diastolic collapse.

## 2024-05-06 ENCOUNTER — HOSPITAL ENCOUNTER (OUTPATIENT)
Age: 54
Discharge: HOME | End: 2024-05-08
Payer: COMMERCIAL

## 2024-05-06 ENCOUNTER — HOSPITAL ENCOUNTER (OUTPATIENT)
Dept: GENERAL RADIOLOGY | Age: 54
Discharge: HOME | End: 2024-05-08
Payer: COMMERCIAL

## 2024-05-06 DIAGNOSIS — M79.645 CHRONIC PAIN OF LEFT THUMB: ICD-10-CM

## 2024-05-06 DIAGNOSIS — G89.29 CHRONIC PAIN OF LEFT THUMB: ICD-10-CM

## 2024-05-06 PROCEDURE — 73130 X-RAY EXAM OF HAND: CPT

## 2024-05-12 ENCOUNTER — HOSPITAL ENCOUNTER (OUTPATIENT)
Age: 54
Discharge: HOME | End: 2024-05-12
Payer: COMMERCIAL

## 2024-05-12 DIAGNOSIS — E78.2 MIXED HYPERLIPIDEMIA: ICD-10-CM

## 2024-05-12 DIAGNOSIS — I10 BENIGN ESSENTIAL HTN: ICD-10-CM

## 2024-05-12 DIAGNOSIS — R63.5 WEIGHT GAIN: ICD-10-CM

## 2024-05-12 DIAGNOSIS — E66.01 MORBID OBESITY WITH BMI OF 45.0-49.9, ADULT (HCC): Chronic | ICD-10-CM

## 2024-05-12 DIAGNOSIS — Z11.59 ENCOUNTER FOR HEPATITIS C SCREENING TEST FOR LOW RISK PATIENT: ICD-10-CM

## 2024-05-12 LAB
ALBUMIN SERPL-MCNC: 4.4 G/DL (ref 3.5–5.2)
ALBUMIN/GLOB SERPL: 1.4 {RATIO} (ref 1–2.5)
ALP SERPL-CCNC: 110 U/L (ref 35–104)
ALT SERPL-CCNC: 27 U/L (ref 5–33)
ANION GAP SERPL CALCULATED.3IONS-SCNC: 11 MMOL/L (ref 9–17)
AST SERPL-CCNC: 22 U/L
BASOPHILS # BLD: 0.03 K/UL (ref 0–0.2)
BASOPHILS NFR BLD: 0 % (ref 0–2)
BILIRUB SERPL-MCNC: 0.7 MG/DL (ref 0.3–1.2)
BUN SERPL-MCNC: 17 MG/DL (ref 6–20)
BUN/CREAT SERPL: 24 (ref 9–20)
CALCIUM SERPL-MCNC: 9.6 MG/DL (ref 8.6–10.4)
CHLORIDE SERPL-SCNC: 101 MMOL/L (ref 98–107)
CHOLEST SERPL-MCNC: 162 MG/DL (ref 0–199)
CHOLESTEROL/HDL RATIO: 3
CO2 SERPL-SCNC: 26 MMOL/L (ref 20–31)
CREAT SERPL-MCNC: 0.7 MG/DL (ref 0.5–0.9)
EOSINOPHIL # BLD: 0.12 K/UL (ref 0–0.44)
EOSINOPHILS RELATIVE PERCENT: 2 % (ref 1–4)
ERYTHROCYTE [DISTWIDTH] IN BLOOD BY AUTOMATED COUNT: 13 % (ref 11.8–14.4)
GFR, ESTIMATED: >90 ML/MIN/1.73M2
GLUCOSE P FAST SERPL-MCNC: 105 MG/DL (ref 70–99)
HCT VFR BLD AUTO: 37 % (ref 36.3–47.1)
HCV AB SERPL QL IA: NONREACTIVE
HDLC SERPL-MCNC: 52 MG/DL
HGB BLD-MCNC: 12.2 G/DL (ref 11.9–15.1)
IMM GRANULOCYTES # BLD AUTO: 0.03 K/UL (ref 0–0.3)
IMM GRANULOCYTES NFR BLD: 0 %
LDLC SERPL CALC-MCNC: 81 MG/DL (ref 0–100)
LYMPHOCYTES NFR BLD: 3.08 K/UL (ref 1.1–3.7)
LYMPHOCYTES RELATIVE PERCENT: 40 % (ref 24–43)
MCH RBC QN AUTO: 28.4 PG (ref 25.2–33.5)
MCHC RBC AUTO-ENTMCNC: 33 G/DL (ref 28.4–34.8)
MCV RBC AUTO: 86 FL (ref 82.6–102.9)
MONOCYTES NFR BLD: 0.5 K/UL (ref 0.1–1.2)
MONOCYTES NFR BLD: 7 % (ref 3–12)
NEUTROPHILS NFR BLD: 51 % (ref 36–65)
NEUTS SEG NFR BLD: 3.87 K/UL (ref 1.5–8.1)
NRBC BLD-RTO: 0 PER 100 WBC
PLATELET # BLD AUTO: 284 K/UL (ref 138–453)
PMV BLD AUTO: 9.4 FL (ref 8.1–13.5)
POTASSIUM SERPL-SCNC: 3.8 MMOL/L (ref 3.7–5.3)
PROT SERPL-MCNC: 7.5 G/DL (ref 6.4–8.3)
RBC # BLD AUTO: 4.3 M/UL (ref 3.95–5.11)
SODIUM SERPL-SCNC: 138 MMOL/L (ref 135–144)
TRIGL SERPL-MCNC: 145 MG/DL (ref 0–149)
TSH SERPL DL<=0.05 MIU/L-ACNC: 1.54 UIU/ML (ref 0.3–5)
VLDLC SERPL CALC-MCNC: 29 MG/DL
WBC OTHER # BLD: 7.6 K/UL (ref 3.5–11.3)

## 2024-05-12 PROCEDURE — 86803 HEPATITIS C AB TEST: CPT

## 2024-05-12 PROCEDURE — 80053 COMPREHEN METABOLIC PANEL: CPT

## 2024-05-12 PROCEDURE — 80061 LIPID PANEL: CPT

## 2024-05-12 PROCEDURE — 84443 ASSAY THYROID STIM HORMONE: CPT

## 2024-05-12 PROCEDURE — 36415 COLL VENOUS BLD VENIPUNCTURE: CPT

## 2024-05-12 PROCEDURE — 85025 COMPLETE CBC W/AUTO DIFF WBC: CPT

## 2024-05-15 NOTE — ED NOTES
Crutches and splint applied to patient.      Alvin Lock RN  02/12/22 1033
FAMILY HISTORY:  No pertinent family history in first degree relatives

## 2025-06-04 ENCOUNTER — HOSPITAL ENCOUNTER (EMERGENCY)
Age: 55
Discharge: HOME | End: 2025-06-04
Attending: EMERGENCY MEDICINE
Payer: COMMERCIAL

## 2025-06-04 VITALS
SYSTOLIC BLOOD PRESSURE: 146 MMHG | HEART RATE: 76 BPM | RESPIRATION RATE: 16 BRPM | TEMPERATURE: 98.2 F | DIASTOLIC BLOOD PRESSURE: 98 MMHG | OXYGEN SATURATION: 98 %

## 2025-06-04 DIAGNOSIS — Z20.818 PERTUSSIS EXPOSURE: Primary | ICD-10-CM

## 2025-06-04 PROCEDURE — 99283 EMERGENCY DEPT VISIT LOW MDM: CPT

## 2025-06-04 RX ORDER — AZITHROMYCIN 250 MG/1
TABLET, FILM COATED ORAL
Qty: 1 PACKET | Refills: 0 | Status: SHIPPED | OUTPATIENT
Start: 2025-06-04 | End: 2025-06-08

## 2025-06-04 ASSESSMENT — ENCOUNTER SYMPTOMS
SORE THROAT: 0
ABDOMINAL DISTENTION: 0
SHORTNESS OF BREATH: 0
BACK PAIN: 0

## 2025-06-04 ASSESSMENT — PAIN - FUNCTIONAL ASSESSMENT: PAIN_FUNCTIONAL_ASSESSMENT: NONE - DENIES PAIN

## 2025-06-04 NOTE — ED PROVIDER NOTES
[facility] EMERGENCY DEPARTMENT  EMERGENCY DEPARTMENT ENCOUNTER      Pt Name: Claudia Gill  MRN: [id number]  Birthdate 1970  Date of evaluation: 6/4/2025  Provider: Lena Colunga DO    CHIEF COMPLAINT       Chief Complaint   Patient presents with    exposure     Was told by employer that she was exposed to pertussis         HISTORY OF PRESENT ILLNESS   (Location/Symptom, Timing/Onset, Context/Setting, Quality, Duration, Modifying Factors, Severity)  Note limiting factors.   Claudia Gill is a 54 y.o. female who presents to the emergency department after being exposed to pertussis more than 48 hours ago.  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  They recommended she come to the ER for prophylactic antibiotic prescription.  Patient denies any symptoms however.  She denies any cough or cold symptoms.  She denies any fever or chills.  She denies any other concerns at this time.    REVIEW OF SYSTEMS    (2-9 systems for level 4, 10 or more for level 5)     Review of Systems   Constitutional:  Negative for fatigue and fever.   HENT:  Negative for congestion and sore throat.    Eyes:  Negative for visual disturbance.   Respiratory:  Negative for shortness of breath.    Cardiovascular:  Negative for chest pain and palpitations.   Gastrointestinal:  Negative for abdominal distention.   Genitourinary:  Negative for dysuria.   Musculoskeletal:  Negative for back pain.   Skin:  Negative for rash.   Psychiatric/Behavioral:  Negative for suicidal ideas.        Except as noted above the remainder of the review of systems was reviewed and negative.       PAST MEDICAL HISTORY     Past Medical History:   Diagnosis Date    Hypertension     Morbid obesity with BMI of 45.0-49.9, adult (HCC)     MVP (mitral valve prolapse)     Pericardial effusion 5/5/2023    Pericarditis     PONV (postoperative nausea and vomiting)     after

## (undated) DEVICE — BNDG,ELSTC,MATRIX,STRL,4"X5YD,LF,HOOK&LP: Brand: MEDLINE

## (undated) DEVICE — SOLUTION IV IRRIG POUR BRL 0.9% SODIUM CHL 2F7124

## (undated) DEVICE — CONNECTOR TBNG AUX H2O JET DISP FOR OLY 160/180 SER

## (undated) DEVICE — TUBING, SUCTION, 9/32" X 12', STRAIGHT: Brand: MEDLINE INDUSTRIES, INC.

## (undated) DEVICE — SPONGE LAP W18XL18IN WHT COT 4 PLY FLD STRUNG RADPQ DISP ST

## (undated) DEVICE — TUBING SUCT 12FR MAL ALUM SHFT FN CAP VENT UNIV CONN W/ OBT

## (undated) DEVICE — SPONGE ABSORBABLE TOP 100 GEL GELFOAM LF

## (undated) DEVICE — HYPODERMIC SAFETY NEEDLE: Brand: MAGELLAN

## (undated) DEVICE — PAD,ABDOMINAL,5"X9",ST,LF,25/BX: Brand: MEDLINE INDUSTRIES, INC.

## (undated) DEVICE — ELECTRODE ES AD CRD L15FT DISP FOR PT BELOW 30LB REM

## (undated) DEVICE — GOWN,POLY REINFORCED,LG: Brand: MEDLINE

## (undated) DEVICE — SYRINGE ONLY,20ML LUER LOCK: Brand: MEDLINE INDUSTRIES, INC.

## (undated) DEVICE — ADAPTER,CATHETER/SYRINGE/LUER,STERILE: Brand: MEDLINE

## (undated) DEVICE — GLOVE SURG SZ 75 L12IN FNGR THK87MIL DK GRN LTX FREE ISOLEX

## (undated) DEVICE — SURGIFOAM SPNG SZ 100C

## (undated) DEVICE — DRAPE C ARM CLP FOR GE 9600 9800

## (undated) DEVICE — PAD,ABDOMINAL,8"X10",ST,LF: Brand: MEDLINE

## (undated) DEVICE — SYRINGE MED 50ML LUERLOCK TIP

## (undated) DEVICE — BIT DRL L140MM DIA2MM QUIK CPL 3 FLUT CALIB DEPTH MRK W/O

## (undated) DEVICE — SUTURE VCRL + SZ 3-0 L27IN ABSRB UD L26MM SH 1/2 CIR VCP416H

## (undated) DEVICE — YANKAUER,FLEXIBLE HANDLE,REGLR CAPACITY: Brand: MEDLINE INDUSTRIES, INC.

## (undated) DEVICE — BLADE SURG NO10 C STL STR DISP GLASSVAN

## (undated) DEVICE — UNDERPAD INCONT 2XL FOR 38-58IN WAIST MESH PROTCT + DISP

## (undated) DEVICE — HAND AND FT PK

## (undated) DEVICE — COVER LT HNDL BLU PLAS

## (undated) DEVICE — BANDAGE COMPR W6INXL12FT SMOOTH FOR LIMB EXSANG ESMARCH

## (undated) DEVICE — GEL K Y LUBRICATING JELLY TB 15 4OZFT

## (undated) DEVICE — SUTURE NONABSORBABLE MONOFILAMENT 3-0 PS-1 18 IN BLK ETHILON 1663H

## (undated) DEVICE — Z DISCONTINUED BY MEDLINE USE 2711682 TRAY SKIN PREP DRY W/ PREM GLV

## (undated) DEVICE — GLOVE ORANGE PI 8 1/2   MSG9085

## (undated) DEVICE — ELECTRODE ELECSURG NDL 2.8 INX7.2 CM COAT INSUL EDGE

## (undated) DEVICE — SEALER LAP SM L18.8CM OPN JAW HAND/FOOT SWCH FORCETRIAD

## (undated) DEVICE — BIT DRL L110MM DIA3.5MM QUIK CPL W/O STP REUSE

## (undated) DEVICE — SUTURE VCRL + SZ 2-0 L27IN ABSRB WHT SH 1/2 CIR TAPERCUT VCP417H

## (undated) DEVICE — GOWN,AURORA,NONREINFORCED,LARGE: Brand: MEDLINE

## (undated) DEVICE — TUBING, SUCTION, 3/16" X 10', STRAIGHT: Brand: MEDLINE

## (undated) DEVICE — JELLY,LUBE,STERILE,FLIP TOP,TUBE,2-OZ: Brand: MEDLINE

## (undated) DEVICE — PADDING,UNDERCAST,COTTON, 4"X4YD STERILE: Brand: MEDLINE

## (undated) DEVICE — GAUZE,SPONGE,4"X4",16PLY,STRL,LF,10/TRAY: Brand: MEDLINE

## (undated) DEVICE — BASIN EMSIS 700ML GRAPHITE PLAS KID SHP GRAD

## (undated) DEVICE — BIT DRL L180MM DIA2.5MM G QUIK CPL W/O STP REUSE

## (undated) DEVICE — Device: Brand: DEFENDO VALVE AND CONNECTOR KIT

## (undated) DEVICE — SYRINGE BULB 50/CS 48/PLT: Brand: MEDEGEN MEDICAL PRODUCTS, LLC

## (undated) DEVICE — GLOVE ORTHO 7   MSG9470

## (undated) DEVICE — MASTISOL ADHESIVE LIQ 2/3ML

## (undated) DEVICE — SUTURE MCRYL SZ 4-0 L27IN ABSRB UD L19MM PS-2 1/2 CIR PRIM Y426H

## (undated) DEVICE — DRAPE,LAPAROTOMY,PED,STERILE: Brand: MEDLINE

## (undated) DEVICE — BANDAGE COMPR W4INXL5YD WHT COT POLY E VELC SELF CLSR DISP

## (undated) DEVICE — GLOVE SURG SZ 75 L12IN FNGR THK87MIL WHT LTX FREE

## (undated) DEVICE — SYRINGE,EAR/ULCER, 2 OZ, STERILE: Brand: MEDLINE

## (undated) DEVICE — PADDING CAST W6INXL4YD COT LO LINTING WYTEX

## (undated) DEVICE — KIT ARMOR C DRP COLLAPSIBLE AND SELF EXP TOP CVR FOR FLUOROSCOPIC

## (undated) DEVICE — DRESSING PETRO W3XL8IN OIL EMUL N ADH GZ KNIT IMPREG CELOS

## (undated) DEVICE — 4-PORT MANIFOLD: Brand: NEPTUNE 2

## (undated) DEVICE — SOLUTION PREP POVIDONE IOD FOR SKIN MUCOUS MEM PRIOR TO

## (undated) DEVICE — NDLCTR: FOAM/MAG 40CT 64/CS: Brand: MEDICAL ACTION INDUSTRIES

## (undated) DEVICE — PENCIL ES L3M BTTN SWCH HOLSTER W/ BLDE ELECTRD EDGE

## (undated) DEVICE — THREE-QUARTER SHEET: Brand: CONVERTORS

## (undated) DEVICE — STRIP,CLOSURE,WOUND,MEDI-STRIP,1/2X4: Brand: MEDLINE

## (undated) DEVICE — SYRINGE MED 20ML STD CLR PLAS LUERLOCK TIP N CTRL DISP

## (undated) DEVICE — INTENDED FOR TISSUE SEPARATION, AND OTHER PROCEDURES THAT REQUIRE A SHARP SURGICAL BLADE TO PUNCTURE OR CUT.: Brand: BARD-PARKER ® CARBON RIB-BACK BLADES

## (undated) DEVICE — NEEDLE HYPO 22GA L1.5IN BLK S STL HUB POLYPR SHLD REG BVL

## (undated) DEVICE — SUTURE VCRL SZ 0 L36IN ABSRB UD L36MM CT-1 1/2 CIR J946H

## (undated) DEVICE — SUTURE CHROMIC GUT SZ 3-0 L27IN ABSRB BRN L26MM SH 1/2 CIR G122H